# Patient Record
Sex: FEMALE | Race: WHITE | NOT HISPANIC OR LATINO | Employment: OTHER | URBAN - METROPOLITAN AREA
[De-identification: names, ages, dates, MRNs, and addresses within clinical notes are randomized per-mention and may not be internally consistent; named-entity substitution may affect disease eponyms.]

---

## 2017-08-21 ENCOUNTER — OFFICE VISIT (OUTPATIENT)
Dept: FAMILY MEDICINE | Facility: CLINIC | Age: 67
End: 2017-08-21
Payer: MEDICARE

## 2017-08-21 ENCOUNTER — LAB VISIT (OUTPATIENT)
Dept: LAB | Facility: HOSPITAL | Age: 67
End: 2017-08-21
Attending: FAMILY MEDICINE
Payer: MEDICARE

## 2017-08-21 VITALS
WEIGHT: 132.81 LBS | HEART RATE: 84 BPM | HEIGHT: 65 IN | BODY MASS INDEX: 22.13 KG/M2 | DIASTOLIC BLOOD PRESSURE: 66 MMHG | TEMPERATURE: 98 F | SYSTOLIC BLOOD PRESSURE: 102 MMHG | OXYGEN SATURATION: 98 %

## 2017-08-21 DIAGNOSIS — Z85.41 HISTORY OF CERVICAL CANCER: ICD-10-CM

## 2017-08-21 DIAGNOSIS — Z90.12 STATUS POST LEFT MASTECTOMY: ICD-10-CM

## 2017-08-21 DIAGNOSIS — G47.00 INSOMNIA, UNSPECIFIED TYPE: ICD-10-CM

## 2017-08-21 DIAGNOSIS — F32.A DEPRESSION, UNSPECIFIED DEPRESSION TYPE: ICD-10-CM

## 2017-08-21 DIAGNOSIS — Z79.899 LONG-TERM USE OF HIGH-RISK MEDICATION: Primary | ICD-10-CM

## 2017-08-21 DIAGNOSIS — Z79.899 LONG-TERM USE OF HIGH-RISK MEDICATION: ICD-10-CM

## 2017-08-21 DIAGNOSIS — N39.0 URINARY TRACT INFECTION WITHOUT HEMATURIA, SITE UNSPECIFIED: ICD-10-CM

## 2017-08-21 LAB
ALBUMIN SERPL BCP-MCNC: 3.7 G/DL
ALP SERPL-CCNC: 111 U/L
ALT SERPL W/O P-5'-P-CCNC: 14 U/L
ANION GAP SERPL CALC-SCNC: 9 MMOL/L
AST SERPL-CCNC: 18 U/L
BILIRUB SERPL-MCNC: 0.8 MG/DL
BUN SERPL-MCNC: 6 MG/DL
CALCIUM SERPL-MCNC: 9.6 MG/DL
CHLORIDE SERPL-SCNC: 107 MMOL/L
CO2 SERPL-SCNC: 26 MMOL/L
CREAT SERPL-MCNC: 0.8 MG/DL
EST. GFR  (AFRICAN AMERICAN): >60 ML/MIN/1.73 M^2
EST. GFR  (NON AFRICAN AMERICAN): >60 ML/MIN/1.73 M^2
GLUCOSE SERPL-MCNC: 97 MG/DL
POTASSIUM SERPL-SCNC: 4.1 MMOL/L
PROT SERPL-MCNC: 7.3 G/DL
SODIUM SERPL-SCNC: 142 MMOL/L

## 2017-08-21 PROCEDURE — 80053 COMPREHEN METABOLIC PANEL: CPT

## 2017-08-21 PROCEDURE — 36415 COLL VENOUS BLD VENIPUNCTURE: CPT | Mod: PO

## 2017-08-21 PROCEDURE — 1126F AMNT PAIN NOTED NONE PRSNT: CPT | Mod: S$GLB,,, | Performed by: FAMILY MEDICINE

## 2017-08-21 PROCEDURE — 99999 PR PBB SHADOW E&M-NEW PATIENT-LVL III: CPT | Mod: PBBFAC,,, | Performed by: FAMILY MEDICINE

## 2017-08-21 PROCEDURE — 99203 OFFICE O/P NEW LOW 30 MIN: CPT | Mod: S$GLB,,, | Performed by: FAMILY MEDICINE

## 2017-08-21 PROCEDURE — 1159F MED LIST DOCD IN RCRD: CPT | Mod: S$GLB,,, | Performed by: FAMILY MEDICINE

## 2017-08-21 PROCEDURE — 3008F BODY MASS INDEX DOCD: CPT | Mod: S$GLB,,, | Performed by: FAMILY MEDICINE

## 2017-08-21 RX ORDER — NITROFURANTOIN 25; 75 MG/1; MG/1
CAPSULE ORAL
COMMUNITY
Start: 2017-08-11 | End: 2017-11-21

## 2017-08-21 RX ORDER — DOXEPIN HYDROCHLORIDE 100 MG/1
100 CAPSULE ORAL NIGHTLY
Qty: 30 CAPSULE | Refills: 1 | Status: SHIPPED | OUTPATIENT
Start: 2017-08-21 | End: 2017-10-30 | Stop reason: DRUGHIGH

## 2017-08-21 RX ORDER — DULOXETIN HYDROCHLORIDE 30 MG/1
CAPSULE, DELAYED RELEASE ORAL
COMMUNITY
Start: 2017-08-11 | End: 2017-08-21 | Stop reason: SDUPTHER

## 2017-08-21 RX ORDER — DOXEPIN HYDROCHLORIDE 100 MG/1
CAPSULE ORAL
COMMUNITY
Start: 2017-07-10 | End: 2017-08-21 | Stop reason: SDUPTHER

## 2017-08-21 RX ORDER — DULOXETIN HYDROCHLORIDE 60 MG/1
60 CAPSULE, DELAYED RELEASE ORAL
COMMUNITY
Start: 2014-01-20 | End: 2017-08-21 | Stop reason: SDUPTHER

## 2017-08-21 RX ORDER — DULOXETIN HYDROCHLORIDE 30 MG/1
30 CAPSULE, DELAYED RELEASE ORAL DAILY
Qty: 30 CAPSULE | Refills: 1 | Status: SHIPPED | OUTPATIENT
Start: 2017-08-21 | End: 2017-10-30 | Stop reason: SDUPTHER

## 2017-08-21 RX ORDER — DULOXETIN HYDROCHLORIDE 60 MG/1
60 CAPSULE, DELAYED RELEASE ORAL DAILY
Qty: 30 CAPSULE | Refills: 1 | Status: SHIPPED | OUTPATIENT
Start: 2017-08-21 | End: 2017-10-30 | Stop reason: SDUPTHER

## 2017-08-21 NOTE — PROGRESS NOTES
"Subjective:       Patient ID: Jaimee Mccarthy is a 66 y.o. female.    Chief Complaint: Establish Care      HPI Comments:     She presents to establish care.  Recently moved back to the area from [place].    History of cervical cancer many years ago: "They removed part of my cervix and used a cold cone".  Also status post left mastectomy, but patient says was negative for cancer.  She's been followed by an oncologist for many years and has a mammogram ordered.    Patient requesting refill on her mental health medications that would carry her until October when she has an appointment with our psychiatrist.  She has a history of depression and anxiety.  She's been on the same medications for years which she says is working well: Cymbalta 90 mg (60 mg +30 mg) every morning, and doxepin 100 mg daily at bedtime.      She was recently treated for UTI at an emergency room and is still taking her Macrobid.  Symptoms are improving.  However she had a metabolic panel done and was told that it was "abnormal" with no details    She has a family history of cancer: Father with oral cancer; brother with liver cancer; sister with kidney/bladder cancer.        Review of Systems   Constitutional: Negative for activity change, appetite change, fatigue and fever.   HENT: Negative for sore throat.    Respiratory: Negative for cough and shortness of breath.    Cardiovascular: Negative for chest pain and leg swelling.   Gastrointestinal: Negative for abdominal pain, diarrhea and nausea.   Genitourinary: Negative for dysuria and hematuria.   Musculoskeletal: Negative for back pain.   Neurological: Negative for dizziness and headaches.   Hematological: Negative for adenopathy.   Psychiatric/Behavioral: Negative for confusion.       Objective:      Physical Exam   Constitutional: She is oriented to person, place, and time. She appears well-developed and well-nourished. No distress.   HENT:   Head: Normocephalic.   Mouth/Throat: No " oropharyngeal exudate.   Eyes: Conjunctivae are normal. Right eye exhibits no discharge. Left eye exhibits no discharge. No scleral icterus.   Neck: Normal range of motion. Neck supple. No JVD present. No thyromegaly present.   Cardiovascular: Normal rate, regular rhythm, normal heart sounds and intact distal pulses.  Exam reveals no gallop and no friction rub.    No murmur heard.  Pulmonary/Chest: Effort normal and breath sounds normal. No respiratory distress. She has no wheezes. She has no rales.   Musculoskeletal: Normal range of motion. She exhibits no edema.   Lymphadenopathy:     She has no cervical adenopathy.   Neurological: She is alert and oriented to person, place, and time.   Skin: Skin is warm and dry. No rash noted. She is not diaphoretic.   Psychiatric: She has a normal mood and affect. Her behavior is normal. Judgment and thought content normal.   Nursing note and vitals reviewed.      Assessment:       1. Long-term use of high-risk medication    2. Depression, unspecified depression type    3. Insomnia, unspecified type    4. Urinary tract infection without hematuria, site unspecified    5. Status post left mastectomy    6. History of cervical cancer        Plan:   Long-term use of high-risk medication  Comments:  We'll repeat her metabolic panel today.  Follow-up for physical in November  Orders:  -     Comprehensive metabolic panel; Future; Expected date: 08/21/2017    Depression, unspecified depression type  Comments:  Long-standing.  Well-controlled with Cymbalta, but taking 90 mg a day.  Patient wants to refill this now and follow up with psychiatrist next month    Insomnia, unspecified type  Comments:  Has done well with doxepin at bedtime for years.  Refill ×2 months given until psychiatrist resumes prescribing    Urinary tract infection without hematuria, site unspecified  Comments:  Symptoms improving on Macrobid    Status post left mastectomy  Comments:  Although patient says cancer  evaluation was all negative    History of cervical cancer    Other orders  -     doxepin (SINEQUAN) 100 MG capsule; Take 1 capsule (100 mg total) by mouth every evening.  Dispense: 30 capsule; Refill: 1  -     duloxetine (CYMBALTA) 30 MG capsule; Take 1 capsule (30 mg total) by mouth once daily.  Dispense: 30 capsule; Refill: 1  -     duloxetine (CYMBALTA) 60 MG capsule; Take 1 capsule (60 mg total) by mouth once daily.  Dispense: 30 capsule; Refill: 1

## 2017-10-26 NOTE — PROGRESS NOTES
"Outpatient Psychiatry Initial Visit (MD/NP)    10/30/2017    Jaimee Mccarthy, a 66 y.o. female, presenting for initial evaluation visit. Met with patient.    Reason for Encounter: self-referral. Patient complains of depression, anxiety.     History of Present Illness: This 66 y/o F presents to [facility], previously a patient of Dr. Degroot(sp?) who she last saw about 3-4 months ago, but who she says disclosed protected health information to a family member so she is seeking new psychiatrist. Reports current medication regimen includes Cymbalta 90 mg (60 mg +30 mg) every morning, & doxepin 100 mg daily at bedtime. Says this regimen is working well. On 8.9.17 - had ER visit by [facility] after EMS called. EMS noted paranoia - cited that she'd ripped shingles off roof, told police she thought someone had been in house several days before after calling them due to things moved in her home, heard something on her roof and when she checked found vents weren't sealed, "strange wiring", called police. ED physician exam documented that she didn't seem to be hallucinating, delusional or thought-disordered and she was discharged.     No SI/HI  No AVH,       PsychHx: as above; "Depression with anxiety" - office-based outpatient treatment, inpatient treatment - x2-3 (for changing sleep medication in context of highly distressing insomnia);   Series of previous antidepressants - reports having when started Cymbalta 90 mg daily, helped moods far more than prior treatments ("when I started it I thought I was reborn").   Doxepin 100 mg qhs. Partially helpful for Insomnia;   Two prior psychiatrist, previously Dr. Degroot(sp?) Treating her with same medications. Another at [facility], threatened to turn him in for medicaid fraud  8.9.17 - ER visit by [facility] after EMS called. EMS noted paranoia - cited that she'd ripped shingles off roof, told police she thought someone had been in house several days before after calling " "them due to things moved in her home, heard something on her roof and when she checked found vents weren't sealed, "strange wiring", called police. No found to be psychotic on ED assessment. No other PEC's though had visit from a state agency in '16 due to concern by an unknown neighbor for "delusional", "acting out at her apartment" (report didn't describe her specific acting out behavior).  had  come out for a wellness check - "someone in the complex said I wasn't taking care of myself", said my "apartment was a wreck".  found her apartment in order, didn't take her in for care. Denies conflicts with others or reasons someone might make a bad shara report. Has been out of that complex x 5-6 months.   One remote episode of passive suicidality in  when went in for lumpectomy - ended up with mastectomy, + lymph node dissection then learned pathology showed no cancer.     FamHx: sister with serious mental illness, son chinmay    MedHx:   Rib Removed  Breast CA - mastectomy - didn't have CA.   Cervical CA (s/p cone) - History of cervical cancer many years ago:   Basal and squamous cell CA (nares)  Hyperlipidemia   Recurrent urinary tract infections    SocialHx: born in [facility], raised in . 3 sibs growing up; much older (18 and 20 years).   lives in subsidized disability housing; sister institutionalized. Lived with patient's parents as an adult. On/off meds. Cycle of hospitalizations. Functioned ok on meds, not off. Doesn't know diagnoses. Normal socially & academically.   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    x 1 child. Was single mother. Lives in [place].  x 2 (,  36 years). Supportive people "all " - both parents and siblings . Trying to get moved in to house. Trying to volunteer with the food bank. Wants to join [employer].   Social security - recognized mental health disability.  "   Takes medication every day.       Review Of Systems:     GENERAL:  No weight gain or loss  SKIN:  No rashes or lacerations  HEAD:  No headaches  EYES:  No exophthalmos, jaundice or blindness  EARS:  No dizziness, tinnitus or hearing loss  NOSE:  No changes in smell  MOUTH & THROAT:  No dyskinetic movements or obvious goiter  CHEST:  No shortness of breath, hyperventilation or cough  CARDIOVASCULAR:  No tachycardia or chest pain  ABDOMEN:  No nausea, vomiting, pain, constipation or diarrhea  URINARY:  No frequency, dysuria or sexual dysfunction  ENDOCRINE:  No polydipsia, polyuria  MUSCULOSKELETAL:  No pain or stiffness of the joints  NEUROLOGIC:  No weakness, sensory changes, seizures, confusion, memory loss, tremor or other abnormal movements      Current Evaluation:     Nutritional Screening: Considering the patient's height and weight, medications, medical history and preferences, should a referral be made to the dietitian? no    Constitutional  Vitals:  Most recent vital signs, dated less than 90 days prior to this appointment, were not reviewed.  There were no vitals filed for this visit.     General:  unremarkable, age appropriate     Musculoskeletal  Muscle Strength/Tone:  no tremor, no tic   Gait & Station:  non-ataxic     Psychiatric  Appearance: casually dressed & groomed;   Behavior: calm,   Cooperation: cooperative with assessment  Speech: normal rate, volume, tone  Thought Process: linear, goal-directed  Thought Content: No suicidal or homicidal ideation; no delusions  Affect: mildly anxious  Mood: mildly anxious  Perceptions: No auditory or visual hallucinations  Level of Consciousness: alert throughout interview  Insight: fair  Cognition: Oriented to person, place, time, & situation  Memory: no apparent deficits to general clinical interview; not formally assessed  Attention/Concentration: no apparent deficits to general clinical interview; not formally assessed  Fund of Knowledge: average by  vocabulary/education    Laboratory Data  No visits with results within 1 Month(s) from this visit.   Latest known visit with results is:   Lab Visit on 08/21/2017   Component Date Value Ref Range Status    Sodium 08/21/2017 142  136 - 145 mmol/L Final    Potassium 08/21/2017 4.1  3.5 - 5.1 mmol/L Final    Chloride 08/21/2017 107  95 - 110 mmol/L Final    CO2 08/21/2017 26  23 - 29 mmol/L Final    Glucose 08/21/2017 97  70 - 110 mg/dL Final    BUN, Bld 08/21/2017 6* 8 - 23 mg/dL Final    Creatinine 08/21/2017 0.8  0.5 - 1.4 mg/dL Final    Calcium 08/21/2017 9.6  8.7 - 10.5 mg/dL Final    Total Protein 08/21/2017 7.3  6.0 - 8.4 g/dL Final    Albumin 08/21/2017 3.7  3.5 - 5.2 g/dL Final    Total Bilirubin 08/21/2017 0.8  0.1 - 1.0 mg/dL Final    Alkaline Phosphatase 08/21/2017 111  55 - 135 U/L Final    AST 08/21/2017 18  10 - 40 U/L Final    ALT 08/21/2017 14  10 - 44 U/L Final    Anion Gap 08/21/2017 9  8 - 16 mmol/L Final    eGFR if African American 08/21/2017 >60.0  >60 mL/min/1.73 m^2 Final    eGFR if non African American 08/21/2017 >60.0  >60 mL/min/1.73 m^2 Final     Medications  Outpatient Encounter Prescriptions as of 10/30/2017   Medication Sig Dispense Refill    doxepin (SINEQUAN) 100 MG capsule Take 1 capsule (100 mg total) by mouth every evening. 30 capsule 1    duloxetine (CYMBALTA) 30 MG capsule Take 1 capsule (30 mg total) by mouth once daily. 30 capsule 1    duloxetine (CYMBALTA) 60 MG capsule Take 1 capsule (60 mg total) by mouth once daily. 30 capsule 1    nitrofurantoin, macrocrystal-monohydrate, (MACROBID) 100 MG capsule        No facility-administered encounter medications on file as of 10/30/2017.      Assessment - Diagnosis - Goals:     Impression: 68 y/o F with hx of problems with anxiety, depression for years, ongoing treatment with benefit and well-tolerated regimen. Has had a P[facility] for concerns for paranoia and near-PEC for alleged neglect but neither well substantiated.      Treatment Goals:  Specify outcomes written in observable, behavioral terms:   Control depression and anxiety symptoms by self-report    Treatment Plan/Recommendations:   · Continue duloxetine 90 mg daily, doxepin 50 to 100 mg qhs prn sleep. Consider alternatives should TCA side effects become evident.  · Discussed risks, benefits, and alternatives to treatment plan documented above with patient. I answered all patient questions related to this plan and patient expressed understanding and agreement.     Return to Clinic: 2 months    Counseling time: 10 minutes  Total time: 50 minutes    VIPIN Chavez MD

## 2017-10-30 ENCOUNTER — OFFICE VISIT (OUTPATIENT)
Dept: PSYCHIATRY | Facility: CLINIC | Age: 67
End: 2017-10-30
Payer: MEDICARE

## 2017-10-30 DIAGNOSIS — F41.1 GAD (GENERALIZED ANXIETY DISORDER): Primary | ICD-10-CM

## 2017-10-30 DIAGNOSIS — G47.00 INSOMNIA, UNSPECIFIED TYPE: ICD-10-CM

## 2017-10-30 PROCEDURE — 90792 PSYCH DIAG EVAL W/MED SRVCS: CPT | Mod: S$GLB,,, | Performed by: PSYCHIATRY & NEUROLOGY

## 2017-10-30 RX ORDER — DOXEPIN HYDROCHLORIDE 50 MG/1
CAPSULE ORAL
Qty: 60 CAPSULE | Refills: 2 | Status: SHIPPED | OUTPATIENT
Start: 2017-10-30 | End: 2018-01-30 | Stop reason: ALTCHOICE

## 2017-10-30 RX ORDER — DULOXETIN HYDROCHLORIDE 60 MG/1
60 CAPSULE, DELAYED RELEASE ORAL DAILY
Qty: 30 CAPSULE | Refills: 2 | Status: SHIPPED | OUTPATIENT
Start: 2017-10-30 | End: 2018-01-30 | Stop reason: SDUPTHER

## 2017-10-30 RX ORDER — DULOXETIN HYDROCHLORIDE 30 MG/1
30 CAPSULE, DELAYED RELEASE ORAL DAILY
Qty: 30 CAPSULE | Refills: 2 | Status: SHIPPED | OUTPATIENT
Start: 2017-10-30 | End: 2018-01-30 | Stop reason: SDUPTHER

## 2017-11-21 ENCOUNTER — OFFICE VISIT (OUTPATIENT)
Dept: FAMILY MEDICINE | Facility: CLINIC | Age: 67
End: 2017-11-21
Payer: MEDICARE

## 2017-11-21 VITALS
TEMPERATURE: 98 F | SYSTOLIC BLOOD PRESSURE: 110 MMHG | DIASTOLIC BLOOD PRESSURE: 70 MMHG | BODY MASS INDEX: 22.41 KG/M2 | HEART RATE: 97 BPM | OXYGEN SATURATION: 97 % | WEIGHT: 134.5 LBS | HEIGHT: 65 IN

## 2017-11-21 DIAGNOSIS — Z23 NEED FOR VACCINATION: ICD-10-CM

## 2017-11-21 DIAGNOSIS — J06.9 UPPER RESPIRATORY TRACT INFECTION, UNSPECIFIED TYPE: ICD-10-CM

## 2017-11-21 DIAGNOSIS — E87.6 HYPOKALEMIA: ICD-10-CM

## 2017-11-21 DIAGNOSIS — Z78.0 POSTMENOPAUSAL: ICD-10-CM

## 2017-11-21 DIAGNOSIS — Z12.39 SCREENING FOR BREAST CANCER: ICD-10-CM

## 2017-11-21 DIAGNOSIS — R30.0 DYSURIA: ICD-10-CM

## 2017-11-21 DIAGNOSIS — Z00.00 ANNUAL PHYSICAL EXAM: Primary | ICD-10-CM

## 2017-11-21 DIAGNOSIS — Z13.820 SCREENING FOR OSTEOPOROSIS: ICD-10-CM

## 2017-11-21 DIAGNOSIS — F32.A DEPRESSION, UNSPECIFIED DEPRESSION TYPE: ICD-10-CM

## 2017-11-21 LAB
BILIRUB SERPL-MCNC: NEGATIVE MG/DL
BLOOD URINE, POC: NORMAL
COLOR, POC UA: YELLOW
GLUCOSE UR QL STRIP: NORMAL
KETONES UR QL STRIP: NEGATIVE
LEUKOCYTE ESTERASE URINE, POC: NORMAL
NITRITE, POC UA: NEGATIVE
PH, POC UA: 5
PROTEIN, POC: NEGATIVE
SPECIFIC GRAVITY, POC UA: 1.02
UROBILINOGEN, POC UA: NORMAL

## 2017-11-21 PROCEDURE — 90670 PCV13 VACCINE IM: CPT | Mod: S$GLB,,, | Performed by: FAMILY MEDICINE

## 2017-11-21 PROCEDURE — G0008 ADMIN INFLUENZA VIRUS VAC: HCPCS | Mod: S$GLB,,, | Performed by: FAMILY MEDICINE

## 2017-11-21 PROCEDURE — 99397 PER PM REEVAL EST PAT 65+ YR: CPT | Mod: 25,S$GLB,, | Performed by: FAMILY MEDICINE

## 2017-11-21 PROCEDURE — 87086 URINE CULTURE/COLONY COUNT: CPT

## 2017-11-21 PROCEDURE — 99999 PR PBB SHADOW E&M-EST. PATIENT-LVL V: CPT | Mod: PBBFAC,,, | Performed by: FAMILY MEDICINE

## 2017-11-21 PROCEDURE — 81002 URINALYSIS NONAUTO W/O SCOPE: CPT | Mod: S$GLB,,, | Performed by: FAMILY MEDICINE

## 2017-11-21 PROCEDURE — G0009 ADMIN PNEUMOCOCCAL VACCINE: HCPCS | Mod: S$GLB,,, | Performed by: FAMILY MEDICINE

## 2017-11-21 PROCEDURE — 90662 IIV NO PRSV INCREASED AG IM: CPT | Mod: S$GLB,,, | Performed by: FAMILY MEDICINE

## 2017-11-21 RX ORDER — NITROFURANTOIN 25; 75 MG/1; MG/1
100 CAPSULE ORAL 2 TIMES DAILY
Qty: 14 CAPSULE | Refills: 0 | Status: ON HOLD | OUTPATIENT
Start: 2017-11-21 | End: 2018-10-01 | Stop reason: HOSPADM

## 2017-11-21 RX ORDER — NITROFURANTOIN 25; 75 MG/1; MG/1
100 CAPSULE ORAL EVERY 12 HOURS
Status: DISCONTINUED | OUTPATIENT
Start: 2017-11-21 | End: 2017-11-21

## 2017-11-21 RX ORDER — METHYLPREDNISOLONE 4 MG/1
TABLET ORAL
Qty: 1 PACKAGE | Refills: 0 | Status: SHIPPED | OUTPATIENT
Start: 2017-11-21 | End: 2017-12-12

## 2017-11-21 NOTE — PROGRESS NOTES
Subjective:       Patient ID: Jaimee Mccarthy is a 67 y.o. female.    Chief Complaint: Back Pain; Nocturia; and Headache      HPI Comments:       Current Outpatient Prescriptions:     doxepin (SINEQUAN) 50 MG capsule, Take 1 to 2 tablets at bedtime as needed for sleep, Disp: 60 capsule, Rfl: 2    DULoxetine (CYMBALTA) 30 MG capsule, Take 1 capsule (30 mg total) by mouth once daily., Disp: 30 capsule, Rfl: 2    DULoxetine (CYMBALTA) 60 MG capsule, Take 1 capsule (60 mg total) by mouth once daily., Disp: 30 capsule, Rfl: 2    methylPREDNISolone (MEDROL DOSEPACK) 4 mg tablet, use as directed, Disp: 1 Package, Rfl: 0    nitrofurantoin, macrocrystal-monohydrate, (MACROBID) 100 MG capsule, Take 1 capsule (100 mg total) by mouth 2 (two) times daily., Disp: 14 capsule, Rfl: 0  No current facility-administered medications for this visit.       She presents with several concerns: 3 week history of sore throat and bilateral ear pain, cough of clear sputum and clear sinus drip.  Taking only Tylenol.  No history of seasonal ALLERGIES.  Nonsmoker.    Also complains of dysuria and urinary frequency with a cloudiness in her urine for several days; chronic left lower back pain but no flank pain.  Says that she's been talking to psychiatrist about stopping her doxepin because it seems to set her up for urinary tract infection. Attempt to switch to trazadone was not well tolerated: hung over.    Also says that she's had long-term problem with hypokalemia and is currently having leg cramps.  She doesn't know why she gets hypokalemic.  She does not take fluid pills, and her diet is normal.    Her mammogram, I'll previously been normal.  Never had a DEXA scan or colonoscopy.  Wants flu shot today vaccines.      Review of Systems   Constitutional: Negative for activity change, appetite change and fever.   HENT: Positive for congestion, ear pain, postnasal drip, rhinorrhea and sinus pressure. Negative for sore throat.    Respiratory:  "Positive for cough. Negative for shortness of breath and wheezing.    Cardiovascular: Negative for chest pain.   Gastrointestinal: Negative for abdominal pain, diarrhea and nausea.   Genitourinary: Positive for dysuria, frequency and urgency. Negative for difficulty urinating, flank pain and hematuria.   Musculoskeletal: Negative for arthralgias and myalgias.   Allergic/Immunologic: Negative for environmental allergies.   Neurological: Negative for dizziness and headaches.   Psychiatric/Behavioral: Negative for dysphoric mood. The patient is not nervous/anxious.        Objective:      Vitals:    11/21/17 0957   BP: 110/70   Pulse: 97   Temp: 97.6 °F (36.4 °C)   TempSrc: Tympanic   SpO2: 97%   Weight: 61 kg (134 lb 7.7 oz)   Height: 5' 4.5" (1.638 m)   PainSc: 0-No pain     Physical Exam   Constitutional: She is oriented to person, place, and time. She appears well-developed and well-nourished.  Non-toxic appearance. She does not appear ill. No distress.   HENT:   Head: Normocephalic.   Right Ear: Tympanic membrane, external ear and ear canal normal.   Left Ear: Tympanic membrane, external ear and ear canal normal.   Nose: Mucosal edema and rhinorrhea present.   Mouth/Throat: Mucous membranes are normal. Posterior oropharyngeal edema present. No oropharyngeal exudate or posterior oropharyngeal erythema.   Neck: Neck supple. No thyromegaly present.   Cardiovascular: Normal rate, regular rhythm and normal heart sounds.    No murmur heard.  Pulmonary/Chest: Effort normal and breath sounds normal. She has no wheezes. She has no rales.   Abdominal: Soft. She exhibits no distension. There is no hepatosplenomegaly. There is tenderness in the suprapubic area. There is no CVA tenderness.   Musculoskeletal: She exhibits no edema.   Lymphadenopathy:     She has no cervical adenopathy.   Neurological: She is alert and oriented to person, place, and time.   Skin: Skin is warm and dry. She is not diaphoretic.   Psychiatric: She " has a normal mood and affect. Her behavior is normal. Judgment and thought content normal.   Nursing note and vitals reviewed.      Assessment:       1. Annual physical exam    2. Hypokalemia    3. Need for vaccination    4. Dysuria    5. Screening for breast cancer    6. Screening for osteoporosis    7. Postmenopausal    8. Upper respiratory tract infection, unspecified type    9. Depression, unspecified depression type        Plan:   Annual physical exam  -     Lipid panel; Future; Expected date: 11/21/2017  -     Hepatitis C antibody; Future; Expected date: 12/05/2017  -     Ambulatory consult to Gastroenterology  -     Mammo Digital Screening Bilat with CAD; Future; Expected date: 11/21/2017  -     DXA Bone Density Spine And Hip; Future; Expected date: 11/21/2017    Hypokalemia  -     Comprehensive metabolic panel; Future; Expected date: 11/21/2017    Need for vaccination  -     Pneumococcal Conjugate Vaccine (13 Valent) (IM)    Dysuria  -     POCT urine dipstick without microscope  -     Urine culture; Future; Expected date: 11/21/2017    Screening for breast cancer  -     Mammo Digital Screening Bilat with CAD; Future; Expected date: 11/21/2017    Screening for osteoporosis  -     DXA Bone Density Spine And Hip; Future; Expected date: 11/21/2017    Postmenopausal  -     DXA Bone Density Spine And Hip; Future; Expected date: 11/21/2017    Upper respiratory tract infection, unspecified type    Depression, unspecified depression type  Comments:  followed by mental health. Needs to get off of Doxepin. Didn't like trazadone    Other orders  -     Cancel: Tdap Vaccine  -     Influenza - High Dose (65+) (PF) (IM)  -     Discontinue: nitrofurantoin (macrocrystal-monohydrate) 100 MG capsule 100 mg; Take 1 capsule (100 mg total) by mouth every 12 (twelve) hours.  -     methylPREDNISolone (MEDROL DOSEPACK) 4 mg tablet; use as directed  Dispense: 1 Package; Refill: 0  -     nitrofurantoin, macrocrystal-monohydrate,  (MACROBID) 100 MG capsule; Take 1 capsule (100 mg total) by mouth 2 (two) times daily.  Dispense: 14 capsule; Refill: 0

## 2017-11-22 ENCOUNTER — TELEPHONE (OUTPATIENT)
Dept: FAMILY MEDICINE | Facility: CLINIC | Age: 67
End: 2017-11-22

## 2017-11-22 LAB
BACTERIA UR CULT: NORMAL
BACTERIA UR CULT: NORMAL

## 2017-11-22 NOTE — TELEPHONE ENCOUNTER
----- Message from Julia Beltrán sent at 11/22/2017  8:54 AM CST -----  Contact: pt  States she's returning a call. Please call pt at [phone]. Thank you

## 2017-11-27 ENCOUNTER — LAB VISIT (OUTPATIENT)
Dept: LAB | Facility: HOSPITAL | Age: 67
End: 2017-11-27
Attending: FAMILY MEDICINE
Payer: MEDICARE

## 2017-11-27 DIAGNOSIS — E87.6 HYPOKALEMIA: ICD-10-CM

## 2017-11-27 DIAGNOSIS — Z00.00 ANNUAL PHYSICAL EXAM: ICD-10-CM

## 2017-11-27 LAB
ALBUMIN SERPL BCP-MCNC: 3.9 G/DL
ALP SERPL-CCNC: 114 U/L
ALT SERPL W/O P-5'-P-CCNC: 13 U/L
ANION GAP SERPL CALC-SCNC: 8 MMOL/L
AST SERPL-CCNC: 17 U/L
BILIRUB SERPL-MCNC: 1.1 MG/DL
BUN SERPL-MCNC: 11 MG/DL
CALCIUM SERPL-MCNC: 9.9 MG/DL
CHLORIDE SERPL-SCNC: 107 MMOL/L
CHOLEST SERPL-MCNC: 230 MG/DL
CHOLEST/HDLC SERPL: 5.6 {RATIO}
CO2 SERPL-SCNC: 27 MMOL/L
CREAT SERPL-MCNC: 0.8 MG/DL
EST. GFR  (AFRICAN AMERICAN): >60 ML/MIN/1.73 M^2
EST. GFR  (NON AFRICAN AMERICAN): >60 ML/MIN/1.73 M^2
GLUCOSE SERPL-MCNC: 105 MG/DL
HDLC SERPL-MCNC: 41 MG/DL
HDLC SERPL: 17.8 %
LDLC SERPL CALC-MCNC: 157 MG/DL
NONHDLC SERPL-MCNC: 189 MG/DL
POTASSIUM SERPL-SCNC: 4.5 MMOL/L
PROT SERPL-MCNC: 7.4 G/DL
SODIUM SERPL-SCNC: 142 MMOL/L
TRIGL SERPL-MCNC: 160 MG/DL

## 2017-11-27 PROCEDURE — 80061 LIPID PANEL: CPT

## 2017-11-27 PROCEDURE — 80053 COMPREHEN METABOLIC PANEL: CPT

## 2017-11-27 PROCEDURE — 36415 COLL VENOUS BLD VENIPUNCTURE: CPT | Mod: PO

## 2017-11-27 PROCEDURE — 86803 HEPATITIS C AB TEST: CPT

## 2017-11-28 LAB — HCV AB SERPL QL IA: NEGATIVE

## 2017-11-30 ENCOUNTER — TELEPHONE (OUTPATIENT)
Dept: FAMILY MEDICINE | Facility: CLINIC | Age: 67
End: 2017-11-30

## 2017-11-30 NOTE — TELEPHONE ENCOUNTER
----- Message from Ayanna Mccarty sent at 11/30/2017  8:15 AM CST -----  Contact: [phone]   Returning call, please call back at [phone].  Thx-AH

## 2017-12-05 ENCOUNTER — TELEPHONE (OUTPATIENT)
Dept: FAMILY MEDICINE | Facility: CLINIC | Age: 67
End: 2017-12-05

## 2017-12-05 NOTE — TELEPHONE ENCOUNTER
Spoke with patient and informed her that I had not been trying to get in contact with her. Patient states that she has had several missed calls, explained that it might be Dr. Gibbs and I will have him call her if that is the case. Patient verbalized understanding.

## 2017-12-05 NOTE — TELEPHONE ENCOUNTER
----- Message from Ean Roberto sent at 12/5/2017  7:49 AM CST -----  Patient returned your call.  Call her at [phone].                                 [email or web address]

## 2018-01-12 ENCOUNTER — HOSPITAL ENCOUNTER (OUTPATIENT)
Dept: RADIOLOGY | Facility: HOSPITAL | Age: 68
Discharge: HOME | End: 2018-01-12
Attending: FAMILY MEDICINE
Payer: MEDICARE

## 2018-01-12 DIAGNOSIS — Z00.00 ANNUAL PHYSICAL EXAM: ICD-10-CM

## 2018-01-12 DIAGNOSIS — Z12.39 SCREENING FOR BREAST CANCER: ICD-10-CM

## 2018-01-12 PROCEDURE — 77067 SCR MAMMO BI INCL CAD: CPT | Mod: 26,,, | Performed by: RADIOLOGY

## 2018-01-12 PROCEDURE — 77063 BREAST TOMOSYNTHESIS BI: CPT | Mod: 26,,, | Performed by: RADIOLOGY

## 2018-01-12 PROCEDURE — 77067 SCR MAMMO BI INCL CAD: CPT | Mod: TC

## 2018-01-30 ENCOUNTER — OFFICE VISIT (OUTPATIENT)
Dept: PSYCHIATRY | Facility: CLINIC | Age: 68
End: 2018-01-30
Payer: MEDICARE

## 2018-01-30 DIAGNOSIS — F22 DELUSIONAL DISORDER: Primary | ICD-10-CM

## 2018-01-30 PROCEDURE — 99213 OFFICE O/P EST LOW 20 MIN: CPT | Mod: S$GLB,,, | Performed by: PSYCHIATRY & NEUROLOGY

## 2018-01-30 RX ORDER — QUETIAPINE FUMARATE 100 MG/1
TABLET, FILM COATED ORAL
Qty: 45 TABLET | Refills: 2 | Status: SHIPPED | OUTPATIENT
Start: 2018-01-30 | End: 2018-02-12 | Stop reason: SINTOL

## 2018-01-30 RX ORDER — DULOXETIN HYDROCHLORIDE 60 MG/1
60 CAPSULE, DELAYED RELEASE ORAL DAILY
Qty: 30 CAPSULE | Refills: 2 | Status: SHIPPED | OUTPATIENT
Start: 2018-01-30 | End: 2018-03-07 | Stop reason: SDUPTHER

## 2018-01-30 RX ORDER — DULOXETIN HYDROCHLORIDE 30 MG/1
30 CAPSULE, DELAYED RELEASE ORAL DAILY
Qty: 30 CAPSULE | Refills: 1 | Status: SHIPPED | OUTPATIENT
Start: 2018-01-30 | End: 2018-03-07 | Stop reason: SDUPTHER
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
## 2018-02-12 ENCOUNTER — TELEPHONE (OUTPATIENT)
Dept: PSYCHIATRY | Facility: CLINIC | Age: 68
End: 2018-02-12

## 2018-02-12 RX ORDER — OLANZAPINE 5 MG/1
5 TABLET ORAL NIGHTLY
Qty: 30 TABLET | Refills: 1 | Status: SHIPPED | OUTPATIENT
Start: 2018-02-12 | End: 2018-03-07 | Stop reason: SINTOL

## 2018-02-12 NOTE — TELEPHONE ENCOUNTER
Pt is having a bad reaction to the seroquel - it is causing nausea, seems to bring on panic attack like symptoms, and is not helping her sleep at all.  Pt is wondering if you can prescribe an alternate.

## 2018-03-02 RX ORDER — DOXEPIN HYDROCHLORIDE 50 MG/1
CAPSULE ORAL
Qty: 60 CAPSULE | Refills: 0 | OUTPATIENT
Start: 2018-03-02

## 2018-03-02 RX ORDER — DULOXETIN HYDROCHLORIDE 30 MG/1
CAPSULE, DELAYED RELEASE ORAL
Qty: 30 CAPSULE | Refills: 0 | Status: SHIPPED | OUTPATIENT
Start: 2018-03-02 | End: 2018-03-07 | Stop reason: SDUPTHER

## 2018-03-07 ENCOUNTER — OFFICE VISIT (OUTPATIENT)
Dept: PSYCHIATRY | Facility: CLINIC | Age: 68
End: 2018-03-07
Payer: MEDICARE

## 2018-03-07 DIAGNOSIS — G47.00 INSOMNIA, UNSPECIFIED TYPE: Primary | ICD-10-CM

## 2018-03-07 PROCEDURE — 99213 OFFICE O/P EST LOW 20 MIN: CPT | Mod: S$GLB,,, | Performed by: PSYCHIATRY & NEUROLOGY

## 2018-03-07 RX ORDER — TRAZODONE HYDROCHLORIDE 150 MG/1
TABLET ORAL
Qty: 30 TABLET | Refills: 3 | Status: SHIPPED | OUTPATIENT
Start: 2018-03-07 | End: 2018-07-11 | Stop reason: SDUPTHER

## 2018-03-07 RX ORDER — DULOXETIN HYDROCHLORIDE 60 MG/1
60 CAPSULE, DELAYED RELEASE ORAL DAILY
Qty: 30 CAPSULE | Refills: 2 | Status: SHIPPED | OUTPATIENT
Start: 2018-03-07 | End: 2018-07-18 | Stop reason: SDUPTHER

## 2018-03-07 RX ORDER — DULOXETIN HYDROCHLORIDE 30 MG/1
30 CAPSULE, DELAYED RELEASE ORAL DAILY
Qty: 30 CAPSULE | Refills: 2 | Status: SHIPPED | OUTPATIENT
Start: 2018-03-07 | End: 2018-07-18 | Stop reason: SDUPTHER
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
## 2018-07-12 RX ORDER — TRAZODONE HYDROCHLORIDE 150 MG/1
TABLET ORAL
Qty: 30 TABLET | Refills: 0 | Status: SHIPPED | OUTPATIENT
Start: 2018-07-12 | End: 2018-08-01 | Stop reason: SDUPTHER

## 2018-07-18 RX ORDER — DULOXETIN HYDROCHLORIDE 60 MG/1
60 CAPSULE, DELAYED RELEASE ORAL DAILY
Qty: 30 CAPSULE | Refills: 0 | Status: SHIPPED | OUTPATIENT
Start: 2018-07-18 | End: 2018-08-01 | Stop reason: SDUPTHER

## 2018-07-18 RX ORDER — DULOXETIN HYDROCHLORIDE 30 MG/1
30 CAPSULE, DELAYED RELEASE ORAL DAILY
Qty: 30 CAPSULE | Refills: 0 | Status: SHIPPED | OUTPATIENT
Start: 2018-07-18 | End: 2018-08-01 | Stop reason: SDUPTHER

## 2018-07-31 NOTE — PROGRESS NOTES
"Outpatient Psychiatry Follow-up Visit (MD/NP)    8/1/2018    Jaimee Mccarthy, a 67 y.o. female, presenting for follow-up visit. Met with patient.    Reason for Encounter: self-referral. Patient complains of depression, anxiety.     Interval Hx: Patient seen and interviewed for follow-up. Reports generally feeling and functioning well. Has been active, participating in gardening, enjoying it. In good spirits. No new health problems. Some financial stress. Eating healthy. Adherent to duloxetine daily, trazodone most days. Denies side effects. Lost phone since last visit.    background: This 66 y/o F presents to [facility], previously a patient of Dr. Degroot(sp?) who she last saw about 3-4 months ago, but who she says disclosed protected health information to a family member so she is seeking new psychiatrist. Reports current medication regimen includes Cymbalta 90 mg (60 mg +30 mg) every morning, & doxepin 100 mg daily at bedtime. Says this regimen is working well. On 8.9.17 - had ER visit by [facility] after EMS called. EMS noted paranoia - cited that she'd ripped shingles off roof, told police she thought someone had been in house several days before after calling them due to things moved in her home, heard something on her roof & when she checked found vents weren't sealed, "strange wiring", called police. ED physician exam documented that she didn't seem to be hallucinating, delusional or thought-disordered and she was discharged. No SI/HI. No AVH. PsychHx: as above; "Depression with anxiety" - office-based outpt treatment, inpatient treatment - x2-3 (for changing sleep medication in context of highly distressing insomnia); Series of previous antidepressants - reports having when started Cymbalta 90 mg daily, helped moods far more than prior treatments ("when I started it I thought I was reborn"). Doxepin 100 mg qhs. Partially helpful for Insomnia; Two prior psychiatrist, previously Dr. Degroot(sp?) Treating her " "with same medications. Another at [facility], threatened to turn him in for medicaid fraud. 8.9.17 - ER visit by [facility] after EMS called. EMS noted paranoia - cited that she'd ripped shingles off roof, told police she thought someone had been in house several days before after calling them due to things moved in her home, heard something on her roof & when she checked found vents weren't sealed, "strange wiring", called police. No found to be psychotic on ED assessment. No other PEC's though had visit from a state agency in '16 due to concern by an unknown neighbor for "delusional", "acting out at her apartment" (report didn't describe her specific acting out behavior).  had  come out for a wellness check - "someone in the complex said I wasn't taking care of myself", said my "apartment was a wreck".  found her apartment in order, didn't take her in for care. Denies conflicts with others or reasons someone might make a bad shara report. Has been out of that complex x 5-6 months. One remote episode of passive suicidality in '13 when went in for lumpectomy - ended up with mastectomy, + lymph node dissection then learned pathology showed no cancer. FamHx: sister with serious mental illness, son dyslexia. MedHx: Rib Removed. Breast CA dx for which she had mastectomy - reports pathology later indicated she didn't have CA. Cervical CA (s/p cone) - History of cervical cancer many years ago: Basal and squamous cell CA (nares). Hyperlipidemia. Recurrent urinary tract infections. SocialHx: born in [facility], raised in . 3 sibs growing up; much older (18 and 20 years). lives in subsidized disability housing; sister institutionalized. Lived with patient's parents as an adult. On/off meds. Cycle of hospitalizations. Functioned ok on meds, not off. Doesn't know diagnoses. Normal socially & academically. 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 Moved to " "[place] to be with son.  x 1 child. Was single mother. Lives in [place].  x 2 (,  36 years). Supportive people "all " - both parents and siblings . Trying to get moved in to house. Trying to volunteer with the food bank. Wants to join [employer].   Social security - recognized mental health disability.    Takes medication every day.     Review Of Systems:     GENERAL:  No weight gain or loss  SKIN:  No rashes or lacerations  HEAD:  No headaches  CHEST:  No shortness of breath, hyperventilation or cough  CARDIOVASCULAR:  No tachycardia or chest pain  ABDOMEN:  No nausea, vomiting, pain, constipation or diarrhea  URINARY:  No frequency, dysuria or sexual dysfunction  ENDOCRINE:  No polydipsia, polyuria  MUSCULOSKELETAL:  No pain or stiffness of the joints  NEUROLOGIC:  No weakness, sensory changes, seizures, confusion, memory loss, tremor or other abnormal movements    Current Evaluation:     Nutritional Screening: Considering the patient's height and weight, medications, medical history and preferences, should a referral be made to the dietitian? no    Constitutional  Vitals:  Most recent vital signs, dated less than 90 days prior to this appointment, were not reviewed.  There were no vitals filed for this visit.     General:  unremarkable, age appropriate     Musculoskeletal  Muscle Strength/Tone:  no tremor, no tic   Gait & Station:  non-ataxic     Psychiatric  Appearance: casually dressed & groomed;   Behavior: calm,   Cooperation: cooperative with assessment  Speech: normal rate, volume, tone  Thought Process: linear, goal-directed  Thought Content: No suicidal or homicidal ideation; no delusions  Affect: mildly anxious  Mood: mildly anxious  Perceptions: No auditory or visual hallucinations  Level of Consciousness: alert throughout interview  Insight: fair  Cognition: Oriented to person, place, time, & situation  Memory: no apparent deficits to general clinical interview; not " formally assessed  Attention/Concentration: no apparent deficits to general clinical interview; not formally assessed  Fund of Knowledge: average by vocabulary/education    Laboratory Data  No visits with results within 1 Month(s) from this visit.   Latest known visit with results is:   Lab Visit on 11/27/2017   Component Date Value Ref Range Status    Cholesterol 11/27/2017 230* 120 - 199 mg/dL Final    Triglycerides 11/27/2017 160* 30 - 150 mg/dL Final    HDL 11/27/2017 41  40 - 75 mg/dL Final    LDL Cholesterol 11/27/2017 157.0  63.0 - 159.0 mg/dL Final    HDL/Chol Ratio 11/27/2017 17.8* 20.0 - 50.0 % Final    Total Cholesterol/HDL Ratio 11/27/2017 5.6* 2.0 - 5.0 Final    Non-HDL Cholesterol 11/27/2017 189  mg/dL Final    Hepatitis C Ab 11/27/2017 Negative   Final    Sodium 11/27/2017 142  136 - 145 mmol/L Final    Potassium 11/27/2017 4.5  3.5 - 5.1 mmol/L Final    Chloride 11/27/2017 107  95 - 110 mmol/L Final    CO2 11/27/2017 27  23 - 29 mmol/L Final    Glucose 11/27/2017 105  70 - 110 mg/dL Final    BUN, Bld 11/27/2017 11  8 - 23 mg/dL Final    Creatinine 11/27/2017 0.8  0.5 - 1.4 mg/dL Final    Calcium 11/27/2017 9.9  8.7 - 10.5 mg/dL Final    Total Protein 11/27/2017 7.4  6.0 - 8.4 g/dL Final    Albumin 11/27/2017 3.9  3.5 - 5.2 g/dL Final    Total Bilirubin 11/27/2017 1.1* 0.1 - 1.0 mg/dL Final    Alkaline Phosphatase 11/27/2017 114  55 - 135 U/L Final    AST 11/27/2017 17  10 - 40 U/L Final    ALT 11/27/2017 13  10 - 44 U/L Final    Anion Gap 11/27/2017 8  8 - 16 mmol/L Final    eGFR if African American 11/27/2017 >60.0  >60 mL/min/1.73 m^2 Final    eGFR if non African American 11/27/2017 >60.0  >60 mL/min/1.73 m^2 Final     Medications  Outpatient Encounter Prescriptions as of 8/1/2018   Medication Sig Dispense Refill    DULoxetine (CYMBALTA) 30 MG capsule Take 1 capsule (30 mg total) by mouth once daily. 30 capsule 0    DULoxetine (CYMBALTA) 60 MG capsule Take 1 capsule (60  mg total) by mouth once daily. 30 capsule 0    nitrofurantoin, macrocrystal-monohydrate, (MACROBID) 100 MG capsule Take 1 capsule (100 mg total) by mouth 2 (two) times daily. 14 capsule 0    traZODone (DESYREL) 150 MG tablet TAKE 1/2-1 TABLET AT BEDTIME AS NEEDED FOR SLEEP 30 tablet 0     No facility-administered encounter medications on file as of 8/1/2018.      Assessment - Diagnosis - Goals:     Impression: 68 y/o F with hx of chronic, seeming intermittent paranoia, possibly hallucinations as well. Poor insight into nature of symptoms. Self-identifies her mental health problems as with anxiety, depression for years. Has had a P[facility] for concerns for paranoia and near-PEC for alleged neglect. Hasn't tolerated and self-discontinued 2 antipsychotics prescribed since last visit, reports didn't tolerate several others previously. No paranoia evident at this visit.     Dx: insomnia, anxiety, consider chronic psychotic disorders (no clear psychosis since last visit)    Treatment Goals:  Specify outcomes written in observable, behavioral terms:   Reduce paranoia. Control depression and anxiety symptoms by self-report    Treatment Plan/Recommendations:   · Trazodone for sleep. She takes duloxetine for pain and it seems to be helpful for mood. Observe for paranoia, other symptoms, consider antipsychotic treatment as indicated.   · Discussed risks, benefits, and alternatives to treatment plan documented above with patient. I answered all patient questions related to this plan and patient expressed understanding and agreement.   · She asked again for me to write a letter on her behalf with regard to medical advisability of gun ownership. I told her I couldn't do this in good conscience as she's been treated multiple times in past for paranoia. She has a copy of FBI information presented to my office last year.     Return to Clinic: 2 months    Counseling time: 5 minutes  Total time: 20 minutes    VIPIN Chavez MD

## 2018-08-01 ENCOUNTER — OFFICE VISIT (OUTPATIENT)
Dept: PSYCHIATRY | Facility: CLINIC | Age: 68
End: 2018-08-01
Payer: MEDICARE

## 2018-08-01 DIAGNOSIS — G47.00 INSOMNIA, UNSPECIFIED TYPE: Primary | ICD-10-CM

## 2018-08-01 DIAGNOSIS — F41.9 ANXIETY: ICD-10-CM

## 2018-08-01 PROCEDURE — 99999 PR PBB SHADOW E&M-EST. PATIENT-LVL I: CPT | Mod: PBBFAC,,, | Performed by: PSYCHIATRY & NEUROLOGY

## 2018-08-01 PROCEDURE — 99213 OFFICE O/P EST LOW 20 MIN: CPT | Mod: S$GLB,,, | Performed by: PSYCHIATRY & NEUROLOGY

## 2018-08-01 RX ORDER — DULOXETIN HYDROCHLORIDE 60 MG/1
60 CAPSULE, DELAYED RELEASE ORAL DAILY
Qty: 30 CAPSULE | Refills: 3 | Status: ON HOLD | OUTPATIENT
Start: 2018-08-01 | End: 2018-10-01 | Stop reason: HOSPADM

## 2018-08-01 RX ORDER — TRAZODONE HYDROCHLORIDE 150 MG/1
TABLET ORAL
Qty: 30 TABLET | Refills: 3 | Status: ON HOLD | OUTPATIENT
Start: 2018-08-01 | End: 2018-10-01 | Stop reason: HOSPADM

## 2018-08-01 RX ORDER — DULOXETIN HYDROCHLORIDE 30 MG/1
30 CAPSULE, DELAYED RELEASE ORAL DAILY
Qty: 30 CAPSULE | Refills: 3 | Status: ON HOLD | OUTPATIENT
Start: 2018-08-01 | End: 2018-10-01 | Stop reason: HOSPADM

## 2018-08-07 PROBLEM — F41.9 ANXIETY: Status: ACTIVE | Noted: 2018-08-07

## 2018-09-25 ENCOUNTER — HOSPITAL ENCOUNTER (EMERGENCY)
Facility: HOSPITAL | Age: 68
Discharge: PSYCHIATRIC OR SUBSTANCE USE FACILITY | End: 2018-09-26
Attending: EMERGENCY MEDICINE
Payer: MEDICARE

## 2018-09-25 DIAGNOSIS — F32.3 CURRENT SEVERE EPISODE OF MAJOR DEPRESSIVE DISORDER WITH PSYCHOTIC FEATURES WITHOUT PRIOR EPISODE: ICD-10-CM

## 2018-09-25 DIAGNOSIS — F22 PARANOID PSYCHOSIS: ICD-10-CM

## 2018-09-25 DIAGNOSIS — R31.9 URINARY TRACT INFECTION WITH HEMATURIA, SITE UNSPECIFIED: Primary | ICD-10-CM

## 2018-09-25 DIAGNOSIS — Z00.8 MEDICAL CLEARANCE FOR PSYCHIATRIC ADMISSION: ICD-10-CM

## 2018-09-25 DIAGNOSIS — N39.0 URINARY TRACT INFECTION WITH HEMATURIA, SITE UNSPECIFIED: Primary | ICD-10-CM

## 2018-09-25 LAB
ALBUMIN SERPL BCP-MCNC: 4.3 G/DL
ALP SERPL-CCNC: 81 U/L
ALT SERPL W/O P-5'-P-CCNC: 12 U/L
AMPHET+METHAMPHET UR QL: NEGATIVE
ANION GAP SERPL CALC-SCNC: 14 MMOL/L
APAP SERPL-MCNC: 3 UG/ML
AST SERPL-CCNC: 16 U/L
BACTERIA #/AREA URNS HPF: ABNORMAL /HPF
BARBITURATES UR QL SCN>200 NG/ML: NEGATIVE
BASOPHILS # BLD AUTO: 0.02 K/UL
BASOPHILS # BLD AUTO: 0.02 K/UL
BASOPHILS NFR BLD: 0.1 %
BASOPHILS NFR BLD: 0.1 %
BENZODIAZ UR QL SCN>200 NG/ML: NEGATIVE
BILIRUB SERPL-MCNC: 1.1 MG/DL
BILIRUB UR QL STRIP: NEGATIVE
BUN SERPL-MCNC: 7 MG/DL
BZE UR QL SCN: NEGATIVE
CALCIUM SERPL-MCNC: 10.6 MG/DL
CANNABINOIDS UR QL SCN: NEGATIVE
CHLORIDE SERPL-SCNC: 107 MMOL/L
CLARITY UR: ABNORMAL
CO2 SERPL-SCNC: 22 MMOL/L
COLOR UR: YELLOW
CREAT SERPL-MCNC: 0.8 MG/DL
CREAT UR-MCNC: 167.2 MG/DL
DIFFERENTIAL METHOD: ABNORMAL
DIFFERENTIAL METHOD: ABNORMAL
EOSINOPHIL # BLD AUTO: 0.1 K/UL
EOSINOPHIL # BLD AUTO: 0.1 K/UL
EOSINOPHIL NFR BLD: 0.3 %
EOSINOPHIL NFR BLD: 0.3 %
ERYTHROCYTE [DISTWIDTH] IN BLOOD BY AUTOMATED COUNT: 13.3 %
ERYTHROCYTE [DISTWIDTH] IN BLOOD BY AUTOMATED COUNT: 13.3 %
EST. GFR  (AFRICAN AMERICAN): >60 ML/MIN/1.73 M^2
EST. GFR  (NON AFRICAN AMERICAN): >60 ML/MIN/1.73 M^2
ETHANOL SERPL-MCNC: <10 MG/DL
GLUCOSE SERPL-MCNC: 129 MG/DL
GLUCOSE UR QL STRIP: NEGATIVE
HCT VFR BLD AUTO: 39.7 %
HCT VFR BLD AUTO: 39.7 %
HGB BLD-MCNC: 14.5 G/DL
HGB BLD-MCNC: 14.5 G/DL
HGB UR QL STRIP: ABNORMAL
HYALINE CASTS #/AREA URNS LPF: 0 /LPF
KETONES UR QL STRIP: ABNORMAL
LEUKOCYTE ESTERASE UR QL STRIP: ABNORMAL
LYMPHOCYTES # BLD AUTO: 2.7 K/UL
LYMPHOCYTES # BLD AUTO: 2.7 K/UL
LYMPHOCYTES NFR BLD: 17.9 %
LYMPHOCYTES NFR BLD: 17.9 %
MCH RBC QN AUTO: 32.3 PG
MCH RBC QN AUTO: 32.3 PG
MCHC RBC AUTO-ENTMCNC: 36.5 G/DL
MCHC RBC AUTO-ENTMCNC: 36.5 G/DL
MCV RBC AUTO: 88 FL
MCV RBC AUTO: 88 FL
METHADONE UR QL SCN>300 NG/ML: NEGATIVE
MICROSCOPIC COMMENT: ABNORMAL
MONOCYTES # BLD AUTO: 1.1 K/UL
MONOCYTES # BLD AUTO: 1.1 K/UL
MONOCYTES NFR BLD: 7.6 %
MONOCYTES NFR BLD: 7.6 %
NEUTROPHILS # BLD AUTO: 11 K/UL
NEUTROPHILS # BLD AUTO: 11 K/UL
NEUTROPHILS NFR BLD: 74.1 %
NEUTROPHILS NFR BLD: 74.1 %
NITRITE UR QL STRIP: NEGATIVE
OPIATES UR QL SCN: NEGATIVE
PCP UR QL SCN>25 NG/ML: NEGATIVE
PH UR STRIP: 8 [PH] (ref 5–8)
PLATELET # BLD AUTO: 289 K/UL
PLATELET # BLD AUTO: 289 K/UL
PMV BLD AUTO: 9.5 FL
PMV BLD AUTO: 9.5 FL
POTASSIUM SERPL-SCNC: 3.2 MMOL/L
PROT SERPL-MCNC: 8 G/DL
PROT UR QL STRIP: ABNORMAL
RBC # BLD AUTO: 4.49 M/UL
RBC # BLD AUTO: 4.49 M/UL
RBC #/AREA URNS HPF: 42 /HPF (ref 0–4)
SALICYLATES SERPL-MCNC: <5 MG/DL
SODIUM SERPL-SCNC: 143 MMOL/L
SP GR UR STRIP: 1.02 (ref 1–1.03)
SQUAMOUS #/AREA URNS HPF: 0 /HPF
TOXICOLOGY INFORMATION: NORMAL
TSH SERPL DL<=0.005 MIU/L-ACNC: 1.01 UIU/ML
URN SPEC COLLECT METH UR: ABNORMAL
UROBILINOGEN UR STRIP-ACNC: 1 EU/DL
WBC # BLD AUTO: 14.91 K/UL
WBC # BLD AUTO: 14.91 K/UL
WBC #/AREA URNS HPF: >100 /HPF (ref 0–5)

## 2018-09-25 PROCEDURE — 81000 URINALYSIS NONAUTO W/SCOPE: CPT | Mod: 59

## 2018-09-25 PROCEDURE — 87086 URINE CULTURE/COLONY COUNT: CPT

## 2018-09-25 PROCEDURE — 99285 EMERGENCY DEPT VISIT HI MDM: CPT | Mod: 25

## 2018-09-25 PROCEDURE — 84443 ASSAY THYROID STIM HORMONE: CPT

## 2018-09-25 PROCEDURE — 87077 CULTURE AEROBIC IDENTIFY: CPT

## 2018-09-25 PROCEDURE — 93005 ELECTROCARDIOGRAM TRACING: CPT

## 2018-09-25 PROCEDURE — 80307 DRUG TEST PRSMV CHEM ANLYZR: CPT

## 2018-09-25 PROCEDURE — G0425 INPT/ED TELECONSULT30: HCPCS | Mod: GT,,, | Performed by: PSYCHIATRY & NEUROLOGY

## 2018-09-25 PROCEDURE — 25000003 PHARM REV CODE 250: Performed by: EMERGENCY MEDICINE

## 2018-09-25 PROCEDURE — 80329 ANALGESICS NON-OPIOID 1 OR 2: CPT

## 2018-09-25 PROCEDURE — 87088 URINE BACTERIA CULTURE: CPT

## 2018-09-25 PROCEDURE — 93010 ELECTROCARDIOGRAM REPORT: CPT | Mod: ,,, | Performed by: INTERNAL MEDICINE

## 2018-09-25 PROCEDURE — 85025 COMPLETE CBC W/AUTO DIFF WBC: CPT

## 2018-09-25 PROCEDURE — 80320 DRUG SCREEN QUANTALCOHOLS: CPT

## 2018-09-25 PROCEDURE — 87186 SC STD MICRODIL/AGAR DIL: CPT

## 2018-09-25 PROCEDURE — 80053 COMPREHEN METABOLIC PANEL: CPT

## 2018-09-25 RX ORDER — CEFUROXIME AXETIL 250 MG/1
500 TABLET ORAL EVERY 12 HOURS
Status: DISCONTINUED | OUTPATIENT
Start: 2018-09-25 | End: 2018-09-26 | Stop reason: HOSPADM

## 2018-09-25 RX ORDER — QUETIAPINE FUMARATE 100 MG/1
100 TABLET, FILM COATED ORAL NIGHTLY
Status: DISCONTINUED | OUTPATIENT
Start: 2018-09-25 | End: 2018-09-26 | Stop reason: HOSPADM

## 2018-09-25 RX ADMIN — QUETIAPINE FUMARATE 100 MG: 100 TABLET ORAL at 11:09

## 2018-09-25 RX ADMIN — CEFUROXIME AXETIL 500 MG: 250 TABLET ORAL at 11:09

## 2018-09-26 VITALS
TEMPERATURE: 99 F | HEART RATE: 88 BPM | OXYGEN SATURATION: 95 % | DIASTOLIC BLOOD PRESSURE: 80 MMHG | SYSTOLIC BLOOD PRESSURE: 135 MMHG | RESPIRATION RATE: 18 BRPM | BODY MASS INDEX: 23.08 KG/M2 | HEIGHT: 64 IN

## 2018-09-26 PROBLEM — F29 PSYCHOSIS: Status: ACTIVE | Noted: 2018-09-26

## 2018-09-26 PROBLEM — N30.01 ACUTE CYSTITIS WITH HEMATURIA: Status: ACTIVE | Noted: 2018-09-26

## 2018-09-26 PROBLEM — D50.9 IRON DEFICIENCY ANEMIA: Status: ACTIVE | Noted: 2018-09-26

## 2018-09-26 PROBLEM — E87.6 HYPOKALEMIA: Status: ACTIVE | Noted: 2018-09-26

## 2018-09-26 NOTE — ED NOTES
Went into patients room. Patient has family/friend present. Patient states she was having hallucinations, delusions, and paranoia.

## 2018-09-26 NOTE — ED PROVIDER NOTES
SCRIBE #1 NOTE: I, Maxine Ornelas, am scribing for, and in the presence of, Vera Perez MD. I have scribed the entire note.      History      Chief Complaint   Patient presents with    Hallucinations     AH       Review of patient's allergies indicates:   Allergen Reactions    Codeine Swelling    Sulfa (sulfonamide antibiotics) Anaphylaxis    Asa-acetaminophen-caff-buffers         HPI   HPI    9/25/2018, 8:03 PM   History obtained from the patient and friend      History of Present Illness: Jaimee Mccarthy is a 67 y.o. female patient who presents to the Emergency Department for auditory hallucinations which onset gradually 1 year ago. Symptoms are constant and moderate in severity. Pt reports she has informed her psychiatrist about sxs. Per friend, pt was at her door and states someone was following her and someone was in her friend's attic. Per friend, pt has been paranoid lately. Pt went to the Fire Station who called EMS because pt thought her friend was being held [facility]. Pt reports she has been under a lot of stress lately. No mitigating or exacerbating factors reported. Associated sxs include HA and sleep disturbance. Patient denies any CP, SOB, fever, chills, n/v/d, SI, HI, self injury, and all other sxs at this time. Pt states she is compliant with her Cymbalta and trazodone. No further complaints or concerns at this time.     Arrival mode: EMS     PCP: Mihir Gibbs MD       Past Medical History:  Past Medical History:   Diagnosis Date    Cervical cancer        Past Surgical History:  Past Surgical History:   Procedure Laterality Date    MASTECTOMY           Family History:  Family History   Problem Relation Age of Onset    Cancer Father     Cancer Sister     Cancer Brother        Social History:  Social History     Tobacco Use    Smoking status: Former Smoker    Smokeless tobacco: Never Used   Substance and Sexual Activity    Alcohol use: Not given    Drug use: Not given    Sexual  activity: Not given       ROS   Review of Systems   Constitutional: Negative for appetite change, chills, fever and unexpected weight change.        (+) Increased stress   HENT: Negative for sore throat.    Respiratory: Negative for shortness of breath.    Cardiovascular: Negative for chest pain.   Gastrointestinal: Negative for abdominal pain, diarrhea, nausea and vomiting.   Genitourinary: Negative for dysuria.   Musculoskeletal: Negative for back pain and neck pain.   Skin: Negative for rash.   Neurological: Positive for headaches. Negative for dizziness, syncope, weakness and numbness.   Hematological: Does not bruise/bleed easily.   Psychiatric/Behavioral: Positive for hallucinations (auditory). Negative for agitation, self-injury and suicidal ideas. The patient is nervous/anxious.         (-) HI   All other systems reviewed and are negative.    Physical Exam      Initial Vitals   BP Pulse Resp Temp SpO2   09/25/18 1911 09/25/18 1911 09/25/18 1911 09/25/18 1911 09/25/18 2109   (!) 159/89 102 18 98.8 °F (37.1 °C) 99 %      MAP       --                 Physical Exam  Nursing Notes and Vital Signs Reviewed.  Constitutional: Patient is in no acute distress. Well-developed and well-nourished.  Head: Atraumatic. Normocephalic.  Eyes: PERRL. EOM intact. Conjunctivae are not pale. No scleral icterus.  ENT: Mucous membranes are moist. Oropharynx is clear and symmetric.    Neck: Supple. Full ROM. No lymphadenopathy.  Cardiovascular: Regular rate. Regular rhythm. No murmurs, rubs, or gallops. Distal pulses are 2+ and symmetric.  Pulmonary/Chest: No respiratory distress. Clear to auscultation bilaterally. No wheezing or rales.  Abdominal: Soft and non-distended.  There is no tenderness.  No rebound, guarding, or rigidity. Good bowel sounds.  Genitourinary: No CVA tenderness  Musculoskeletal: Moves all extremities. No obvious deformities. No edema. No calf tenderness.  Skin: Warm and dry.  Neurological:  Alert, awake, and  "appropriate.  Normal speech.  No acute focal neurological deficits are appreciated.  Psychiatric:               Behavior: normal, cooperative              Mood and Affect: normal affect              Thought Process: within normal limits              Suicidal Ideations: No              Suicidal Plan: No specific plan to harm self              Homicidal Ideations: No              Hallucinations: auditory    ED Course    Procedures  ED Vital Signs:  Vitals:    09/25/18 1911 09/25/18 2109 09/25/18 2147 09/25/18 2217   BP: (!) 159/89 126/67 130/66 (!) 130/59   Pulse: 102 84 88 84   Resp: 18 18 20 16   Temp: 98.8 °F (37.1 °C)      TempSrc: Oral      SpO2:  99% 96% 97%   Height: 5' 4" (1.626 m)       09/25/18 2232 09/25/18 2247 09/25/18 2302 09/26/18 0154   BP: 129/66 (!) 118/59 128/72 135/80   Pulse: 92 89 86 88   Resp: 18 18 20 18   Temp:       TempSrc:       SpO2: 96% 95% 98% 95%   Height:           Abnormal Lab Results:  Labs Reviewed   CBC W/ AUTO DIFFERENTIAL - Abnormal; Notable for the following components:       Result Value    WBC 14.91 (*)     MCH 32.3 (*)     MCHC 36.5 (*)     Gran # (ANC) 11.0 (*)     Mono # 1.1 (*)     Gran% 74.1 (*)     Lymph% 17.9 (*)     All other components within normal limits   CBC W/ AUTO DIFFERENTIAL - Abnormal; Notable for the following components:    WBC 14.91 (*)     MCH 32.3 (*)     MCHC 36.5 (*)     Gran # (ANC) 11.0 (*)     Mono # 1.1 (*)     Gran% 74.1 (*)     Lymph% 17.9 (*)     All other components within normal limits   COMPREHENSIVE METABOLIC PANEL - Abnormal; Notable for the following components:    Potassium 3.2 (*)     CO2 22 (*)     Glucose 129 (*)     BUN, Bld 7 (*)     Calcium 10.6 (*)     Total Bilirubin 1.1 (*)     All other components within normal limits   URINALYSIS, REFLEX TO URINE CULTURE - Abnormal; Notable for the following components:    Appearance, UA Cloudy (*)     Protein, UA 2+ (*)     Ketones, UA 1+ (*)     Occult Blood UA 2+ (*)     Leukocytes, UA 3+ " (*)     All other components within normal limits    Narrative:     Preferred Collection Type->Urine, Clean Catch   ACETAMINOPHEN LEVEL - Abnormal; Notable for the following components:    Acetaminophen (Tylenol), Serum 3.0 (*)     All other components within normal limits   SALICYLATE LEVEL - Abnormal; Notable for the following components:    Salicylate Lvl <5.0 (*)     All other components within normal limits   URINALYSIS MICROSCOPIC - Abnormal; Notable for the following components:    RBC, UA 42 (*)     WBC, UA >100 (*)     Bacteria, UA Many (*)     All other components within normal limits    Narrative:     Preferred Collection Type->Urine, Clean Catch   CULTURE, URINE   TSH   DRUG SCREEN PANEL, URINE EMERGENCY    Narrative:     Preferred Collection Type->Urine, Clean Catch   ALCOHOL,MEDICAL (ETHANOL)   URINALYSIS        All Lab Results:  Results for orders placed or performed during the hospital encounter of 09/25/18   CBC auto differential   Result Value Ref Range    WBC 14.91 (H) 3.90 - 12.70 K/uL    RBC 4.49 4.00 - 5.40 M/uL    Hemoglobin 14.5 12.0 - 16.0 g/dL    Hematocrit 39.7 37.0 - 48.5 %    MCV 88 82 - 98 fL    MCH 32.3 (H) 27.0 - 31.0 pg    MCHC 36.5 (H) 32.0 - 36.0 g/dL    RDW 13.3 11.5 - 14.5 %    Platelets 289 150 - 350 K/uL    MPV 9.5 9.2 - 12.9 fL    Gran # (ANC) 11.0 (H) 1.8 - 7.7 K/uL    Lymph # 2.7 1.0 - 4.8 K/uL    Mono # 1.1 (H) 0.3 - 1.0 K/uL    Eos # 0.1 0.0 - 0.5 K/uL    Baso # 0.02 0.00 - 0.20 K/uL    Gran% 74.1 (H) 38.0 - 73.0 %    Lymph% 17.9 (L) 18.0 - 48.0 %    Mono% 7.6 4.0 - 15.0 %    Eosinophil% 0.3 0.0 - 8.0 %    Basophil% 0.1 0.0 - 1.9 %    Differential Method Automated    CBC auto differential   Result Value Ref Range    WBC 14.91 (H) 3.90 - 12.70 K/uL    RBC 4.49 4.00 - 5.40 M/uL    Hemoglobin 14.5 12.0 - 16.0 g/dL    Hematocrit 39.7 37.0 - 48.5 %    MCV 88 82 - 98 fL    MCH 32.3 (H) 27.0 - 31.0 pg    MCHC 36.5 (H) 32.0 - 36.0 g/dL    RDW 13.3 11.5 - 14.5 %    Platelets 289  150 - 350 K/uL    MPV 9.5 9.2 - 12.9 fL    Gran # (ANC) 11.0 (H) 1.8 - 7.7 K/uL    Lymph # 2.7 1.0 - 4.8 K/uL    Mono # 1.1 (H) 0.3 - 1.0 K/uL    Eos # 0.1 0.0 - 0.5 K/uL    Baso # 0.02 0.00 - 0.20 K/uL    Gran% 74.1 (H) 38.0 - 73.0 %    Lymph% 17.9 (L) 18.0 - 48.0 %    Mono% 7.6 4.0 - 15.0 %    Eosinophil% 0.3 0.0 - 8.0 %    Basophil% 0.1 0.0 - 1.9 %    Differential Method Automated    Comprehensive metabolic panel   Result Value Ref Range    Sodium 143 136 - 145 mmol/L    Potassium 3.2 (L) 3.5 - 5.1 mmol/L    Chloride 107 95 - 110 mmol/L    CO2 22 (L) 23 - 29 mmol/L    Glucose 129 (H) 70 - 110 mg/dL    BUN, Bld 7 (L) 8 - 23 mg/dL    Creatinine 0.8 0.5 - 1.4 mg/dL    Calcium 10.6 (H) 8.7 - 10.5 mg/dL    Total Protein 8.0 6.0 - 8.4 g/dL    Albumin 4.3 3.5 - 5.2 g/dL    Total Bilirubin 1.1 (H) 0.1 - 1.0 mg/dL    Alkaline Phosphatase 81 55 - 135 U/L    AST 16 10 - 40 U/L    ALT 12 10 - 44 U/L    Anion Gap 14 8 - 16 mmol/L    eGFR if African American >60 >60 mL/min/1.73 m^2    eGFR if non African American >60 >60 mL/min/1.73 m^2   TSH   Result Value Ref Range    TSH 1.015 0.400 - 4.000 uIU/mL   Urinalysis, Reflex to Urine Culture Urine, Clean Catch   Result Value Ref Range    Specimen UA Urine, Clean Catch     Color, UA Yellow Yellow, Straw, Cesilia    Appearance, UA Cloudy (A) Clear    pH, UA 8.0 5.0 - 8.0    Specific Gravity, UA 1.020 1.005 - 1.030    Protein, UA 2+ (A) Negative    Glucose, UA Negative Negative    Ketones, UA 1+ (A) Negative    Bilirubin (UA) Negative Negative    Occult Blood UA 2+ (A) Negative    Nitrite, UA Negative Negative    Urobilinogen, UA 1.0 <2.0 EU/dL    Leukocytes, UA 3+ (A) Negative   Drug screen panel, emergency   Result Value Ref Range    Benzodiazepines Negative     Methadone metabolites Negative     Cocaine (Metab.) Negative     Opiate Scrn, Ur Negative     Barbiturate Screen, Ur Negative     Amphetamine Screen, Ur Negative     THC Negative     Phencyclidine Negative     Creatinine,  Random Ur 167.2 15.0 - 325.0 mg/dL    Toxicology Information SEE COMMENT    Ethanol   Result Value Ref Range    Alcohol, Medical, Serum <10 <10 mg/dL   Acetaminophen level   Result Value Ref Range    Acetaminophen (Tylenol), Serum 3.0 (L) 10.0 - 20.0 ug/mL   Salicylate level   Result Value Ref Range    Salicylate Lvl <5.0 (L) 15.0 - 30.0 mg/dL   Urinalysis Microscopic   Result Value Ref Range    RBC, UA 42 (H) 0 - 4 /hpf    WBC, UA >100 (H) 0 - 5 /hpf    Bacteria, UA Many (A) None-Occ /hpf    Squam Epithel, UA 0 /hpf    Hyaline Casts, UA 0 0-1/lpf /lpf    Microscopic Comment SEE COMMENT          Imaging Results:  Imaging Results          CT Head Without Contrast (Final result)  Result time 09/25/18 20:32:45    Final result by Calvin Schreiber MD (09/25/18 20:32:45)                 Impression:      Normal head CT.    All CT scans at this facility are performed  using dose modulation techniques as appropriate to performed exam including the following:  automated exposure control; adjustment of mA and/or kV according to the patients size (this includes techniques or standardized protocols for targeted exams where dose is matched to indication/reason for exam: i.e. extremities or head);  iterative reconstruction technique.      Electronically signed by: Calvin Schreiber MD  Date:    09/25/2018  Time:    20:32             Narrative:    EXAMINATION:  CT HEAD WITHOUT CONTRAST    CLINICAL HISTORY:  Transient alteration of awareness.;    TECHNIQUE:  Routine noncontrast head CT.    COMPARISON:  None    FINDINGS:  There is no acute intracranial hemorrhage or abnormal extra-axial fluid collection.  There is no abnormal increased or decreased density within the brain parenchyma.  Gray-white differentiation preserved.  Normal ventricles.  There is no intracranial mass or mass effect.  The calvarium is intact.  Visualized paranasal sinuses and mastoids are well aerated.                               The EKG was ordered, reviewed, and  independently interpreted by the ED provider.  Interpretation time: 20:48  Rate: 77 BPM  Rhythm: Sinus rhythm with occasional premature ventricular complexes  Interpretation: Nonspecific ST abnormality. Prolonged QT. No STEMI.             The Emergency Provider reviewed the vital signs and test results, which are outlined above.    ED Discussion     11:00 PM: Per friend, pt has been having sxs of paranoia for several months now. Pt reports she has been falling more frequently.    11:06 PM: Dr. Perez discussed the pt's case with Dr. Park (TelePsych) who recommends transferring pt to an inpatient psychiatric unit due to no one being able to watch her and prescribe pt Seroquel.     11:30 PM: The PEC hold has been issued by Dr. Perez at this time for further psychiatric evaluation.    12:30 PM: Pt has been medically cleared by Dr. Perez at this time. Reassessed pt at this time. Pt is resting comfortably and appears in no acute distress. There are no psychiatric services offered at this facility. D/w pt all pertinent ED information and plan to transfer to psychiatric facility for psychiatric treatment. Pt verbalizes understanding. Patient being transferred by [facility] for ongoing personal protection en route. Pt will be transported by personnel trained in CPR and CPI. All questions and complaints have been addressed at this time. Pt condition is stable at this time and is clear to transfer to psychiatric facility at this time.   Accepting Facility: [facility]  Accepting Physician: Dr. Fraser    ED Medication(s):  Medications - No data to display          Medical Decision Making    Medical Decision Making:   Clinical Tests:   Lab Tests: Ordered and Reviewed  Radiological Study: Ordered and Reviewed  Medical Tests: Ordered and Reviewed           Scribe Attestation:   Scribe #1: I performed the above scribed service and the documentation accurately describes the services I performed. I attest to the accuracy of the  note.    Attending:   Physician Attestation Statement for Scribe #1: I, Vera Perez MD, personally performed the services described in this documentation, as scribed by Maxine Ornelas, in my presence, and it is both accurate and complete.          Clinical Impression       ICD-10-CM ICD-9-CM   1. Urinary tract infection with hematuria, site unspecified N39.0 599.0    R31.9    2. Medical clearance for psychiatric admission Z00.8 V70.8   3. Paranoid psychosis F22 297.1   4. 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

## 2018-09-26 NOTE — ED NOTES
"Went into patients room to talk to patient. Asked pt what is going on that brought her to emergency room patient states "nothing". When asked why she is in the emergency room being seen she states "i don't know".   "

## 2018-09-26 NOTE — ED NOTES
Patient accepted to [facility] in [place]. Accepting Md is Dr. Fraser. Call report to [phone]. Spoke with Lisa with intake.

## 2018-09-26 NOTE — CONSULTS
"Tele-Consultation to Emergency Department from Psychiatry    Please see previous notes:    Patient agreeable to consultation via telepsychiatry.    Consultation started: 9/25/2018 at 9:30 PM  The chief complaint leading to psychiatric consultation is: "Hallucinations, delusions and paranoia "  This consultation was requested by Vera Camacho MD, the Emergency Department attending physician.  The location of the consulting psychiatrist is  [place].  The patient location is [facility].  The patient arrived at the ED at: 8:03 PM  Also present with the patient at the time of the consultation: Her Friend and ER Nurse    Patient Identification:  Jaimee Mccarthy is a 67 y.o. female.    Patient information was obtained from patient, past medical records and  ER Staff.  Patient presented involuntarily to the Emergency Department By EMS    History of Present Illness:     PER ER NOTE: Jaimee Mccarthy is a 67 y.o. female patient who presents to the Emergency Department for auditory hallucinations which onset gradually 1 year ago. Symptoms are constant and moderate in severity. Pt reports she has informed her psychiatrist about sxs. Per friend, pt was at her door and states someone was following her and someone was in her friend's attic. Per friend, pt has been paranoid lately. Pt went to the Fire Station who called EMS because pt thought her friend was being held at [facility]. Pt reports she has been under a lot of stress lately. Patient denies anySI, HI, self injury, and all other sxs at this time. Pt states she is compliant with her Cymbalta and trazodone.     Upon Evaluation: She says she is doing " Ok"' . Says her mood is stable with Cymbalta, but lately she has not been sleeping well and feeling paranoid , says she feels people are  following her, somebody is hiding in her house, scared about her safety , also reports seeing things but was not able to provide details about it ,denies to hearing voices , she reportedly went " to [employer]-station to get help bec she thought her friend is being held hostage at gun-point and she needs help. says she is taking Cymbalta for the past 3 years and it's working fine ,says Trazodone is not helping her with sleep , says she kept bumping the dose by herself went up to 200 mg and ran out of it , say she wants to try something else for sleep , kept asking her friend to answer the questions, says she is tired of answering same questions again and again. Has been taking trazodone since early 1990's , has been tried other meds but doesn't remember their names. Says she feels like she is losing her mind and having trouble remembering things.  Has been eating ok.Denies to SI,Intent or any active plans and HI. Says she lives alone and has no family support. Sees Psychiatrist as an outpatient basis and has an appointment with her psychiatrist in a month.    Psychiatric History:   Hospitalization: Yes, 8 years ago for depression and medication adjustment  Medication Trials: Yes  Suicide Attempts: no  Violence: No  Depression: Yes  Shey: No  AH's: Yes  Delusions: Yes    Review of Systems:  Negative except  Pain    Past Medical History:   Past Medical History:   Diagnosis Date    Cervical cancer         Seizures: Denies  Head trauma/l.o.c.: Denies  Wish to become pregnant[if female of childbearing age]: NA    Allergies: Listed below  Review of patient's allergies indicates:   Allergen Reactions    Codeine Swelling    Sulfa (sulfonamide antibiotics) Anaphylaxis    Asa-acetaminophen-caff-buffers        Medications in ER: Medications - No data to display    Medications at home:  Cymbalta 30 mg po QAM and 60 mg po QHS and Trazodone 100 mg po QHS    Substance Abuse History:   Alchohol: Denies  Drug: Denies     Legal History:   Past charges/incarcerations: Denies  Pending charges: None    Family Psychiatric History: Unknown    Social History:   History of Physical/Sexual Abuse: NA  Education: College   "  Employment/Disability: On SSI   Financial: Ok  Relationship Status/Sexual Orientation:  Single   Children: One Son 38 years old   Housing Status: Lives alone  Gnosticism: NA   History: No   Recreational Activities: Lost interest  Access to Gun: Denies     Current Evaluation:     Constitutional  Vitals:  Vitals:    09/25/18 1911   BP: (!) 159/89   Pulse: 102   Resp: 18   Temp: 98.8 °F (37.1 °C)   TempSrc: Oral   Height: 5' 4" (1.626 m)      General:  unremarkable, age appropriate     Musculoskeletal  Muscle Strength/Tone:   moving arms normally   Gait & Station:   sitting on stretcher     Psychiatric  Level of Consciousness: alert  Orientation: oriented to person, place and time  Grooming: in hospital gown  Psychomotor Behavior: no agitation  Speech: normal in rate, rhythm and volume  Language: uses words appropriately  Mood: " Ok"  Affect: Restricted  Thought Process: Org, at times TB  Associations: Intact  Thought Content: No SI or HI or + VH and+ paranoia ,no AH  Memory: Impaired  Attention: intact to interview  Fund of Knowledge: appears adequate  Insight: Somewhat fair  Judgement: Suspect    Relevant Elements of Neurological Exam: no abnormality of posture noted    Assessment - Diagnosis - Goals:     Diagnosis/Impression: MDD REC  Severe with Psychosis    Rec: Start her on Seroquel 100 mg po QHS for psychosis and sleep, cont. Cymbalta at the same dose , discont. Trazodone, PEC due to GD and she needs to be hospitalized in Inpatient Psych Unit for stabilization and safety .     Time with patient: 20 minutes    More than 50% of the time was spent counseling/coordinating care    Laboratory Data:   Labs Reviewed   CBC W/ AUTO DIFFERENTIAL - Abnormal; Notable for the following components:       Result Value    WBC 14.91 (*)     MCH 32.3 (*)     MCHC 36.5 (*)     Gran # (ANC) 11.0 (*)     Mono # 1.1 (*)     Gran% 74.1 (*)     Lymph% 17.9 (*)     All other components within normal limits   CBC W/ AUTO " DIFFERENTIAL - Abnormal; Notable for the following components:    WBC 14.91 (*)     MCH 32.3 (*)     MCHC 36.5 (*)     Gran # (ANC) 11.0 (*)     Mono # 1.1 (*)     Gran% 74.1 (*)     Lymph% 17.9 (*)     All other components within normal limits   COMPREHENSIVE METABOLIC PANEL - Abnormal; Notable for the following components:    Potassium 3.2 (*)     CO2 22 (*)     Glucose 129 (*)     BUN, Bld 7 (*)     Calcium 10.6 (*)     Total Bilirubin 1.1 (*)     All other components within normal limits   URINALYSIS, REFLEX TO URINE CULTURE - Abnormal; Notable for the following components:    Appearance, UA Cloudy (*)     Protein, UA 2+ (*)     Ketones, UA 1+ (*)     Occult Blood UA 2+ (*)     Leukocytes, UA 3+ (*)     All other components within normal limits    Narrative:     Preferred Collection Type->Urine, Clean Catch   ACETAMINOPHEN LEVEL - Abnormal; Notable for the following components:    Acetaminophen (Tylenol), Serum 3.0 (*)     All other components within normal limits   SALICYLATE LEVEL - Abnormal; Notable for the following components:    Salicylate Lvl <5.0 (*)     All other components within normal limits   URINALYSIS MICROSCOPIC - Abnormal; Notable for the following components:    RBC, UA 42 (*)     WBC, UA >100 (*)     Bacteria, UA Many (*)     All other components within normal limits    Narrative:     Preferred Collection Type->Urine, Clean Catch   CULTURE, URINE   TSH   DRUG SCREEN PANEL, URINE EMERGENCY    Narrative:     Preferred Collection Type->Urine, Clean Catch   ALCOHOL,MEDICAL (ETHANOL)   URINALYSIS     Thanks  Dr.Si Camacho for the consult.    Consulting clinician was informed of the encounter and consult note.    Consultation ended: 9/25/2018

## 2018-09-26 NOTE — ED NOTES
1 red hat, 1 blue shirt, 1 off white sports bra, 1 navy/white hair band, 1 Jas You jeans, 1 purse with items including jewelry, 1 pair of shoes, 1 specimen cup full of earings.    Items inventoried by myself and Osmani Hernández and locked in [facility] locker.

## 2018-09-27 LAB — BACTERIA UR CULT: NORMAL

## 2018-10-04 NOTE — PROGRESS NOTES
"Outpatient Psychiatry Follow-up Visit (MD/NP)    10/5/2018    Jaimee Mccarthy, a 67 y.o. female, presenting for follow-up visit. Met with patient.    Reason for Encounter: follow-up, paranoia    Interval Hx: Patient seen and interviewed for follow-up with neighbor and friend, Whitley Briggs. Recently hospitalized following episode of paranoia. Reports generally anxious, at baseline, functioning well since hospitalization. Hospitalization after went to this friend, told her "they're trying to kill me", "there's somebody in my attic". Friend looked in the attic, found no one there (consistent with past episodes). After this, patient called fire department, told them that her friend 'was being held hostage and needed a swat team". EMS arrived and took her to the hospital instead. Started to recognize it as a hallucination. Taking olanzapine, tolerating well, in addition to mirtazapine. Denies side effects. More anxious since duloxetine was stopped so "put myself back on the cymbalta", "been a miracle drug for me". Treated for UTI during hospitalization. Health otherwise good.     background: This 66 y/o F presents to [facility], previously a patient of Dr. Degroot(sp?) who she last saw about 3-4 months ago, but who she says disclosed protected health information to a family member so she is seeking new psychiatrist. Reports current medication regimen includes Cymbalta 90 mg (60 mg +30 mg) every morning, & doxepin 100 mg daily at bedtime. Says this regimen is working well. On 8.9.17 - had ER visit by [facility] after EMS called. EMS noted paranoia - cited that she'd ripped shingles off roof, told police she thought someone had been in house several days before after calling them due to things moved in her home, heard something on her roof & when she checked found vents weren't sealed, "strange wiring", called police. ED physician exam documented that she didn't seem to be hallucinating, delusional or thought-disordered and " "she was discharged. No SI/HI. No AVH. PsychHx: as above; "Depression with anxiety" - office-based outpt treatment, inpatient treatment - x2-3 (for changing sleep medication in context of highly distressing insomnia); Series of previous antidepressants - reports having when started Cymbalta 90 mg daily, helped moods far more than prior treatments ("when I started it I thought I was reborn"). Doxepin 100 mg qhs. Partially helpful for Insomnia; Two prior psychiatrist, previously Dr. Degroot(sp?) Treating her with same medications. Another at [facility], threatened to turn him in for medicaid fraud. 8.9.17 - ER visit by [facility] after EMS called. EMS noted paranoia - cited that she'd ripped shingles off roof, told police she thought someone had been in house several days before after calling them due to things moved in her home, heard something on her roof & when she checked found vents weren't sealed, "strange wiring", called police. No found to be psychotic on ED assessment. No other PEC's though had visit from a state agency in '16 due to concern by an unknown neighbor for "delusional", "acting out at her apartment" (report didn't describe her specific acting out behavior).  had  come out for a wellness check - "someone in the complex said I wasn't taking care of myself", said my "apartment was a wreck".  found her apartment in order, didn't take her in for care. Denies conflicts with others or reasons someone might make a bad shara report. Has been out of that complex x 5-6 months. One remote episode of passive suicidality in '13 when went in for lumpectomy - ended up with mastectomy, + lymph node dissection then learned pathology showed no cancer. FamHx: sister with serious mental illness, son dyslexia. MedHx: Rib Removed. Breast CA dx for which she had mastectomy - reports pathology later indicated she didn't have CA. Cervical CA (s/p cone) - History of cervical " "cancer many years ago: Basal and squamous cell CA (nares). Hyperlipidemia. Recurrent urinary tract infections. SocialHx: born in [facility], raised in . 3 sibs growing up; much older (18 and 20 years). lives in subsidized disability housing; sister institutionalized. Lived with patient's parents as an adult. On/off meds. Cycle of hospitalizations. Functioned ok on meds, not off. Doesn't know diagnoses. Normal socially & academically. 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  x 1 child. Was single mother. Lives in [place].  x 2 (,  36 years). Supportive people "all " - both parents and siblings . Trying to get moved in to house. Trying to volunteer with the food bank. Wants to join [employer].   Social security - recognized mental health disability.    Takes medication every day.     Review Of Systems:     GENERAL:  No weight gain or loss  SKIN:  No rashes or lacerations  HEAD:  No headaches  CHEST:  No shortness of breath, hyperventilation or cough  CARDIOVASCULAR:  No tachycardia or chest pain  ABDOMEN:  No nausea, vomiting, pain, constipation or diarrhea  URINARY:  No frequency, dysuria or sexual dysfunction  ENDOCRINE:  No polydipsia, polyuria  MUSCULOSKELETAL:  No pain or stiffness of the joints  NEUROLOGIC:  No weakness, sensory changes, seizures, confusion, memory loss, tremor or other abnormal movements    Current Evaluation:     Nutritional Screening: Considering the patient's height and weight, medications, medical history and preferences, should a referral be made to the dietitian? no    Constitutional  Vitals:  Most recent vital signs, dated less than 90 days prior to this appointment, were not reviewed.  There were no vitals filed for this visit.     General:  unremarkable, age appropriate     Musculoskeletal  Muscle Strength/Tone:  no tremor, no tic   Gait & Station:  non-ataxic     Psychiatric  Appearance: " casually dressed & groomed;   Behavior: calm,   Cooperation: cooperative with assessment  Speech: normal rate, volume, tone  Thought Process: linear, goal-directed  Thought Content: No suicidal or homicidal ideation; no delusions  Affect: mildly anxious  Mood: mildly anxious  Perceptions: No auditory or visual hallucinations  Level of Consciousness: alert throughout interview  Insight: fair  Cognition: Oriented to person, place, time, & situation  Memory: no apparent deficits to general clinical interview; not formally assessed  Attention/Concentration: no apparent deficits to general clinical interview; not formally assessed  Fund of Knowledge: average by vocabulary/education    Laboratory Data  Admission on 09/26/2018, Discharged on 10/01/2018   Component Date Value Ref Range Status    Hemoglobin A1C 09/26/2018 5.2  4.0 - 5.6 % Final    Estimated Avg Glucose 09/26/2018 103  68 - 131 mg/dL Final    RPR 09/27/2018 Non-reactive  Non-reactive Final    Vitamin B-12 09/27/2018 285  210 - 950 pg/mL Final    Folate 09/27/2018 6.3  4.0 - 24.0 ng/mL Final    Sodium 09/27/2018 143  136 - 145 mmol/L Final    Potassium 09/27/2018 4.3  3.5 - 5.1 mmol/L Final    Chloride 09/27/2018 108  95 - 110 mmol/L Final    CO2 09/27/2018 27  23 - 29 mmol/L Final    Glucose 09/27/2018 106  70 - 110 mg/dL Final    BUN, Bld 09/27/2018 13  7 - 17 mg/dL Final    Creatinine 09/27/2018 0.68  0.50 - 1.40 mg/dL Final    Calcium 09/27/2018 9.8  8.7 - 10.5 mg/dL Final    Anion Gap 09/27/2018 8  8 - 16 mmol/L Final    eGFR if African American 09/27/2018 >60.0  >60 mL/min/1.73 m^2 Final    eGFR if non African American 09/27/2018 >60.0  >60 mL/min/1.73 m^2 Final   Admission on 09/25/2018, Discharged on 09/26/2018   Component Date Value Ref Range Status    WBC 09/25/2018 14.91* 3.90 - 12.70 K/uL Final    RBC 09/25/2018 4.49  4.00 - 5.40 M/uL Final    Hemoglobin 09/25/2018 14.5  12.0 - 16.0 g/dL Final    Hematocrit 09/25/2018 39.7  37.0  - 48.5 % Final    MCV 09/25/2018 88  82 - 98 fL Final    MCH 09/25/2018 32.3* 27.0 - 31.0 pg Final    MCHC 09/25/2018 36.5* 32.0 - 36.0 g/dL Final    RDW 09/25/2018 13.3  11.5 - 14.5 % Final    Platelets 09/25/2018 289  150 - 350 K/uL Final    MPV 09/25/2018 9.5  9.2 - 12.9 fL Final    Gran # (ANC) 09/25/2018 11.0* 1.8 - 7.7 K/uL Final    Lymph # 09/25/2018 2.7  1.0 - 4.8 K/uL Final    Mono # 09/25/2018 1.1* 0.3 - 1.0 K/uL Final    Eos # 09/25/2018 0.1  0.0 - 0.5 K/uL Final    Baso # 09/25/2018 0.02  0.00 - 0.20 K/uL Final    Gran% 09/25/2018 74.1* 38.0 - 73.0 % Final    Lymph% 09/25/2018 17.9* 18.0 - 48.0 % Final    Mono% 09/25/2018 7.6  4.0 - 15.0 % Final    Eosinophil% 09/25/2018 0.3  0.0 - 8.0 % Final    Basophil% 09/25/2018 0.1  0.0 - 1.9 % Final    Differential Method 09/25/2018 Automated   Final    WBC 09/25/2018 14.91* 3.90 - 12.70 K/uL Final    RBC 09/25/2018 4.49  4.00 - 5.40 M/uL Final    Hemoglobin 09/25/2018 14.5  12.0 - 16.0 g/dL Final    Hematocrit 09/25/2018 39.7  37.0 - 48.5 % Final    MCV 09/25/2018 88  82 - 98 fL Final    MCH 09/25/2018 32.3* 27.0 - 31.0 pg Final    MCHC 09/25/2018 36.5* 32.0 - 36.0 g/dL Final    RDW 09/25/2018 13.3  11.5 - 14.5 % Final    Platelets 09/25/2018 289  150 - 350 K/uL Final    MPV 09/25/2018 9.5  9.2 - 12.9 fL Final    Gran # (ANC) 09/25/2018 11.0* 1.8 - 7.7 K/uL Final    Lymph # 09/25/2018 2.7  1.0 - 4.8 K/uL Final    Mono # 09/25/2018 1.1* 0.3 - 1.0 K/uL Final    Eos # 09/25/2018 0.1  0.0 - 0.5 K/uL Final    Baso # 09/25/2018 0.02  0.00 - 0.20 K/uL Final    Gran% 09/25/2018 74.1* 38.0 - 73.0 % Final    Lymph% 09/25/2018 17.9* 18.0 - 48.0 % Final    Mono% 09/25/2018 7.6  4.0 - 15.0 % Final    Eosinophil% 09/25/2018 0.3  0.0 - 8.0 % Final    Basophil% 09/25/2018 0.1  0.0 - 1.9 % Final    Differential Method 09/25/2018 Automated   Final    Sodium 09/25/2018 143  136 - 145 mmol/L Final    Potassium 09/25/2018 3.2* 3.5 - 5.1 mmol/L  Final    Chloride 09/25/2018 107  95 - 110 mmol/L Final    CO2 09/25/2018 22* 23 - 29 mmol/L Final    Glucose 09/25/2018 129* 70 - 110 mg/dL Final    BUN, Bld 09/25/2018 7* 8 - 23 mg/dL Final    Creatinine 09/25/2018 0.8  0.5 - 1.4 mg/dL Final    Calcium 09/25/2018 10.6* 8.7 - 10.5 mg/dL Final    Total Protein 09/25/2018 8.0  6.0 - 8.4 g/dL Final    Albumin 09/25/2018 4.3  3.5 - 5.2 g/dL Final    Total Bilirubin 09/25/2018 1.1* 0.1 - 1.0 mg/dL Final    Alkaline Phosphatase 09/25/2018 81  55 - 135 U/L Final    AST 09/25/2018 16  10 - 40 U/L Final    ALT 09/25/2018 12  10 - 44 U/L Final    Anion Gap 09/25/2018 14  8 - 16 mmol/L Final    eGFR if African American 09/25/2018 >60  >60 mL/min/1.73 m^2 Final    eGFR if non African American 09/25/2018 >60  >60 mL/min/1.73 m^2 Final    TSH 09/25/2018 1.015  0.400 - 4.000 uIU/mL Final    Specimen UA 09/25/2018 Urine, Clean Catch   Final    Color, UA 09/25/2018 Yellow  Yellow, Straw, Cesilia Final    Appearance, UA 09/25/2018 Cloudy* Clear Final    pH, UA 09/25/2018 8.0  5.0 - 8.0 Final    Specific Gravity, UA 09/25/2018 1.020  1.005 - 1.030 Final    Protein, UA 09/25/2018 2+* Negative Final    Glucose, UA 09/25/2018 Negative  Negative Final    Ketones, UA 09/25/2018 1+* Negative Final    Bilirubin (UA) 09/25/2018 Negative  Negative Final    Occult Blood UA 09/25/2018 2+* Negative Final    Nitrite, UA 09/25/2018 Negative  Negative Final    Urobilinogen, UA 09/25/2018 1.0  <2.0 EU/dL Final    Leukocytes, UA 09/25/2018 3+* Negative Final    Benzodiazepines 09/25/2018 Negative   Final    Methadone metabolites 09/25/2018 Negative   Final    Cocaine (Metab.) 09/25/2018 Negative   Final    Opiate Scrn, Ur 09/25/2018 Negative   Final    Barbiturate Screen, Ur 09/25/2018 Negative   Final    Amphetamine Screen, Ur 09/25/2018 Negative   Final    THC 09/25/2018 Negative   Final    Phencyclidine 09/25/2018 Negative   Final    Creatinine, Random Ur  09/25/2018 167.2  15.0 - 325.0 mg/dL Final    Toxicology Information 09/25/2018 SEE COMMENT   Final    Alcohol, Medical, Serum 09/25/2018 <10  <10 mg/dL Final    Acetaminophen (Tylenol), Serum 09/25/2018 3.0* 10.0 - 20.0 ug/mL Final    Salicylate Lvl 09/25/2018 <5.0* 15.0 - 30.0 mg/dL Final    RBC, UA 09/25/2018 42* 0 - 4 /hpf Final    WBC, UA 09/25/2018 >100* 0 - 5 /hpf Final    Bacteria, UA 09/25/2018 Many* None-Occ /hpf Final    Squam Epithel, UA 09/25/2018 0  /hpf Final    Hyaline Casts, UA 09/25/2018 0  0-1/lpf /lpf Final    Microscopic Comment 09/25/2018 SEE COMMENT   Final    Urine Culture, Routine 09/25/2018    Final                    Value:PROTEUS MIRABILIS  >100,000 cfu/ml       Medications  Outpatient Encounter Medications as of 10/5/2018   Medication Sig Dispense Refill    mirtazapine (REMERON) 7.5 MG Tab Take 1 tablet (7.5 mg total) by mouth every evening. 30 tablet 0    nitrofurantoin, macrocrystal-monohydrate, (MACROBID) 100 MG capsule Take 1 capsule (100 mg total) by mouth every 12 (twelve) hours. 4 capsule 0    OLANZapine (ZYPREXA) 5 MG tablet Take 1 tablet (5 mg total) by mouth nightly. 30 tablet 11     No facility-administered encounter medications on file as of 10/5/2018.      Assessment - Diagnosis - Goals:     Impression: 66 y/o F with hx of chronic, seeming intermittent paranoia, possibly hallucinations as well. Poor insight into nature of symptoms. Self-identifies her mental health problems as with anxiety, depression for years. Has had a PEC for concerns for paranoia and near-PEC for alleged neglect, hospitalization in 2018 for psychosis. Hasn't tolerated and self-discontinued 2 antipsychotics, but tolerates olanazpine ok. Free-floating anxiety unrelated to paranoia continues to be a problem.     Dx: psychosis. insomnia, anxiety,    Treatment Goals:  Specify outcomes written in observable, behavioral terms:   Reduce paranoia. Control depression and anxiety symptoms by  "self-report    Treatment Plan/Recommendations:   · Continue mirtazapine, olanzapine. Restart duloxetine for pain and mood. Observe for paranoia, other symptoms, stop duloxetine if symptoms return. Ms. Briggs to serve as "reality mentor", contact us should she notice Ms. Mccarthy paranoia returning/worsening.   · Discussed risks, benefits, and alternatives to treatment plan documented above with patient. I answered all patient questions related to this plan and patient expressed understanding and agreement.       Return to Clinic: 2 months    Counseling time: 10 minutes  Total time: 25 minutes    VIPIN Chavez MD  "

## 2018-10-05 ENCOUNTER — OFFICE VISIT (OUTPATIENT)
Dept: PSYCHIATRY | Facility: CLINIC | Age: 68
End: 2018-10-05
Payer: MEDICARE

## 2018-10-05 DIAGNOSIS — F41.9 ANXIETY: ICD-10-CM

## 2018-10-05 DIAGNOSIS — F29 PSYCHOSIS, UNSPECIFIED PSYCHOSIS TYPE: Primary | ICD-10-CM

## 2018-10-05 PROCEDURE — 1101F PT FALLS ASSESS-DOCD LE1/YR: CPT | Mod: CPTII,,, | Performed by: PSYCHIATRY & NEUROLOGY

## 2018-10-05 PROCEDURE — 99214 OFFICE O/P EST MOD 30 MIN: CPT | Mod: S$PBB,,, | Performed by: PSYCHIATRY & NEUROLOGY

## 2018-10-05 RX ORDER — OLANZAPINE 5 MG/1
5 TABLET ORAL NIGHTLY
Qty: 30 TABLET | Refills: 2 | Status: SHIPPED | OUTPATIENT
Start: 2018-10-05 | End: 2018-11-02 | Stop reason: SDUPTHER

## 2018-10-05 RX ORDER — MIRTAZAPINE 7.5 MG/1
7.5 TABLET, FILM COATED ORAL NIGHTLY
Qty: 30 TABLET | Refills: 2 | Status: SHIPPED | OUTPATIENT
Start: 2018-10-05 | End: 2018-11-02 | Stop reason: SDUPTHER

## 2018-10-05 RX ORDER — DULOXETIN HYDROCHLORIDE 30 MG/1
30 CAPSULE, DELAYED RELEASE ORAL DAILY
Qty: 30 CAPSULE | Refills: 2 | Status: SHIPPED | OUTPATIENT
Start: 2018-10-05 | End: 2018-11-02 | Stop reason: SDUPTHER

## 2018-10-05 RX ORDER — BUSPIRONE HYDROCHLORIDE 30 MG/1
30 TABLET ORAL 2 TIMES DAILY
Qty: 60 TABLET | Refills: 2 | Status: SHIPPED | OUTPATIENT
Start: 2018-10-05 | End: 2018-11-02 | Stop reason: SDUPTHER

## 2018-11-02 ENCOUNTER — OFFICE VISIT (OUTPATIENT)
Dept: PSYCHIATRY | Facility: CLINIC | Age: 68
End: 2018-11-02
Payer: MEDICARE

## 2018-11-02 DIAGNOSIS — F29 PSYCHOSIS, UNSPECIFIED PSYCHOSIS TYPE: Primary | ICD-10-CM

## 2018-11-02 DIAGNOSIS — F41.9 ANXIETY: ICD-10-CM

## 2018-11-02 PROCEDURE — 99214 OFFICE O/P EST MOD 30 MIN: CPT | Mod: HCNC,S$GLB,, | Performed by: PSYCHIATRY & NEUROLOGY

## 2018-11-02 PROCEDURE — 1101F PT FALLS ASSESS-DOCD LE1/YR: CPT | Mod: CPTII,HCNC,S$GLB, | Performed by: PSYCHIATRY & NEUROLOGY

## 2018-11-02 PROCEDURE — 99999 PR PBB SHADOW E&M-EST. PATIENT-LVL I: CPT | Mod: PBBFAC,HCNC,, | Performed by: PSYCHIATRY & NEUROLOGY

## 2018-11-02 RX ORDER — DULOXETIN HYDROCHLORIDE 60 MG/1
60 CAPSULE, DELAYED RELEASE ORAL DAILY
Qty: 30 CAPSULE | Refills: 2 | Status: SHIPPED | OUTPATIENT
Start: 2018-11-02 | End: 2019-02-05 | Stop reason: SDUPTHER

## 2018-11-02 RX ORDER — DULOXETIN HYDROCHLORIDE 30 MG/1
30 CAPSULE, DELAYED RELEASE ORAL DAILY
Qty: 30 CAPSULE | Refills: 1 | Status: SHIPPED | OUTPATIENT
Start: 2018-11-02 | End: 2018-12-27 | Stop reason: SDUPTHER

## 2018-11-02 RX ORDER — BUSPIRONE HYDROCHLORIDE 30 MG/1
30 TABLET ORAL 2 TIMES DAILY
Qty: 60 TABLET | Refills: 1 | Status: SHIPPED | OUTPATIENT
Start: 2018-11-02 | End: 2018-12-31 | Stop reason: SDUPTHER

## 2018-11-02 RX ORDER — MIRTAZAPINE 7.5 MG/1
7.5 TABLET, FILM COATED ORAL NIGHTLY
Qty: 30 TABLET | Refills: 2 | Status: SHIPPED | OUTPATIENT
Start: 2018-11-02 | End: 2018-11-20 | Stop reason: SDUPTHER

## 2018-11-02 RX ORDER — OLANZAPINE 5 MG/1
5 TABLET ORAL NIGHTLY
Qty: 30 TABLET | Refills: 1 | Status: SHIPPED | OUTPATIENT
Start: 2018-11-02 | End: 2018-11-20 | Stop reason: SDUPTHER

## 2018-11-02 NOTE — PROGRESS NOTES
"Outpatient Psychiatry Follow-up Visit (MD/NP)    11/2/2018    Jaimee Mccarthy, a 67 y.o. female, presenting for follow-up visit. Met with patient.    Reason for Encounter: follow-up, paranoia    Interval Hx: Pt seen and interviewed for follow-up, about 1 month since last follow-up which was following a hospitalization for psychosis. Patient has been doing well. Taking meds regularly (except has been taking buspirone prn). Has "weaned off Dr. Lee", trying to be more active. Has lost 8 pounds since last visit. Denies feeling suspicious or worried. Friend Whitley has been giving her good feedback re: mental health. Attends [employer] study together. No new symptoms.     background: This 66 y/o F presents to [facility], previously a patient of Dr. Degroot(sp?) who she last saw about 3-4 months ago, but who she says disclosed protected health information to a family member so she is seeking new psychiatrist. Reports current medication regimen includes Cymbalta 90 mg (60 mg +30 mg) every morning, & doxepin 100 mg daily at bedtime. Says this regimen is working well. On 8.9.17 - had ER visit by [facility] after EMS called. EMS noted paranoia - cited that she'd ripped shingles off roof, told police she thought someone had been in house several days before after calling them due to things moved in her home, heard something on her roof & when she checked found vents weren't sealed, "strange wiring", called police. ED physician exam documented that she didn't seem to be hallucinating, delusional or thought-disordered and she was discharged. No SI/HI. No AVH. PsychHx: as above; "Depression with anxiety" - office-based outpt treatment, inpatient treatment - x2-3 (for changing sleep medication in context of highly distressing insomnia); Series of previous antidepressants - reports having when started Cymbalta 90 mg daily, helped moods far more than prior treatments ("when I started it I thought I was reborn"). Doxepin 100 mg qhs. " "Partially helpful for Insomnia; Two prior psychiatrist, previously Dr. Degroot(sp?) Treating her with same medications. Another at [facility], threatened to turn him in for medicaid fraud. 8.9.17 - ER visit by [facility] after EMS called. EMS noted paranoia - cited that she'd ripped shingles off roof, told police she thought someone had been in house several days before after calling them due to things moved in her home, heard something on her roof & when she checked found vents weren't sealed, "strange wiring", called police. No found to be psychotic on ED assessment. No other PEC's though had visit from a state agency in '16 due to concern by an unknown neighbor for "delusional", "acting out at her apartment" (report didn't describe her specific acting out behavior).  had  come out for a wellness check - "someone in the complex said I wasn't taking care of myself", said my "apartment was a wreck".  found her apartment in order, didn't take her in for care. Denies conflicts with others or reasons someone might make a bad shara report. Has been out of that complex x 5-6 months. One remote episode of passive suicidality in '13 when went in for lumpectomy - ended up with mastectomy, + lymph node dissection then learned pathology showed no cancer. FamHx: sister with serious mental illness, son dyslexia. MedHx: Rib Removed. Breast CA dx for which she had mastectomy - reports pathology later indicated she didn't have CA. Cervical CA (s/p cone) - History of cervical cancer many years ago: Basal and squamous cell CA (nares). Hyperlipidemia. Recurrent urinary tract infections. SocialHx: born in [facility], raised in . 3 sibs growing up; much older (18 and 20 years). lives in subsidized disability housing; sister institutionalized. Lived with patient's parents as an adult. On/off meds. Cycle of hospitalizations. Functioned ok on meds, not off. Doesn't know diagnoses. Normal " "socially & academically. 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  x 1 child. Was single mother. Lives in [place].  x 2 (,  36 years). Supportive people "all " - both parents and siblings . Trying to get moved in to house. Trying to volunteer with the food bank. Wants to join [employer].   Social security - recognized mental health disability.    Takes medication every day.     Review Of Systems:     GENERAL:  No weight gain or loss  SKIN:  No rashes or lacerations  HEAD:  No headaches  CHEST:  No shortness of breath, hyperventilation or cough  CARDIOVASCULAR:  No tachycardia or chest pain  ABDOMEN:  No nausea, vomiting, pain, constipation or diarrhea  URINARY:  No frequency, dysuria or sexual dysfunction  ENDOCRINE:  No polydipsia, polyuria  MUSCULOSKELETAL:  No pain or stiffness of the joints  NEUROLOGIC:  No weakness, sensory changes, seizures, confusion, memory loss, tremor or other abnormal movements    Current Evaluation:     Nutritional Screening: Considering the patient's height and weight, medications, medical history and preferences, should a referral be made to the dietitian? no    Constitutional  Vitals:  Most recent vital signs, dated less than 90 days prior to this appointment, were not reviewed.  There were no vitals filed for this visit.     General:  unremarkable, age appropriate     Musculoskeletal  Muscle Strength/Tone:  no tremor, no tic   Gait & Station:  non-ataxic     Psychiatric  Appearance: casually dressed & groomed;   Behavior: calm,   Cooperation: cooperative with assessment  Speech: normal rate, volume, tone  Thought Process: linear, goal-directed  Thought Content: No suicidal or homicidal ideation; no delusions  Affect: mildly anxious  Mood: mildly anxious  Perceptions: No auditory or visual hallucinations  Level of Consciousness: alert throughout interview  Insight: fair  Cognition: Oriented to " person, place, time, & situation  Memory: no apparent deficits to general clinical interview; not formally assessed  Attention/Concentration: no apparent deficits to general clinical interview; not formally assessed  Fund of Knowledge: average by vocabulary/education    Laboratory Data  No visits with results within 1 Month(s) from this visit.   Latest known visit with results is:   Admission on 09/26/2018, Discharged on 10/01/2018   Component Date Value Ref Range Status    Hemoglobin A1C 09/26/2018 5.2  4.0 - 5.6 % Final    Estimated Avg Glucose 09/26/2018 103  68 - 131 mg/dL Final    RPR 09/27/2018 Non-reactive  Non-reactive Final    Vitamin B-12 09/27/2018 285  210 - 950 pg/mL Final    Folate 09/27/2018 6.3  4.0 - 24.0 ng/mL Final    Sodium 09/27/2018 143  136 - 145 mmol/L Final    Potassium 09/27/2018 4.3  3.5 - 5.1 mmol/L Final    Chloride 09/27/2018 108  95 - 110 mmol/L Final    CO2 09/27/2018 27  23 - 29 mmol/L Final    Glucose 09/27/2018 106  70 - 110 mg/dL Final    BUN, Bld 09/27/2018 13  7 - 17 mg/dL Final    Creatinine 09/27/2018 0.68  0.50 - 1.40 mg/dL Final    Calcium 09/27/2018 9.8  8.7 - 10.5 mg/dL Final    Anion Gap 09/27/2018 8  8 - 16 mmol/L Final    eGFR if African American 09/27/2018 >60.0  >60 mL/min/1.73 m^2 Final    eGFR if non African American 09/27/2018 >60.0  >60 mL/min/1.73 m^2 Final     Medications  Outpatient Encounter Medications as of 11/2/2018   Medication Sig Dispense Refill    busPIRone (BUSPAR) 30 MG Tab Take 1 tablet (30 mg total) by mouth 2 (two) times daily. 60 tablet 2    DULoxetine (CYMBALTA) 30 MG capsule Take 1 capsule (30 mg total) by mouth once daily. 30 capsule 2    mirtazapine (REMERON) 7.5 MG Tab Take 1 tablet (7.5 mg total) by mouth every evening. 30 tablet 2    nitrofurantoin, macrocrystal-monohydrate, (MACROBID) 100 MG capsule Take 1 capsule (100 mg total) by mouth every 12 (twelve) hours. 4 capsule 0    OLANZapine (ZYPREXA) 5 MG tablet Take 1  "tablet (5 mg total) by mouth nightly. 30 tablet 2     No facility-administered encounter medications on file as of 11/2/2018.      Assessment - Diagnosis - Goals:     Impression: 68 y/o F with hx of chronic, seeming intermittent paranoia, possibly hallucinations as well. Poor insight into nature of symptoms. Self-identifies her mental health problems as with anxiety, depression for years. Has had a PEC for concerns for paranoia and near-PEC for alleged neglect, hospitalization in 2018 for psychosis. Hasn't tolerated and self-discontinued 2 antipsychotics, but tolerates olanazpine ok. Free-floating anxiety unrelated to paranoia continues to be a problem. No psychosis recurrence since hospitalization, on antipsychotic.     Dx: psychosis. insomnia, anxiety,    Treatment Goals:  Specify outcomes written in observable, behavioral terms:   Reduce paranoia. Control depression and anxiety symptoms by self-report    Treatment Plan/Recommendations:   · Continue mirtazapine, olanzapine, duloxetine for pain and mood. add buspirone for anxiety.Observe for paranoia, other symptoms, stop duloxetine if symptoms return. Ms. Briggs to serve as "reality mentor", contact us should she notice Ms. Mccarthy paranoia returning/worsening.   · Discussed risks, benefits, and alternatives to treatment plan documented above with patient. I answered all patient questions related to this plan and patient expressed understanding and agreement.     Return to Clinic: 2 months    Counseling time: 10 minutes  Total time: 25 minutes    VIPIN Chavez MD  "

## 2018-11-19 RX ORDER — MIRTAZAPINE 15 MG/1
TABLET, FILM COATED ORAL
Qty: 15 TABLET | Refills: 0 | OUTPATIENT
Start: 2018-11-19

## 2018-11-20 RX ORDER — MIRTAZAPINE 7.5 MG/1
7.5 TABLET, FILM COATED ORAL NIGHTLY
Qty: 30 TABLET | Refills: 2 | Status: SHIPPED | OUTPATIENT
Start: 2018-11-20 | End: 2019-02-05 | Stop reason: SDUPTHER

## 2018-11-20 RX ORDER — OLANZAPINE 5 MG/1
5 TABLET ORAL NIGHTLY
Qty: 30 TABLET | Refills: 1 | Status: SHIPPED | OUTPATIENT
Start: 2018-11-20 | End: 2019-01-14

## 2018-12-24 ENCOUNTER — PATIENT OUTREACH (OUTPATIENT)
Dept: ADMINISTRATIVE | Facility: HOSPITAL | Age: 68
End: 2018-12-24

## 2018-12-24 NOTE — PROGRESS NOTES
left @ [phone] re: annual F/U. Pt returned call, sched 1/14/19, reminder mailed-[employer] yearly visit

## 2018-12-27 RX ORDER — DULOXETIN HYDROCHLORIDE 30 MG/1
CAPSULE, DELAYED RELEASE ORAL
Qty: 30 CAPSULE | Refills: 1 | Status: SHIPPED | OUTPATIENT
Start: 2018-12-27 | End: 2019-02-05 | Stop reason: SDUPTHER

## 2019-01-01 RX ORDER — BUSPIRONE HYDROCHLORIDE 30 MG/1
TABLET ORAL
Qty: 60 TABLET | Refills: 0 | Status: SHIPPED | OUTPATIENT
Start: 2019-01-01 | End: 2019-01-26 | Stop reason: SDUPTHER

## 2019-01-07 ENCOUNTER — PATIENT OUTREACH (OUTPATIENT)
Dept: ADMINISTRATIVE | Facility: HOSPITAL | Age: 69
End: 2019-01-07

## 2019-01-07 NOTE — PROGRESS NOTES
I spoke with pt that stated she will call back in a few days to schedule her appt. Pt verbalized understanding of needed appt.

## 2019-01-14 ENCOUNTER — OFFICE VISIT (OUTPATIENT)
Dept: FAMILY MEDICINE | Facility: CLINIC | Age: 69
End: 2019-01-14
Payer: MEDICARE

## 2019-01-14 VITALS
BODY MASS INDEX: 23.64 KG/M2 | DIASTOLIC BLOOD PRESSURE: 80 MMHG | WEIGHT: 138.44 LBS | TEMPERATURE: 98 F | SYSTOLIC BLOOD PRESSURE: 118 MMHG | HEART RATE: 91 BPM | OXYGEN SATURATION: 98 % | HEIGHT: 64 IN

## 2019-01-14 DIAGNOSIS — Z85.41 HISTORY OF CERVICAL CANCER: ICD-10-CM

## 2019-01-14 DIAGNOSIS — F32.A DEPRESSION, UNSPECIFIED DEPRESSION TYPE: ICD-10-CM

## 2019-01-14 DIAGNOSIS — I89.0 LYMPHEDEMA OF LEFT ARM: ICD-10-CM

## 2019-01-14 DIAGNOSIS — F29 PSYCHOSIS, UNSPECIFIED PSYCHOSIS TYPE: ICD-10-CM

## 2019-01-14 DIAGNOSIS — R35.0 URINARY FREQUENCY: ICD-10-CM

## 2019-01-14 DIAGNOSIS — F33.41 RECURRENT MAJOR DEPRESSIVE DISORDER, IN PARTIAL REMISSION: ICD-10-CM

## 2019-01-14 DIAGNOSIS — Z23 NEED FOR VACCINATION: ICD-10-CM

## 2019-01-14 DIAGNOSIS — Z12.11 SCREENING FOR COLON CANCER: ICD-10-CM

## 2019-01-14 DIAGNOSIS — Z90.12 STATUS POST LEFT MASTECTOMY: ICD-10-CM

## 2019-01-14 DIAGNOSIS — Z00.00 ANNUAL PHYSICAL EXAM: Primary | ICD-10-CM

## 2019-01-14 LAB
BILIRUB SERPL-MCNC: NEGATIVE MG/DL
BLOOD URINE, POC: ABNORMAL
COLOR, POC UA: ABNORMAL
GLUCOSE UR QL STRIP: NORMAL
KETONES UR QL STRIP: NEGATIVE
LEUKOCYTE ESTERASE URINE, POC: NEGATIVE
NITRITE, POC UA: NEGATIVE
PH, POC UA: 5
PROTEIN, POC: ABNORMAL
SPECIFIC GRAVITY, POC UA: 1.02
UROBILINOGEN, POC UA: NORMAL

## 2019-01-14 PROCEDURE — G0009 PNEUMOCOCCAL POLYSACCHARIDE VACCINE 23-VALENT =>2YO SQ IM: ICD-10-PCS | Mod: HCNC,S$GLB,, | Performed by: FAMILY MEDICINE

## 2019-01-14 PROCEDURE — 81002 URINALYSIS NONAUTO W/O SCOPE: CPT | Mod: HCNC,S$GLB,, | Performed by: FAMILY MEDICINE

## 2019-01-14 PROCEDURE — 87086 URINE CULTURE/COLONY COUNT: CPT | Mod: HCNC

## 2019-01-14 PROCEDURE — 81002 POCT URINE DIPSTICK WITHOUT MICROSCOPE: ICD-10-PCS | Mod: HCNC,S$GLB,, | Performed by: FAMILY MEDICINE

## 2019-01-14 PROCEDURE — 99999 PR PBB SHADOW E&M-EST. PATIENT-LVL IV: CPT | Mod: PBBFAC,HCNC,, | Performed by: FAMILY MEDICINE

## 2019-01-14 PROCEDURE — 99999 PR PBB SHADOW E&M-EST. PATIENT-LVL IV: ICD-10-PCS | Mod: PBBFAC,HCNC,, | Performed by: FAMILY MEDICINE

## 2019-01-14 PROCEDURE — 90662 FLU VACCINE - HIGH DOSE (65+) PRESERVATIVE FREE IM: ICD-10-PCS | Mod: HCNC,S$GLB,, | Performed by: FAMILY MEDICINE

## 2019-01-14 PROCEDURE — 90662 IIV NO PRSV INCREASED AG IM: CPT | Mod: HCNC,S$GLB,, | Performed by: FAMILY MEDICINE

## 2019-01-14 PROCEDURE — 90732 PPSV23 VACC 2 YRS+ SUBQ/IM: CPT | Mod: HCNC,S$GLB,, | Performed by: FAMILY MEDICINE

## 2019-01-14 PROCEDURE — G0009 ADMIN PNEUMOCOCCAL VACCINE: HCPCS | Mod: HCNC,S$GLB,, | Performed by: FAMILY MEDICINE

## 2019-01-14 PROCEDURE — 90732 PNEUMOCOCCAL POLYSACCHARIDE VACCINE 23-VALENT =>2YO SQ IM: ICD-10-PCS | Mod: HCNC,S$GLB,, | Performed by: FAMILY MEDICINE

## 2019-01-14 PROCEDURE — G0008 ADMIN INFLUENZA VIRUS VAC: HCPCS | Mod: HCNC,S$GLB,, | Performed by: FAMILY MEDICINE

## 2019-01-14 PROCEDURE — 99397 PER PM REEVAL EST PAT 65+ YR: CPT | Mod: 25,HCNC,S$GLB, | Performed by: FAMILY MEDICINE

## 2019-01-14 PROCEDURE — 99397 PR PREVENTIVE VISIT,EST,65 & OVER: ICD-10-PCS | Mod: 25,HCNC,S$GLB, | Performed by: FAMILY MEDICINE

## 2019-01-14 PROCEDURE — G0008 FLU VACCINE - HIGH DOSE (65+) PRESERVATIVE FREE IM: ICD-10-PCS | Mod: HCNC,S$GLB,, | Performed by: FAMILY MEDICINE

## 2019-01-14 NOTE — PROGRESS NOTES
"Subjective:       Patient ID: Jaimee Mccarthy is a 68 y.o. female.    Chief Complaint: No chief complaint on file.      HPI Comments:       Current Outpatient Medications:     busPIRone (BUSPAR) 30 MG Tab, TAKE 1 TABLET BY MOUTH TWICE DAILY FOR ANXIETY, Disp: 60 tablet, Rfl: 0    DULoxetine (CYMBALTA) 30 MG capsule, TAKE ONE CAPSULE BY MOUTH DAILY, Disp: 30 capsule, Rfl: 1    DULoxetine (CYMBALTA) 60 MG capsule, Take 1 capsule (60 mg total) by mouth once daily., Disp: 30 capsule, Rfl: 2    mirtazapine (REMERON) 7.5 MG Tab, Take 1 tablet (7.5 mg total) by mouth every evening., Disp: 30 tablet, Rfl: 2    nitrofurantoin, macrocrystal-monohydrate, (MACROBID) 100 MG capsule, Take 1 capsule (100 mg total) by mouth every 12 (twelve) hours., Disp: 4 capsule, Rfl: 0      First follow-up with me in over a year.  Had some significant psychiatric issues in the past few months.  Now seems to be on a stable pattern of psychotropic medications says that her mood is good.    Complains today of not being able to empty her bladder and having urinary frequency for the last 2 months.  No dysuria.  No fever chills.  Never had this before.    Also complaining of chronic lymphedema in her left arm ever since having a mastectomy on that side.      Review of Systems   Constitutional: Negative for activity change, appetite change and fever.   HENT: Negative for sore throat.    Respiratory: Negative for cough and shortness of breath.    Cardiovascular: Negative for chest pain.   Gastrointestinal: Negative for abdominal pain, diarrhea and nausea.   Genitourinary: Positive for decreased urine volume, difficulty urinating and frequency.   Musculoskeletal: Negative for arthralgias and myalgias.   Neurological: Negative for dizziness and headaches.       Objective:      Vitals:    01/14/19 1115   BP: 118/80   Pulse: 91   Temp: 97.7 °F (36.5 °C)   SpO2: 98%   Weight: 62.8 kg (138 lb 7.2 oz)   Height: 5' 4" (1.626 m)   PainSc:   6     Physical " Exam   Constitutional: She is oriented to person, place, and time. She appears well-developed and well-nourished. No distress.   HENT:   Head: Normocephalic.   Mouth/Throat: No oropharyngeal exudate.   Neck: Neck supple. No thyromegaly present.   Cardiovascular: Normal rate, regular rhythm and normal heart sounds.   No murmur heard.  Pulmonary/Chest: Effort normal and breath sounds normal. She has no wheezes. She has no rales.   Abdominal: Soft. She exhibits no distension. There is no tenderness.   Musculoskeletal: She exhibits edema.   Left arm with significant nonpitting edema versus right   Lymphadenopathy:     She has no cervical adenopathy.   Neurological: She is alert and oriented to person, place, and time.   Skin: Skin is warm and dry. She is not diaphoretic.   Psychiatric: She has a normal mood and affect. Her behavior is normal. Judgment and thought content normal.   Nursing note and vitals reviewed.      Assessment:       1. Annual physical exam    2. Psychosis, unspecified psychosis type    3. Depression, unspecified depression type    4. Screening for colon cancer    5. Status post left mastectomy    6. History of cervical cancer    7. Urinary frequency    8. Lymphedema of left arm    9. Recurrent major depressive disorder, in partial remission    10. Need for vaccination        Plan:   Annual physical exam  Comments:  Follow-up in 2 months    Psychosis, unspecified psychosis type  Comments:  No psychotic features currently.  Has generally done well since her admission for this in September.    Depression, unspecified depression type  Comments:  Mood is good on current medications.  Recently had Remeron added for sleep  Orders:  -     Comprehensive metabolic panel; Future; Expected date: 01/14/2019    Screening for colon cancer  Comments:  Has no one to drive her home.  Fit kit ordered  Orders:  -     Fecal Immunochemical Test (iFOBT); Future; Expected date: 01/14/2019    Status post left  mastectomy  Comments:  Noncancerous lesion    History of cervical cancer  Comments:  History of surgery on the cervix.  No recent Pap smear.  Gyn consult for Pap smear  Orders:  -     Ambulatory consult to Gynecology    Urinary frequency  Comments:  UA negative.  Urine culture ordered.  Will contact me if/when she would like to see Urology.?  Affect of psychotropic medications?  Orders:  -     Urine culture; Future; Expected date: 01/14/2019  -     POCT URINE DIPSTICK WITHOUT MICROSCOPE    Lymphedema of left arm  Comments:  Referral to Oncology ( SADIA Butcher) for this  Orders:  -     Ambulatory consult to Hematology / Oncology    Recurrent major depressive disorder, in partial remission  Comments:  As above    Need for vaccination  Comments:  Pneumovax and flu vaccines today  Orders:  -     (In Office Administered) Pneumococcal Polysaccharide Vaccine (23 Valent) (SQ/IM)    Other orders  -     Influenza - High Dose (65+) (PF) (IM)

## 2019-01-16 LAB — BACTERIA UR CULT: NORMAL

## 2019-01-16 RX ORDER — OLANZAPINE 5 MG/1
TABLET ORAL
Qty: 30 TABLET | Refills: 0 | Status: SHIPPED | OUTPATIENT
Start: 2019-01-16 | End: 2019-02-05 | Stop reason: SDUPTHER

## 2019-01-17 NOTE — PROGRESS NOTES
Patient ID: Jaimee Mccarthy is a 68 y.o. female.    Chief Complaint: Lymphedema left      HPI: Pt is referred by Dr Gibbs for evaluation of left arm edema.     2013 pt had left breast modified radical mastectomy with 15 lymph nodes removed per pt report. Pt states did not have breast cancer- was a mistake that her breast and nodes were removed- went to Dr. Lee Sheets for second opinion- he told her she did not have cancer after he obtained the specimen and sent to [facility].     Pt had reconstruction left breast- TRAM flap.     Pt states over past couple of years has noted her left arm swelling. No redness or infection. No history of infection and no physical therapy. Denies any pain- has tightness feeling. Achy sometimes.     Risk factors identified:     Menarche at 14 y/o  G 3 P 3  First pregnancy at  23 y/o  LMP: 53 y/o  Estrogen: none  Radiation to the neck or chest wall- none  Prior breast biopsies or atypical hyperplasia - left breast     FH: no breast cancer- father lung cancer - 58 y/o, brother liver cancer - 60's, sister kidney cancer- early 60's    Body mass index is 22.99 kg/m².    Review of Systems   Constitutional: Negative.  Negative for activity change and unexpected weight change.   HENT: Negative.  Negative for hearing loss, rhinorrhea and trouble swallowing.    Eyes: Negative.  Negative for discharge and visual disturbance.   Respiratory: Negative.  Negative for chest tightness and wheezing.    Cardiovascular: Negative.  Negative for chest pain and palpitations.   Gastrointestinal: Negative.  Negative for blood in stool, constipation, diarrhea and vomiting.        No reflux   Endocrine: Negative.  Negative for polydipsia and polyuria.   Genitourinary: Negative.  Negative for difficulty urinating, dysuria, hematuria and menstrual problem.   Musculoskeletal: Negative.  Negative for arthralgias, joint swelling and neck pain.        Right arm edema as noted in HPI   Skin: Negative.         Slight  pinkness to the left arm.    Allergic/Immunologic: Negative.    Neurological: Negative.  Negative for weakness and headaches.   Hematological: Negative.  Negative for adenopathy.   Psychiatric/Behavioral: Negative.  Negative for confusion and dysphoric mood.   Breast: Pt denies any breast pain, nipple discharge, or palpable mass. No prior trauma or bruising. Left breast removal with reconstruction- TRAM. Left arm edema.     Current Outpatient Medications   Medication Sig Dispense Refill    busPIRone (BUSPAR) 30 MG Tab TAKE 1 TABLET BY MOUTH TWICE DAILY FOR ANXIETY 60 tablet 0    DULoxetine (CYMBALTA) 30 MG capsule TAKE ONE CAPSULE BY MOUTH DAILY 30 capsule 1    DULoxetine (CYMBALTA) 60 MG capsule Take 1 capsule (60 mg total) by mouth once daily. 30 capsule 2    mirtazapine (REMERON) 7.5 MG Tab Take 1 tablet (7.5 mg total) by mouth every evening. 30 tablet 2    OLANZapine (ZYPREXA) 5 MG tablet TAKE 1 TABLET BY MOUTH DAILY AT BEDTIME. 30 tablet 0     No current facility-administered medications for this visit.        Review of patient's allergies indicates:   Allergen Reactions    Codeine Swelling    Sulfa (sulfonamide antibiotics) Anaphylaxis    Asa-acetaminophen-caff-buffers        Past Medical History:   Diagnosis Date    Cervical cancer        Past Surgical History:   Procedure Laterality Date    MASTECTOMY         Family History   Problem Relation Age of Onset    Cancer Father     Cancer Sister     Cancer Brother        Social History     Socioeconomic History    Marital status: Single     Spouse name: Not on file    Number of children: Not on file    Years of education: Not on file    Highest education level: Not on file   Social Needs    Financial resource strain: Not on file    Food insecurity - worry: Not on file    Food insecurity - inability: Not on file    Transportation needs - medical: Not on file    Transportation needs - non-medical: Not on file   Occupational History    Not on  file   Tobacco Use    Smoking status: Former Smoker    Smokeless tobacco: Never Used   Substance and Sexual Activity    Alcohol use: Not on file    Drug use: Not on file    Sexual activity: Not on file   Other Topics Concern    Not on file   Social History Narrative    Not on file       Vitals:    01/21/19 0737   BP: 126/66   Pulse: 89   Resp: 16       Physical Exam   Constitutional: She is oriented to person, place, and time. She appears well-developed and well-nourished.   HENT:   Head: Normocephalic and atraumatic.   Right Ear: External ear normal.   Left Ear: External ear normal.   Mouth/Throat: No oropharyngeal exudate.   Eyes: Conjunctivae and EOM are normal. Pupils are equal, round, and reactive to light. Right eye exhibits no discharge. Left eye exhibits no discharge. No scleral icterus.   Neck: Normal range of motion. Neck supple. No thyromegaly present.   Cardiovascular: Normal rate, regular rhythm and normal heart sounds.   Pulmonary/Chest: Effort normal and breath sounds normal. Right breast exhibits no inverted nipple, no mass, no nipple discharge, no skin change and no tenderness. Left breast exhibits no inverted nipple, no mass, no nipple discharge, no skin change and no tenderness.       Abdominal: Soft. Bowel sounds are normal.   Musculoskeletal: Normal range of motion. She exhibits no edema.        Right shoulder: She exhibits no crepitus and normal strength.   Lymphadenopathy:        Head (right side): No submental, no submandibular, no tonsillar, no preauricular, no posterior auricular and no occipital adenopathy present.        Head (left side): No submental, no submandibular, no tonsillar, no preauricular, no posterior auricular and no occipital adenopathy present.     She has no cervical adenopathy.        Right cervical: No superficial cervical and no posterior cervical adenopathy present.       Left cervical: No superficial cervical and no posterior cervical adenopathy present.      She has no axillary adenopathy.        Right: No supraclavicular adenopathy present.        Left: No supraclavicular adenopathy present.   Left arm is swollen in comparison to the right. Mainly from mid upper arm to hand. No evidence of infection. No pain with palpation. Has excellent radial and brachial pulse. Slight pink hue from elbow to hand.     ROM is excellent. Left hand strength slightly less than right.    Neurological: She is alert and oriented to person, place, and time. She has normal reflexes.   Skin: Skin is warm and dry. No rash noted. No erythema. No pallor.   Psychiatric: She has a normal mood and affect. Her behavior is normal. Judgment and thought content normal.     MAMMO:   Dr. Sheets orders mammograms-  Think has been done at [facility] and has been one year.       Assessment & Plan:  Postmastectomy lymphedema  -     Ambulatory Referral to Physical Therapy    Breast cancer screening by mammogram  -     Mammo Digital Diagnostic Right; Future; Expected date: 01/21/2019    Encounter for observation for other suspected diseases and conditions ruled out   -     Mammo Digital Diagnostic Right; Future; Expected date: 01/21/2019    Educational circumstance    Counseling regarding goals of care      1. Left arm lymphedema- recommend referral to PT for evaluation and treatment. Offered PT at [facility] now or in [facility] system in a few weeks- pt wants everything to be done in [facility] system if possible. Referral sent and called department to let them know I was sending the pt for lymphedema.   2. History of left mastectomy- pt denies cancer - will schedule pt right mammo - pt asked that her imaging be done in our system as well- pt thinks has been over a year since last mammo.   3. Explained what lymphedema was and how it is treated. Discussed risks for breast cancer and importance of [id number] will be scheduled and will f/u with pt in 6 mons to see how her lymphedema is doing at that time. Pt to call if  notes any changes or exacerbation prior to her f/u appt.

## 2019-01-21 ENCOUNTER — OFFICE VISIT (OUTPATIENT)
Dept: SURGERY | Facility: CLINIC | Age: 69
End: 2019-01-21
Payer: MEDICARE

## 2019-01-21 VITALS
HEIGHT: 65 IN | SYSTOLIC BLOOD PRESSURE: 126 MMHG | OXYGEN SATURATION: 97 % | DIASTOLIC BLOOD PRESSURE: 66 MMHG | RESPIRATION RATE: 16 BRPM | WEIGHT: 136 LBS | HEART RATE: 89 BPM | BODY MASS INDEX: 22.66 KG/M2

## 2019-01-21 DIAGNOSIS — I97.2 POSTMASTECTOMY LYMPHEDEMA: Primary | ICD-10-CM

## 2019-01-21 DIAGNOSIS — Z71.89 COUNSELING REGARDING GOALS OF CARE: ICD-10-CM

## 2019-01-21 DIAGNOSIS — Z55.9 EDUCATIONAL CIRCUMSTANCE: ICD-10-CM

## 2019-01-21 DIAGNOSIS — Z03.89 ENCOUNTER FOR OBSERVATION FOR OTHER SUSPECTED DISEASES AND CONDITIONS RULED OUT: ICD-10-CM

## 2019-01-21 DIAGNOSIS — Z12.31 BREAST CANCER SCREENING BY MAMMOGRAM: ICD-10-CM

## 2019-01-21 PROCEDURE — 99205 OFFICE O/P NEW HI 60 MIN: CPT | Mod: HCNC,S$GLB,, | Performed by: NURSE PRACTITIONER

## 2019-01-21 PROCEDURE — 99999 PR PBB SHADOW E&M-EST. PATIENT-LVL III: ICD-10-PCS | Mod: PBBFAC,HCNC,, | Performed by: NURSE PRACTITIONER

## 2019-01-21 PROCEDURE — 99205 PR OFFICE/OUTPT VISIT, NEW, LEVL V, 60-74 MIN: ICD-10-PCS | Mod: HCNC,S$GLB,, | Performed by: NURSE PRACTITIONER

## 2019-01-21 PROCEDURE — 1101F PR PT FALLS ASSESS DOC 0-1 FALLS W/OUT INJ PAST YR: ICD-10-PCS | Mod: CPTII,HCNC,S$GLB, | Performed by: NURSE PRACTITIONER

## 2019-01-21 PROCEDURE — 99999 PR PBB SHADOW E&M-EST. PATIENT-LVL III: CPT | Mod: PBBFAC,HCNC,, | Performed by: NURSE PRACTITIONER

## 2019-01-21 PROCEDURE — 1101F PT FALLS ASSESS-DOCD LE1/YR: CPT | Mod: CPTII,HCNC,S$GLB, | Performed by: NURSE PRACTITIONER

## 2019-01-21 SDOH — SOCIAL DETERMINANTS OF HEALTH (SDOH): PROBLEMS RELATED TO EDUCATION AND LITERACY, UNSPECIFIED: Z55.9

## 2019-01-21 NOTE — LETTER
January 21, 2019      Mihir Gibbs MD  [address] [facility] [address]           [address] [address]  Phone: [phone]  Fax: [phone]          Patient: Jaimee Mccarthy   MR Number: [id number]   YOB: 1950   Date of Visit: 1/21/2019       Dear Dr. Mihir Gibbs:    Thank you for referring Jaimee Mccarthy to me for evaluation. Attached you will find relevant portions of my assessment and plan of care.    If you have questions, please do not hesitate to call me. I look forward to following Jaimee Mccarthy along with you.    Sincerely,    Judit Butcher NP    Enclosure  CC:  No Recipients    If you would like to receive this communication electronically, please contact [email or web address] or [phone] to request more information on [employer] access.    For providers and/or their staff who would like to refer a patient to [facility], please contact us through our one-stop-shop provider referral line, Xi[facility] Dumont, at [phone].    If you feel you have received this communication in error or would no longer like to receive these types of communications, please e-mail [email or web address]

## 2019-01-27 RX ORDER — BUSPIRONE HYDROCHLORIDE 30 MG/1
TABLET ORAL
Qty: 60 TABLET | Refills: 0 | Status: SHIPPED | OUTPATIENT
Start: 2019-01-27 | End: 2019-02-05 | Stop reason: SDUPTHER

## 2019-01-30 ENCOUNTER — APPOINTMENT (OUTPATIENT)
Dept: LAB | Facility: HOSPITAL | Age: 69
End: 2019-01-30
Attending: FAMILY MEDICINE
Payer: MEDICARE

## 2019-01-30 DIAGNOSIS — Z12.11 SCREENING FOR COLON CANCER: ICD-10-CM

## 2019-01-30 PROCEDURE — 82274 ASSAY TEST FOR BLOOD FECAL: CPT | Mod: HCNC

## 2019-01-31 LAB — HEMOCCULT STL QL IA: NEGATIVE

## 2019-02-01 ENCOUNTER — TELEPHONE (OUTPATIENT)
Dept: OPHTHALMOLOGY | Facility: CLINIC | Age: 69
End: 2019-02-01

## 2019-02-01 NOTE — TELEPHONE ENCOUNTER
----- Message from Cristian Elkins sent at 2/1/2019  3:20 PM CST -----  Contact: Pt.   Pt is calling regarding requesting top schedule appt.Pt is requesting to change appt to another 8:00 appt after the 02/08/2019 . .[phone] (home)         ...Thank You  Tania Elkins

## 2019-02-05 ENCOUNTER — OFFICE VISIT (OUTPATIENT)
Dept: PSYCHIATRY | Facility: CLINIC | Age: 69
End: 2019-02-05
Payer: MEDICARE

## 2019-02-05 VITALS
DIASTOLIC BLOOD PRESSURE: 87 MMHG | BODY MASS INDEX: 23.4 KG/M2 | HEART RATE: 79 BPM | SYSTOLIC BLOOD PRESSURE: 165 MMHG | WEIGHT: 138.44 LBS

## 2019-02-05 DIAGNOSIS — F29 PSYCHOSIS, UNSPECIFIED PSYCHOSIS TYPE: Primary | ICD-10-CM

## 2019-02-05 DIAGNOSIS — F41.9 ANXIETY: ICD-10-CM

## 2019-02-05 PROCEDURE — 1101F PR PT FALLS ASSESS DOC 0-1 FALLS W/OUT INJ PAST YR: ICD-10-PCS | Mod: HCNC,CPTII,S$GLB, | Performed by: PSYCHIATRY & NEUROLOGY

## 2019-02-05 PROCEDURE — 1101F PT FALLS ASSESS-DOCD LE1/YR: CPT | Mod: HCNC,CPTII,S$GLB, | Performed by: PSYCHIATRY & NEUROLOGY

## 2019-02-05 PROCEDURE — 99214 PR OFFICE/OUTPT VISIT, EST, LEVL IV, 30-39 MIN: ICD-10-PCS | Mod: HCNC,S$GLB,, | Performed by: PSYCHIATRY & NEUROLOGY

## 2019-02-05 PROCEDURE — 99999 PR PBB SHADOW E&M-EST. PATIENT-LVL II: CPT | Mod: PBBFAC,HCNC,, | Performed by: PSYCHIATRY & NEUROLOGY

## 2019-02-05 PROCEDURE — 99214 OFFICE O/P EST MOD 30 MIN: CPT | Mod: HCNC,S$GLB,, | Performed by: PSYCHIATRY & NEUROLOGY

## 2019-02-05 PROCEDURE — 99999 PR PBB SHADOW E&M-EST. PATIENT-LVL II: ICD-10-PCS | Mod: PBBFAC,HCNC,, | Performed by: PSYCHIATRY & NEUROLOGY

## 2019-02-05 RX ORDER — MIRTAZAPINE 7.5 MG/1
TABLET, FILM COATED ORAL
Qty: 60 TABLET | Refills: 2 | Status: SHIPPED | OUTPATIENT
Start: 2019-02-05 | End: 2019-05-01 | Stop reason: SDUPTHER

## 2019-02-05 RX ORDER — OLANZAPINE 5 MG/1
5 TABLET ORAL NIGHTLY
Qty: 30 TABLET | Refills: 2 | Status: SHIPPED | OUTPATIENT
Start: 2019-02-05 | End: 2019-05-01 | Stop reason: SDUPTHER

## 2019-02-05 RX ORDER — DULOXETIN HYDROCHLORIDE 30 MG/1
30 CAPSULE, DELAYED RELEASE ORAL DAILY
Qty: 30 CAPSULE | Refills: 2 | Status: SHIPPED | OUTPATIENT
Start: 2019-02-05 | End: 2019-05-01 | Stop reason: SDUPTHER

## 2019-02-05 RX ORDER — DULOXETIN HYDROCHLORIDE 60 MG/1
60 CAPSULE, DELAYED RELEASE ORAL DAILY
Qty: 30 CAPSULE | Refills: 2 | Status: SHIPPED | OUTPATIENT
Start: 2019-02-05 | End: 2019-05-01 | Stop reason: SDUPTHER

## 2019-02-05 RX ORDER — BUSPIRONE HYDROCHLORIDE 30 MG/1
TABLET ORAL
Qty: 60 TABLET | Refills: 2 | Status: SHIPPED | OUTPATIENT
Start: 2019-02-05 | End: 2019-05-01 | Stop reason: SDUPTHER

## 2019-02-05 NOTE — PROGRESS NOTES
"Outpatient Psychiatry Follow-up Visit (MD/NP)    2/5/2019    Jaimee Mccarthy, a 68 y.o. female, presenting for follow-up visit. Met with patient.    Reason for Encounter: follow-up, paranoia    Interval Hx: Pt seen and interviewed for follow-up. Reports that she's started working last week. Working about 40 hours/week. In [employer]. Hired about 1 week ago. Doing well so far. Hard to sleep at night despite medications - middle insomnia. Before starting to work was sleeping ok with meds. Still seeing her friend regularly. Blood pressure up.     background: This 68 y/o F presents to [facility], previously a patient of Dr. Degroot(sp?) who she last saw about 3-4 months ago, but who she says disclosed protected health information to a family member so she is seeking new psychiatrist. Reports current medication regimen includes Cymbalta 90 mg (60 mg +30 mg) every morning, & doxepin 100 mg daily at bedtime. Says this regimen is working well. On 8.9.17 - had ER visit by [facility] after EMS called. EMS noted paranoia - cited that she'd ripped shingles off roof, told police she thought someone had been in house several days before after calling them due to things moved in her home, heard something on her roof & when she checked found vents weren't sealed, "strange wiring", called police. ED physician exam documented that she didn't seem to be hallucinating, delusional or thought-disordered and she was discharged. No SI/HI. No AVH. PsychHx: as above; "Depression with anxiety" - office-based outpt treatment, inpatient treatment - x2-3 (for changing sleep medication in context of highly distressing insomnia); Series of previous antidepressants - reports having when started Cymbalta 90 mg daily, helped moods far more than prior treatments ("when I started it I thought I was reborn"). Doxepin 100 mg qhs. Partially helpful for Insomnia; Two prior psychiatrist, previously Dr. Degroot(sp?) Treating her with same medications. " "Another at [facility], threatened to turn him in for medicaid fraud. 8.9.17 - ER visit by [facility] after EMS called. EMS noted paranoia - cited that she'd ripped shingles off roof, told police she thought someone had been in house several days before after calling them due to things moved in her home, heard something on her roof & when she checked found vents weren't sealed, "strange wiring", called police. No found to be psychotic on ED assessment. No other PEC's though had visit from a state agency in '16 due to concern by an unknown neighbor for "delusional", "acting out at her apartment" (report didn't describe her specific acting out behavior).  had  come out for a wellness check - "someone in the complex said I wasn't taking care of myself", said my "apartment was a wreck".  found her apartment in order, didn't take her in for care. Denies conflicts with others or reasons someone might make a bad shara report. Has been out of that complex x 5-6 months. One remote episode of passive suicidality in '13 when went in for lumpectomy - ended up with mastectomy, + lymph node dissection then learned pathology showed no cancer. FamHx: sister with serious mental illness, son dyslexia. MedHx: Rib Removed. Breast CA dx for which she had mastectomy - reports pathology later indicated she didn't have CA. Cervical CA (s/p cone) - History of cervical cancer many years ago: Basal and squamous cell CA (nares). Hyperlipidemia. Recurrent urinary tract infections. SocialHx: born in [facility], raised in . 3 sibs growing up; much older (18 and 20 years). lives in subsidized disability housing; sister institutionalized. Lived with patient's parents as an adult. On/off meds. Cycle of hospitalizations. Functioned ok on meds, not off. Doesn't know diagnoses. Normal socially & academically. 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 " " x 1 child. Was single mother. Lives in [place].  x 2 (,  36 years). Supportive people "all " - both parents and siblings . Trying to get moved in to house. Trying to volunteer with the food bank. Wants to join [employer].   Social security - recognized mental health disability.    Takes medication every day.     Review Of Systems:     GENERAL:  No weight gain or loss  SKIN:  No rashes or lacerations  HEAD:  No headaches  CHEST:  No shortness of breath, hyperventilation or cough  CARDIOVASCULAR:  No tachycardia or chest pain  ABDOMEN:  No nausea, vomiting, pain, constipation or diarrhea  URINARY:  No frequency, dysuria or sexual dysfunction  ENDOCRINE:  No polydipsia, polyuria  MUSCULOSKELETAL:  No pain or stiffness of the joints  NEUROLOGIC:  No weakness, sensory changes, seizures, confusion, memory loss, tremor or other abnormal movements    Current Evaluation:     Nutritional Screening: Considering the patient's height and weight, medications, medical history and preferences, should a referral be made to the dietitian? no    Constitutional  Vitals:  Most recent vital signs, dated less than 90 days prior to this appointment, were not reviewed.  There were no vitals filed for this visit.     General:  unremarkable, age appropriate     Musculoskeletal  Muscle Strength/Tone:  no tremor, no tic   Gait & Station:  non-ataxic     Psychiatric  Appearance: casually dressed & groomed;   Behavior: calm,   Cooperation: cooperative with assessment  Speech: normal rate, volume, tone  Thought Process: linear, goal-directed  Thought Content: No suicidal or homicidal ideation; no delusions  Affect: mildly anxious  Mood: mildly anxious  Perceptions: No auditory or visual hallucinations  Level of Consciousness: alert throughout interview  Insight: fair  Cognition: Oriented to person, place, time, & situation  Memory: no apparent deficits to general clinical interview; not formally " assessed  Attention/Concentration: no apparent deficits to general clinical interview; not formally assessed  Fund of Knowledge: average by vocabulary/education    Laboratory Data  Lab Visit on 01/30/2019   Component Date Value Ref Range Status    Sodium 01/30/2019 143  136 - 145 mmol/L Final    Potassium 01/30/2019 4.3  3.5 - 5.1 mmol/L Final    Chloride 01/30/2019 108  95 - 110 mmol/L Final    CO2 01/30/2019 26  23 - 29 mmol/L Final    Glucose 01/30/2019 111* 70 - 110 mg/dL Final    BUN, Bld 01/30/2019 17  8 - 23 mg/dL Final    Creatinine 01/30/2019 0.7  0.5 - 1.4 mg/dL Final    Calcium 01/30/2019 9.7  8.7 - 10.5 mg/dL Final    Total Protein 01/30/2019 7.2  6.0 - 8.4 g/dL Final    Albumin 01/30/2019 3.9  3.5 - 5.2 g/dL Final    Total Bilirubin 01/30/2019 0.6  0.1 - 1.0 mg/dL Final    Alkaline Phosphatase 01/30/2019 89  55 - 135 U/L Final    AST 01/30/2019 26  10 - 40 U/L Final    ALT 01/30/2019 26  10 - 44 U/L Final    Anion Gap 01/30/2019 9  8 - 16 mmol/L Final    eGFR if African American 01/30/2019 >60.0  >60 mL/min/1.73 m^2 Final    eGFR if non African American 01/30/2019 >60.0  >60 mL/min/1.73 m^2 Final   Lab Visit on 01/30/2019   Component Date Value Ref Range Status    Fecal Immunochemical Test (iFOBT) 01/30/2019 Negative  Negative Final   Office Visit on 01/14/2019   Component Date Value Ref Range Status    Color, UA 01/14/2019 Straw   Final    Spec Grav UA 01/14/2019 1.020   Final    pH, UA 01/14/2019 5   Final    WBC, UA 01/14/2019 Negative   Final    Nitrite, UA 01/14/2019 Negative   Final    Protein 01/14/2019 Trace   Final    Glucose, UA 01/14/2019 Normal   Final    Ketones, UA 01/14/2019 Negative   Final    Urobilinogen, UA 01/14/2019 Normal   Final    Bilirubin 01/14/2019 Negative   Final    Blood, UA 01/14/2019 About 5-10   Final    Urine Culture, Routine 01/14/2019 No significant growth   Final     Medications  Outpatient Encounter Medications as of 2/5/2019  "  Medication Sig Dispense Refill    busPIRone (BUSPAR) 30 MG Tab TAKE 1 TABLET BY MOUTH TWICE DAILY FOR ANXIETY 60 tablet 0    DULoxetine (CYMBALTA) 30 MG capsule TAKE ONE CAPSULE BY MOUTH DAILY 30 capsule 1    DULoxetine (CYMBALTA) 60 MG capsule Take 1 capsule (60 mg total) by mouth once daily. 30 capsule 2    mirtazapine (REMERON) 7.5 MG Tab Take 1 tablet (7.5 mg total) by mouth every evening. 30 tablet 2    OLANZapine (ZYPREXA) 5 MG tablet TAKE 1 TABLET BY MOUTH DAILY AT BEDTIME. 30 tablet 0     No facility-administered encounter medications on file as of 2/5/2019.      Assessment - Diagnosis - Goals:     Impression: 67 y/o F with hx of chronic, seeming intermittent paranoia, possibly hallucinations as well. Poor insight into nature of symptoms. Self-identifies her mental health problems as with anxiety, depression for years. Has had a PEC for concerns for paranoia and near-PEC for alleged neglect, hospitalization in 2018 for psychosis. Hasn't tolerated and self-discontinued 2 antipsychotics, but tolerates olanazpine ok. Free-floating anxiety unrelated to paranoia continues to be a problem. No psychosis recurrence since hospitalization, on antipsychotic.     Dx: psychosis. insomnia, anxiety,    Treatment Goals:  Specify outcomes written in observable, behavioral terms:   Reduce paranoia. Control depression and anxiety symptoms by self-report    Treatment Plan/Recommendations:   · Continue mirtazapine, olanzapine, duloxetine for pain and mood. add buspirone for anxiety.Observe for paranoia, other symptoms, stop duloxetine if symptoms return. Ms. Solaresgs to serve as "reality mentor", contact us should she notice Ms. Mccarthy paranoia returning/worsening.   · Discussed risks, benefits, and alternatives to treatment plan documented above with patient. I answered all patient questions related to this plan and patient expressed understanding and agreement.     Return to Clinic: 2 months    Counseling time: 10 " minutes  Total time: 25 minutes    VIPIN Chavez MD

## 2019-02-06 ENCOUNTER — TELEPHONE (OUTPATIENT)
Dept: FAMILY MEDICINE | Facility: CLINIC | Age: 69
End: 2019-02-06

## 2019-02-06 ENCOUNTER — OFFICE VISIT (OUTPATIENT)
Dept: OBSTETRICS AND GYNECOLOGY | Facility: CLINIC | Age: 69
End: 2019-02-06
Payer: MEDICARE

## 2019-02-06 VITALS
WEIGHT: 137.56 LBS | BODY MASS INDEX: 23.49 KG/M2 | HEIGHT: 64 IN | SYSTOLIC BLOOD PRESSURE: 116 MMHG | DIASTOLIC BLOOD PRESSURE: 60 MMHG

## 2019-02-06 DIAGNOSIS — Z01.419 WELL WOMAN EXAM WITH ROUTINE GYNECOLOGICAL EXAM: Primary | ICD-10-CM

## 2019-02-06 DIAGNOSIS — N95.9 MENOPAUSAL AND PERIMENOPAUSAL DISORDER: ICD-10-CM

## 2019-02-06 DIAGNOSIS — Z13.820 OSTEOPOROSIS SCREENING: ICD-10-CM

## 2019-02-06 DIAGNOSIS — Z85.41 HISTORY OF CERVICAL CANCER: ICD-10-CM

## 2019-02-06 PROCEDURE — 99999 PR PBB SHADOW E&M-EST. PATIENT-LVL IV: CPT | Mod: PBBFAC,HCNC,, | Performed by: OBSTETRICS & GYNECOLOGY

## 2019-02-06 PROCEDURE — 99999 PR PBB SHADOW E&M-EST. PATIENT-LVL IV: ICD-10-PCS | Mod: PBBFAC,HCNC,, | Performed by: OBSTETRICS & GYNECOLOGY

## 2019-02-06 PROCEDURE — G0101 PR CA SCREEN;PELVIC/BREAST EXAM: ICD-10-PCS | Mod: HCNC,S$GLB,, | Performed by: OBSTETRICS & GYNECOLOGY

## 2019-02-06 PROCEDURE — G0101 CA SCREEN;PELVIC/BREAST EXAM: HCPCS | Mod: HCNC,S$GLB,, | Performed by: OBSTETRICS & GYNECOLOGY

## 2019-02-06 PROCEDURE — 87624 HPV HI-RISK TYP POOLED RSLT: CPT | Mod: HCNC

## 2019-02-06 PROCEDURE — 88175 CYTOPATH C/V AUTO FLUID REDO: CPT | Mod: HCNC

## 2019-02-06 RX ORDER — MIRTAZAPINE 15 MG/1
TABLET, FILM COATED ORAL
COMMUNITY
Start: 2019-01-16 | End: 2019-05-01 | Stop reason: DRUGHIGH

## 2019-02-06 NOTE — PROGRESS NOTES
Subjective:       Patient ID: Jaimee Mccarthy is a 68 y.o. female.    Chief Complaint:  Well Woman      History of Present Illness  HPI  Presents for a well-woman exam.  No gyn complaints.  She is very nervous about gyn exams.  Pt is , not on HRT.  Not sexually active.   Last pap: several years ago; has a hx of cervical cancer treated with a CKC in .  Had close follow-up with pap smears for several years, and all since then have been normal.  Can't recall when last pap was.  Has an upcoming MMG scheduled.  The patient had an abnormal MMG with left breast biopsy that showed breast cancer.  She then underwent left mastectomy and LND, and now has LUE lymphedema.  Final pathology showed no cancer.  She got a second opinion, and was told that she never actually had breast cancer.  Colon cancer screening: did home test through her PCP's office 2 weeks ago, and it was negative  DXA: several years ago; due now     GYN & OB History  No LMP recorded. Patient is postmenopausal.   Date of Last Pap: No result found    OB History    Para Term  AB Living   3 3 1 2 0 1   SAB TAB Ectopic Multiple Live Births   0 0 0 0 3      # Outcome Date GA Lbr Az/2nd Weight Sex Delivery Anes PTL Lv   3          ND   2          ND   1 Term     M Vag-Spont   LIBERTAD          Review of Systems  Review of Systems   Constitutional: Negative for fatigue, fever and unexpected weight change.   Gastrointestinal: Negative for abdominal pain, bloating, blood in stool, constipation, diarrhea, nausea and vomiting.   Endocrine: Negative for hot flashes.   Genitourinary: Negative for dysuria, flank pain, frequency, genital sores, hematuria, pelvic pain, urgency, vaginal bleeding, vaginal discharge, vaginal pain, urinary incontinence, postmenopausal bleeding and vaginal odor.   Psychiatric/Behavioral: Negative for depression. The patient is not nervous/anxious.    Breast: Negative for mass, mastodynia, nipple discharge and skin  changes          Objective:    Physical Exam:   Constitutional: She is oriented to person, place, and time. She appears well-developed and well-nourished. No distress.        Pulmonary/Chest: Right breast exhibits no inverted nipple, no mass, no nipple discharge, no skin change, no tenderness, no bleeding and no swelling. Left breast exhibits absence.        Abdominal: Soft. She exhibits no distension and no mass. There is no tenderness. There is no rebound and no guarding. Hernia confirmed negative in the right inguinal area and confirmed negative in the left inguinal area.     Genitourinary: Vagina normal. Pelvic exam was performed with patient supine. There is no rash, tenderness, lesion or injury on the right labia. There is no rash, tenderness, lesion or injury on the left labia. Uterus is not deviated, not enlarged, not fixed, not tender and not experiencing uterine prolapse. Right adnexum displays no mass, no tenderness and no fullness. Left adnexum displays no mass, no tenderness and no fullness. No erythema (mucosa pale and atrophic), tenderness, bleeding, rectocele, cystocele or unspecified prolapse of vaginal walls in the vagina. No foreign body in the vagina. No signs of injury around the vagina. No vaginal discharge found. Cervix exhibits no motion tenderness, no discharge and no friability. Additional cervical findings: pap smear done       Uterus Size: 6 cm      Lymphadenopathy:     She has no axillary adenopathy.        Right axillary: No pectoral and no lateral adenopathy present.        Left axillary: No pectoral and no lateral adenopathy present.   Neurological: She is alert and oriented to person, place, and time.     Psychiatric: She has a normal mood and affect.          Assessment:        1. Well woman exam with routine gynecological exam    2. History of cervical cancer    3. Osteoporosis screening    4. Menopausal and perimenopausal disorder                 Plan:      Jaimee was seen today  for well woman.    Diagnoses and all orders for this visit:    Well woman exam with routine gynecological exam    History of cervical cancer  -     Liquid-based pap smear, screening  -     HPV High Risk Genotypes, PCR    Osteoporosis screening  -     DXA Bone Density Spine And Hip; Future    Menopausal and perimenopausal disorder   -     DXA Bone Density Spine And Hip; Future    If today's pap and HPV co-tests are negative, then needs another co-test in 3-5 years.  If both are negative, then okay to discontinue pap smears, especially since her treatment was >20 years ago and all pap smears normal since.  RTC 1 year or sooner prn.

## 2019-02-06 NOTE — TELEPHONE ENCOUNTER
Spoke to pt and told her she would need to come in and be seen for the swelling. Pt said she been to dr after dr today and dont feel like coming. I advised pt if swelling still there to come to urgent care.

## 2019-02-06 NOTE — LETTER
February 6, 2019      Mihir Gibbs MD  [address] [place] [id number]           [facility]  [address] [address]  Phone: [phone]  Fax: [phone]          Patient: Jaimee Mccarthy   MR Number: [id number]   YOB: 1950   Date of Visit: 2/6/2019       Dear Dr. Mihir iGbbs:    Thank you for referring Jaimee Mccarthy to me for evaluation. Attached you will find relevant portions of my assessment and plan of care.    If you have questions, please do not hesitate to call me. I look forward to following Jaimee Mccarthy along with you.    Sincerely,    Jany Rayo MD    Enclosure  CC:  No Recipients    If you would like to receive this communication electronically, please contact [email or web address] or [phone] to request more information on [employer] access.    For providers and/or their staff who would like to refer a patient to [facility], please contact us through our one-stop-shop provider referral line, [facility] , at [phone].    If you feel you have received this communication in error or would no longer like to receive these types of communications, please e-mail [email or web address]

## 2019-02-06 NOTE — TELEPHONE ENCOUNTER
----- Message from Mihir Gibbs MD sent at 2/6/2019  3:52 PM CST -----  Contact: self  Needs to be seen  ----- Message -----  From: Sulma Finney MA  Sent: 2/6/2019   2:26 PM  To: Mihir Gibbs MD    Just spoke to pt and pt stated that her legs and arm are swelling and wants to know what she should do? I asked her if she wanted to come in but she wanted me to ask you first  ----- Message -----  From: Al Hutchinson  Sent: 2/6/2019   2:15 PM  To: Bernie Ash Staff    Returning phone call. Please call back at [phone].    Thanks,  Al Hutchinson

## 2019-02-06 NOTE — TELEPHONE ENCOUNTER
----- Message from Caitlin Johnson sent at 2/6/2019 12:12 PM CST -----  Contact: pt  .Type:  Needs Medical Advice    Who Called: Pt  Symptoms (please be specific): pt having issues with Fluid issues   How long has patient had these symptoms:  1wk  Pharmacy name and phone #:  [facility]  Would the patient rather a call back or a response via [facility]? Call back  Best Call Back Number: [phone]  Additional Information: pt need to speak with Sulma,pt states her leg/feet swollen bad

## 2019-02-11 LAB
HPV HR 12 DNA CVX QL NAA+PROBE: NEGATIVE
HPV16 AG SPEC QL: NEGATIVE
HPV18 DNA SPEC QL NAA+PROBE: NEGATIVE

## 2019-02-19 ENCOUNTER — OFFICE VISIT (OUTPATIENT)
Dept: OPHTHALMOLOGY | Facility: CLINIC | Age: 69
End: 2019-02-19
Payer: MEDICARE

## 2019-02-19 DIAGNOSIS — Z46.0 ENCOUNTER FOR FITTING OR ADJUSTMENT OF SPECTACLES OR CONTACT LENSES: Primary | ICD-10-CM

## 2019-02-19 PROCEDURE — 99499 UNLISTED E&M SERVICE: CPT | Mod: HCNC,S$GLB,, | Performed by: OPTOMETRIST

## 2019-02-19 PROCEDURE — 99499 NO LOS: ICD-10-PCS | Mod: HCNC,S$GLB,, | Performed by: OPTOMETRIST

## 2019-02-19 PROCEDURE — 92310 PR CONTACT LENS FITTING (NO CHANGE): ICD-10-PCS | Mod: ,,, | Performed by: OPTOMETRIST

## 2019-02-19 PROCEDURE — 92015 PR REFRACTION: ICD-10-PCS | Mod: HCNC,S$GLB,, | Performed by: OPTOMETRIST

## 2019-02-19 PROCEDURE — 92310 CONTACT LENS FITTING OU: CPT | Mod: ,,, | Performed by: OPTOMETRIST

## 2019-02-19 PROCEDURE — 99999 PR PBB SHADOW E&M-EST. PATIENT-LVL I: CPT | Mod: PBBFAC,HCNC,, | Performed by: OPTOMETRIST

## 2019-02-19 PROCEDURE — 92015 DETERMINE REFRACTIVE STATE: CPT | Mod: HCNC,S$GLB,, | Performed by: OPTOMETRIST

## 2019-02-19 PROCEDURE — 99999 PR PBB SHADOW E&M-EST. PATIENT-LVL I: ICD-10-PCS | Mod: PBBFAC,HCNC,, | Performed by: OPTOMETRIST

## 2019-02-19 NOTE — PROGRESS NOTES
HPI     NP Exam/CTL fit  Severe Blurred va states she had RK surgery 20 years ago with 40 cuts per   eyes pt using +2.50 readers to drive night va is terrible with extreme   night burst   Pt states she really will like to try contacts before glasses states she   worn contacts all her life both hard and soft     Last edited by Apryl Krause MA on 2/19/2019  9:13 AM. (History)            Assessment /Plan     For exam results, see Encounter Report.    Encounter for fitting or adjustment of spectacles or contact lenses      Mild cataracts OU    S/P RK/AK with 5 revisions @ Dr Gomez in [place].    Prefers not to go to [facility] for scleral lens fit.    Will try soft contact lens fit today for distance only, will need readers for near work.    Dispense Final Rx for glasses.  RTC 2 weeks for contact lens follow up and dilated fundus exam.  Discussed above and answered questions.

## 2019-02-25 ENCOUNTER — HOSPITAL ENCOUNTER (OUTPATIENT)
Dept: RADIOLOGY | Facility: HOSPITAL | Age: 69
Discharge: HOME | End: 2019-02-25
Attending: NURSE PRACTITIONER
Payer: MEDICARE

## 2019-02-25 DIAGNOSIS — Z03.89 ENCOUNTER FOR OBSERVATION FOR OTHER SUSPECTED DISEASES AND CONDITIONS RULED OUT: ICD-10-CM

## 2019-02-25 DIAGNOSIS — Z12.31 BREAST CANCER SCREENING BY MAMMOGRAM: ICD-10-CM

## 2019-02-25 PROCEDURE — 77063 MAMMO DIGITAL SCREENING RIGHT WITH TOMOSYNTHESIS_CAD: ICD-10-PCS | Mod: 26,52,HCNC, | Performed by: RADIOLOGY

## 2019-02-25 PROCEDURE — 77063 BREAST TOMOSYNTHESIS BI: CPT | Mod: 26,52,HCNC, | Performed by: RADIOLOGY

## 2019-02-25 PROCEDURE — 77067 SCR MAMMO BI INCL CAD: CPT | Mod: 26,52,HCNC, | Performed by: RADIOLOGY

## 2019-02-25 PROCEDURE — 77067 MAMMO DIGITAL SCREENING RIGHT WITH TOMOSYNTHESIS_CAD: ICD-10-PCS | Mod: 26,52,HCNC, | Performed by: RADIOLOGY

## 2019-02-25 PROCEDURE — 77067 SCR MAMMO BI INCL CAD: CPT | Mod: TC,HCNC

## 2019-03-05 ENCOUNTER — OFFICE VISIT (OUTPATIENT)
Dept: OPHTHALMOLOGY | Facility: CLINIC | Age: 69
End: 2019-03-05
Payer: MEDICARE

## 2019-03-05 DIAGNOSIS — H52.03 HYPEROPIA, BILATERAL: ICD-10-CM

## 2019-03-05 DIAGNOSIS — Z98.890 HISTORY OF REFRACTIVE SURGERY: ICD-10-CM

## 2019-03-05 DIAGNOSIS — H52.4 BILATERAL PRESBYOPIA: ICD-10-CM

## 2019-03-05 DIAGNOSIS — H25.13 CATARACT, NUCLEAR SCLEROTIC SENILE, BILATERAL: Primary | ICD-10-CM

## 2019-03-05 PROCEDURE — 99999 PR PBB SHADOW E&M-EST. PATIENT-LVL II: ICD-10-PCS | Mod: PBBFAC,HCNC,, | Performed by: OPTOMETRIST

## 2019-03-05 PROCEDURE — 92015 DETERMINE REFRACTIVE STATE: CPT | Mod: HCNC,S$GLB,, | Performed by: OPTOMETRIST

## 2019-03-05 PROCEDURE — 92015 PR REFRACTION: ICD-10-PCS | Mod: HCNC,S$GLB,, | Performed by: OPTOMETRIST

## 2019-03-05 PROCEDURE — 99999 PR PBB SHADOW E&M-EST. PATIENT-LVL II: CPT | Mod: PBBFAC,HCNC,, | Performed by: OPTOMETRIST

## 2019-03-05 PROCEDURE — 92014 COMPRE OPH EXAM EST PT 1/>: CPT | Mod: HCNC,S$GLB,, | Performed by: OPTOMETRIST

## 2019-03-05 PROCEDURE — 92014 PR EYE EXAM, EST PATIENT,COMPREHESV: ICD-10-PCS | Mod: HCNC,S$GLB,, | Performed by: OPTOMETRIST

## 2019-03-05 NOTE — PROGRESS NOTES
HPI     Last TRF exam 02/19/2019  CL follow up  Patient is not wearing lenses today right lens was scratchy   Giving patient a fresh pair to put in this morning   Air Optix HydraGlyde   OD +2.00 8.6/14.2  OS +2.25 8.6/14.2    Last edited by Modesto Valdovinos MA on 3/5/2019  9:05 AM. (History)            Assessment /Plan     For exam results, see Encounter Report.    Cataract, nuclear sclerotic senile, bilateral    History of refractive surgery    Hyperopia, bilateral    Bilateral presbyopia      Mild cataracts OU, not surgical.    Stable RK, doing well with soft contact lenses.    Discussed CL charges.  Dispense Final Rx for glasses  No changes CL Rx  RTC 1 year  Discussed above and answered questions.

## 2019-04-15 ENCOUNTER — OFFICE VISIT (OUTPATIENT)
Dept: FAMILY MEDICINE | Facility: CLINIC | Age: 69
End: 2019-04-15
Payer: MEDICARE

## 2019-04-15 VITALS
DIASTOLIC BLOOD PRESSURE: 67 MMHG | SYSTOLIC BLOOD PRESSURE: 128 MMHG | BODY MASS INDEX: 24.54 KG/M2 | WEIGHT: 143.75 LBS | OXYGEN SATURATION: 96 % | HEIGHT: 64 IN | TEMPERATURE: 98 F | HEART RATE: 93 BPM

## 2019-04-15 DIAGNOSIS — G47.00 INSOMNIA, UNSPECIFIED TYPE: ICD-10-CM

## 2019-04-15 DIAGNOSIS — E78.5 HYPERLIPIDEMIA, UNSPECIFIED HYPERLIPIDEMIA TYPE: Primary | ICD-10-CM

## 2019-04-15 DIAGNOSIS — R60.9 EDEMA, UNSPECIFIED TYPE: ICD-10-CM

## 2019-04-15 DIAGNOSIS — F29 PSYCHOSIS, UNSPECIFIED PSYCHOSIS TYPE: ICD-10-CM

## 2019-04-15 DIAGNOSIS — I89.0 LYMPHEDEMA OF LEFT ARM: ICD-10-CM

## 2019-04-15 DIAGNOSIS — Z79.899 LONG-TERM USE OF HIGH-RISK MEDICATION: ICD-10-CM

## 2019-04-15 DIAGNOSIS — F41.9 ANXIETY: ICD-10-CM

## 2019-04-15 PROCEDURE — 1101F PR PT FALLS ASSESS DOC 0-1 FALLS W/OUT INJ PAST YR: ICD-10-PCS | Mod: HCNC,CPTII,S$GLB, | Performed by: FAMILY MEDICINE

## 2019-04-15 PROCEDURE — 99999 PR PBB SHADOW E&M-EST. PATIENT-LVL III: CPT | Mod: PBBFAC,HCNC,, | Performed by: FAMILY MEDICINE

## 2019-04-15 PROCEDURE — 1101F PT FALLS ASSESS-DOCD LE1/YR: CPT | Mod: HCNC,CPTII,S$GLB, | Performed by: FAMILY MEDICINE

## 2019-04-15 PROCEDURE — 99214 OFFICE O/P EST MOD 30 MIN: CPT | Mod: HCNC,S$GLB,, | Performed by: FAMILY MEDICINE

## 2019-04-15 PROCEDURE — 99999 PR PBB SHADOW E&M-EST. PATIENT-LVL III: ICD-10-PCS | Mod: PBBFAC,HCNC,, | Performed by: FAMILY MEDICINE

## 2019-04-15 PROCEDURE — 99214 PR OFFICE/OUTPT VISIT, EST, LEVL IV, 30-39 MIN: ICD-10-PCS | Mod: HCNC,S$GLB,, | Performed by: FAMILY MEDICINE

## 2019-04-15 RX ORDER — FUROSEMIDE 20 MG/1
20 TABLET ORAL
Qty: 20 TABLET | Refills: 1 | Status: SHIPPED | OUTPATIENT
Start: 2019-04-15 | End: 2020-07-17

## 2019-04-15 NOTE — PROGRESS NOTES
Subjective:       Patient ID: Jaimee Mccarthy is a 68 y.o. female.    Chief Complaint: Follow-up      HPI Comments:       Current Outpatient Medications:     busPIRone (BUSPAR) 30 MG Tab, TAKE 1 TABLET BY MOUTH TWICE DAILY FOR ANXIETY, Disp: 60 tablet, Rfl: 2    DULoxetine (CYMBALTA) 30 MG capsule, Take 1 capsule (30 mg total) by mouth once daily., Disp: 30 capsule, Rfl: 2    DULoxetine (CYMBALTA) 60 MG capsule, Take 1 capsule (60 mg total) by mouth once daily., Disp: 30 capsule, Rfl: 2    mirtazapine (REMERON) 15 MG tablet, , Disp: , Rfl:     mirtazapine (REMERON) 7.5 MG Tab, Take 1 to 2 tablets at bedtime as needed for sleep., Disp: 60 tablet, Rfl: 2    OLANZapine (ZYPREXA) 5 MG tablet, Take 1 tablet (5 mg total) by mouth nightly., Disp: 30 tablet, Rfl: 2    furosemide (LASIX) 20 MG tablet, Take 1 tablet (20 mg total) by mouth as needed., Disp: 20 tablet, Rfl: 1      Routine follow-up.  Has been seen by gyn, Oncology, Psychiatry since our last visit 3 months ago.  He had a diagnostic mammogram that was normal.  Her Pap smear was normal.  Her fit kit was normal.  Today she asked about the [facility] department in whether not they have a lymphedema clinic yet.  There was close to set 1 up so that she could go to a.  His notes from them suggest they were trying to get her into physical therapy.  She did not do that and was waiting for the clinic the open area today give her contact information find out more about.  She has been seen by Psychiatry and is on multiple psychiatric medications now including BuSpar, Cymbalta, Remeron, Zyprexa..  She says it is working well but still has a lot of trouble with chronic insomnia.  Follow up with them on 05/01.    Plans of periods of swelling and weight gain.  Says they occur together.  Can make a thumb print into her legs.  Weight goes up 5-10 lb.  Then in all goes away for awhile.     Review of Systems   Constitutional: Negative for activity change, appetite change and fever.  "  HENT: Negative for sore throat.    Respiratory: Negative for cough and shortness of breath.    Cardiovascular: Positive for leg swelling. Negative for chest pain.   Gastrointestinal: Negative for abdominal pain, diarrhea and nausea.   Genitourinary: Negative for difficulty urinating.   Musculoskeletal: Negative for arthralgias and myalgias.   Neurological: Negative for dizziness and headaches.   Psychiatric/Behavioral: Positive for sleep disturbance.       Objective:      Vitals:    04/15/19 1048   BP: 128/67   Pulse: 93   Temp: 97.7 °F (36.5 °C)   SpO2: 96%   Weight: 65.2 kg (143 lb 11.8 oz)   Height: 5' 4" (1.626 m)   PainSc: 0-No pain     Physical Exam   Constitutional: She is oriented to person, place, and time. She appears well-developed and well-nourished. No distress.   HENT:   Head: Normocephalic.   Neck: Neck supple. No thyromegaly present.   Cardiovascular: Normal rate, regular rhythm and normal heart sounds.   No murmur heard.  Pulmonary/Chest: Effort normal and breath sounds normal. She has no wheezes. She has no rales.   Abdominal: Soft. She exhibits no distension.   Musculoskeletal: She exhibits no edema.   No edema currently in lower extremities   Lymphadenopathy:     She has no cervical adenopathy.   Neurological: She is alert and oriented to person, place, and time.   Skin: Skin is warm and dry. She is not diaphoretic.   Psychiatric: She has a normal mood and affect. Her behavior is normal. Judgment and thought content normal.   Nursing note and vitals reviewed.      Assessment:       1. Hyperlipidemia, unspecified hyperlipidemia type    2. Long-term use of high-risk medication    3. Insomnia, unspecified type    4. Anxiety    5. Psychosis, unspecified psychosis type    6. Edema, unspecified type    7. Lymphedema of left arm        Plan:   Hyperlipidemia, unspecified hyperlipidemia type  Comments:  Currently not treated.  Does not tolerate statin medications.  ; HDL 31    Long-term use of " high-risk medication    Insomnia, unspecified type    Anxiety  Comments:  With paranoia.  Meds per psychiatry    Psychosis, unspecified psychosis type  Comments:  Currently on anti psychotic    Edema, unspecified type  Comments:  Episodic.  May be influenced by psych medications.  Lasix 20 mg as needed for short periods of time    Lymphedema of left arm  Comments:  Contact information for lymphedema provider given    Other orders  -     furosemide (LASIX) 20 MG tablet; Take 1 tablet (20 mg total) by mouth as needed.  Dispense: 20 tablet; Refill: 1

## 2019-05-01 ENCOUNTER — OFFICE VISIT (OUTPATIENT)
Dept: PSYCHIATRY | Facility: CLINIC | Age: 69
End: 2019-05-01
Payer: MEDICARE

## 2019-05-01 VITALS
WEIGHT: 143.5 LBS | HEART RATE: 91 BPM | DIASTOLIC BLOOD PRESSURE: 78 MMHG | SYSTOLIC BLOOD PRESSURE: 128 MMHG | BODY MASS INDEX: 24.64 KG/M2

## 2019-05-01 DIAGNOSIS — F29 PSYCHOSIS, UNSPECIFIED PSYCHOSIS TYPE: Primary | ICD-10-CM

## 2019-05-01 DIAGNOSIS — F41.9 ANXIETY: ICD-10-CM

## 2019-05-01 PROCEDURE — 99214 OFFICE O/P EST MOD 30 MIN: CPT | Mod: HCNC,S$GLB,, | Performed by: PSYCHIATRY & NEUROLOGY

## 2019-05-01 PROCEDURE — 1101F PR PT FALLS ASSESS DOC 0-1 FALLS W/OUT INJ PAST YR: ICD-10-PCS | Mod: HCNC,CPTII,S$GLB, | Performed by: PSYCHIATRY & NEUROLOGY

## 2019-05-01 PROCEDURE — 99999 PR PBB SHADOW E&M-EST. PATIENT-LVL III: CPT | Mod: PBBFAC,HCNC,, | Performed by: PSYCHIATRY & NEUROLOGY

## 2019-05-01 PROCEDURE — 1101F PT FALLS ASSESS-DOCD LE1/YR: CPT | Mod: HCNC,CPTII,S$GLB, | Performed by: PSYCHIATRY & NEUROLOGY

## 2019-05-01 PROCEDURE — 99214 PR OFFICE/OUTPT VISIT, EST, LEVL IV, 30-39 MIN: ICD-10-PCS | Mod: HCNC,S$GLB,, | Performed by: PSYCHIATRY & NEUROLOGY

## 2019-05-01 PROCEDURE — 99999 PR PBB SHADOW E&M-EST. PATIENT-LVL III: ICD-10-PCS | Mod: PBBFAC,HCNC,, | Performed by: PSYCHIATRY & NEUROLOGY

## 2019-05-01 RX ORDER — BUSPIRONE HYDROCHLORIDE 30 MG/1
TABLET ORAL
Qty: 60 TABLET | Refills: 3 | Status: SHIPPED | OUTPATIENT
Start: 2019-05-01 | End: 2019-06-28 | Stop reason: SDUPTHER

## 2019-05-01 RX ORDER — OLANZAPINE 5 MG/1
5 TABLET ORAL NIGHTLY
Qty: 30 TABLET | Refills: 3 | Status: SHIPPED | OUTPATIENT
Start: 2019-05-01 | End: 2019-06-28 | Stop reason: SDUPTHER

## 2019-05-01 RX ORDER — MIRTAZAPINE 7.5 MG/1
TABLET, FILM COATED ORAL
Qty: 30 TABLET | Refills: 3 | Status: SHIPPED | OUTPATIENT
Start: 2019-05-01 | End: 2019-06-28 | Stop reason: SDUPTHER

## 2019-05-01 RX ORDER — DULOXETIN HYDROCHLORIDE 30 MG/1
30 CAPSULE, DELAYED RELEASE ORAL DAILY
Qty: 30 CAPSULE | Refills: 3 | Status: SHIPPED | OUTPATIENT
Start: 2019-05-01 | End: 2019-06-28 | Stop reason: SDUPTHER

## 2019-05-01 RX ORDER — DULOXETIN HYDROCHLORIDE 60 MG/1
60 CAPSULE, DELAYED RELEASE ORAL DAILY
Qty: 30 CAPSULE | Refills: 3 | Status: SHIPPED | OUTPATIENT
Start: 2019-05-01 | End: 2019-06-28 | Stop reason: SDUPTHER

## 2019-05-01 NOTE — PROGRESS NOTES
"Outpatient Psychiatry Follow-up Visit (MD/NP)    5/1/2019    Jaimee Mccarthy, a 68 y.o. female, presenting for follow-up visit. Met with patient.    Reason for Encounter: follow-up, paranoia    Interval Hx: Pt seen and interviewed for follow-up, last seen about three months ago. 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 Had hard time getting home with night blindness. Health is ok. Recent labs ok x LDL. Mental health is good. Enjoying gardening - growing vegetables. Not worrying too much.  Sleeping well. Not preoccupied about safety or particularly suspicious.     background: This 66 y/o F presents to [facility], previously a patient of Dr. Degroot(sp?) who she last saw about 3-4 months ago, but who she says disclosed protected health information to a family member so she is seeking new psychiatrist. Reports current medication regimen includes Cymbalta 90 mg (60 mg +30 mg) every morning, & doxepin 100 mg daily at bedtime. Says this regimen is working well. On 8.9.17 - had ER visit by [facility] after EMS called. EMS noted paranoia - cited that she'd ripped shingles off roof, told police she thought someone had been in house several days before after calling them due to things moved in her home, heard something on her roof & when she checked found vents weren't sealed, "strange wiring", called police. ED physician exam documented that she didn't seem to be hallucinating, delusional or thought-disordered & she was discharged. No SI/HI. No AVH. PsychHx: as above; "Depression with anxiety" - office-based outpt treatment, inpatient treatment - x2-3 (for changing sleep medication in context of highly distressing insomnia); Series of previous antidepressants - reports having when started Cymbalta 90 mg daily, helped moods far more than prior treatments ("when I started it I thought I was reborn"). Doxepin 100 mg qhs. Partially helpful for Insomnia; Two prior psychiatrist, previously Dr. Degroot(sp?) Treating her " "with same medications. Another at [facility], threatened to turn him in for medicaid fraud. 8.9.17 - ER visit by [facility] after EMS called. EMS noted paranoia - cited that she'd ripped shingles off roof, told police she thought someone had been in house several days before after calling them due to things moved in her home, heard something on her roof & when she checked found vents weren't sealed, "strange wiring", called police. No found to be psychotic on ED assessment. No other PEC's though had visit from a state agency in '16 due to concern by an unknown neighbor for "delusional", "acting out at her apartment" (report didn't describe her specific acting out behavior).  had  come out for a wellness check - "someone in the complex said I wasn't taking care of myself", said my "apartment was a wreck".  found her apartment in order, didn't take her in for care. Denies conflicts with others or reasons someone might make a bad shara report. Has been out of that complex x 5-6 months. One remote episode of passive suicidality in '13 when went in for lumpectomy - ended up with mastectomy, + lymph node dissection then learned pathology showed no cancer. FamHx: sister with serious mental illness, son dyslexia. MedHx: Rib Removed. Breast CA dx for which she had mastectomy - reports pathology later indicated she didn't have CA. Cervical CA (s/p cone) - History of cervical cancer many years ago: Basal and squamous cell CA (nares). Hyperlipidemia. Recurrent urinary tract infections. SocialHx: born in [facility], raised in . 3 sibs growing up; much older (18 and 20 years). lives in subsidized disability housing; sister institutionalized. Lived with patient's parents as an adult. On/off meds. Cycle of hospitalizations. Functioned ok on meds, not off. Doesn't know diagnoses. Normal socially & academically. 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 Moved to " "[place] to be with son.  x 1 child. Was single mother. Lives in [place].  x 2 (,  36 years). Supportive people "all " - both parents and siblings . Trying to get moved in to house. Trying to volunteer with the food bank. Wants to join [employer]. Social security - recognized mental health disability. Takes medication every day.     Review Of Systems:     GENERAL:  No weight gain or loss  SKIN:  No rashes or lacerations  HEAD:  No headaches  CHEST:  No shortness of breath, hyperventilation or cough  CARDIOVASCULAR:  No tachycardia or chest pain  ABDOMEN:  No nausea, vomiting, pain, constipation or diarrhea  URINARY:  No frequency, dysuria or sexual dysfunction  ENDOCRINE:  No polydipsia, polyuria  MUSCULOSKELETAL:  No pain or stiffness of the joints  NEUROLOGIC:  No weakness, sensory changes, seizures, confusion, memory loss, tremor or other abnormal movements    Current Evaluation:     Nutritional Screening: Considering the patient's height and weight, medications, medical history and preferences, should a referral be made to the dietitian? no    Constitutional  Vitals:  Most recent vital signs, dated less than 90 days prior to this appointment, were not reviewed.  There were no vitals filed for this visit.     General:  unremarkable, age appropriate     Musculoskeletal  Muscle Strength/Tone:  no tremor, no tic   Gait & Station:  non-ataxic     Psychiatric  Appearance: casually dressed & groomed;   Behavior: calm,   Cooperation: cooperative with assessment  Speech: normal rate, volume, tone  Thought Process: linear, goal-directed  Thought Content: No suicidal or homicidal ideation; no delusions  Affect: mildly anxious  Mood: mildly anxious  Perceptions: No auditory or visual hallucinations  Level of Consciousness: alert throughout interview  Insight: fair  Cognition: Oriented to person, place, time, & situation  Memory: no apparent deficits to general clinical interview; not " formally assessed  Attention/Concentration: no apparent deficits to general clinical interview; not formally assessed  Fund of Knowledge: average by vocabulary/education    Laboratory Data  No visits with results within 1 Month(s) from this visit.   Latest known visit with results is:   Office Visit on 02/06/2019   Component Date Value Ref Range Status    HPV High Risk type 16, PCR 02/06/2019 Negative  Negative Final    HPV High Risk type 18, PCR 02/06/2019 Negative  Negative Final    HPV other High Risk types, PCR 02/06/2019 Negative  Negative Final     Medications  Outpatient Encounter Medications as of 5/1/2019   Medication Sig Dispense Refill    busPIRone (BUSPAR) 30 MG Tab TAKE 1 TABLET BY MOUTH TWICE DAILY FOR ANXIETY 60 tablet 2    DULoxetine (CYMBALTA) 30 MG capsule Take 1 capsule (30 mg total) by mouth once daily. 30 capsule 2    DULoxetine (CYMBALTA) 60 MG capsule Take 1 capsule (60 mg total) by mouth once daily. 30 capsule 2    furosemide (LASIX) 20 MG tablet Take 1 tablet (20 mg total) by mouth as needed. 20 tablet 1    mirtazapine (REMERON) 15 MG tablet       mirtazapine (REMERON) 7.5 MG Tab Take 1 to 2 tablets at bedtime as needed for sleep. 60 tablet 2    OLANZapine (ZYPREXA) 5 MG tablet Take 1 tablet (5 mg total) by mouth nightly. 30 tablet 2     No facility-administered encounter medications on file as of 5/1/2019.      Assessment - Diagnosis - Goals:     Impression: 69 y/o F with hx of chronic, seeming intermittent paranoia, possibly hallucinations as well. Poor insight into nature of symptoms. Self-identifies her mental health problems as with anxiety, depression for years. Has had a PEC for concerns for paranoia and near-PEC for alleged neglect, hospitalization in 2018 for psychosis. Hasn't tolerated and self-discontinued 2 antipsychotics, but tolerates olanazpine ok. Free-floating anxiety unrelated to paranoia continues to be a problem. No psychosis recurrence since hospitalization, on  "antipsychotic.     Dx: psychosis. insomnia, anxiety,    Treatment Goals:  Specify outcomes written in observable, behavioral terms:   Reduce paranoia. Control depression and anxiety symptoms by self-report    Treatment Plan/Recommendations:   · Continue mirtazapine, olanzapine, duloxetine for pain and mood. add buspirone for anxiety.Observe for paranoia, other symptoms, stop duloxetine if symptoms return. Ms. Briggs to serve as "reality mentor", contact us should she notice Ms. Mccarthy paranoia returning/worsening.   · Discussed risks, benefits, and alternatives to treatment plan documented above with patient. I answered all patient questions related to this plan and patient expressed understanding and agreement.     Return to Clinic: 3 months    Counseling time: 10 minutes  Total time: 25 minutes    VIPIN Chavez MD  "

## 2019-05-26 RX ORDER — OLANZAPINE 5 MG/1
TABLET ORAL
Qty: 30 TABLET | Refills: 0 | OUTPATIENT
Start: 2019-05-26

## 2019-05-26 RX ORDER — BUSPIRONE HYDROCHLORIDE 15 MG/1
TABLET ORAL
Qty: 120 TABLET | Refills: 0 | OUTPATIENT
Start: 2019-05-26

## 2019-05-26 RX ORDER — MIRTAZAPINE 15 MG/1
TABLET, FILM COATED ORAL
Qty: 30 TABLET | Refills: 0 | OUTPATIENT
Start: 2019-05-26

## 2019-06-24 RX ORDER — OLANZAPINE 5 MG/1
TABLET ORAL
Qty: 30 TABLET | Refills: 0 | OUTPATIENT
Start: 2019-06-24

## 2019-06-24 RX ORDER — BUSPIRONE HYDROCHLORIDE 15 MG/1
TABLET ORAL
Qty: 120 TABLET | Refills: 0 | OUTPATIENT
Start: 2019-06-24

## 2019-06-24 RX ORDER — MIRTAZAPINE 15 MG/1
TABLET, FILM COATED ORAL
Qty: 30 TABLET | Refills: 0 | OUTPATIENT
Start: 2019-06-24

## 2019-06-28 RX ORDER — MIRTAZAPINE 7.5 MG/1
TABLET, FILM COATED ORAL
Qty: 30 TABLET | Refills: 3 | Status: SHIPPED | OUTPATIENT
Start: 2019-06-28 | End: 2019-07-07 | Stop reason: SDUPTHER

## 2019-06-28 RX ORDER — DULOXETIN HYDROCHLORIDE 60 MG/1
60 CAPSULE, DELAYED RELEASE ORAL DAILY
Qty: 30 CAPSULE | Refills: 3 | Status: SHIPPED | OUTPATIENT
Start: 2019-06-28 | End: 2019-08-28 | Stop reason: SDUPTHER

## 2019-06-28 RX ORDER — MIRTAZAPINE 15 MG/1
TABLET, FILM COATED ORAL
Qty: 30 TABLET | Refills: 0 | OUTPATIENT
Start: 2019-06-28

## 2019-06-28 RX ORDER — DULOXETIN HYDROCHLORIDE 30 MG/1
30 CAPSULE, DELAYED RELEASE ORAL DAILY
Qty: 30 CAPSULE | Refills: 3 | Status: SHIPPED | OUTPATIENT
Start: 2019-06-28 | End: 2019-08-28 | Stop reason: SDUPTHER

## 2019-06-28 RX ORDER — OLANZAPINE 5 MG/1
TABLET ORAL
Qty: 30 TABLET | Refills: 3 | Status: SHIPPED | OUTPATIENT
Start: 2019-06-28 | End: 2019-08-28 | Stop reason: SDUPTHER

## 2019-06-28 RX ORDER — BUSPIRONE HYDROCHLORIDE 30 MG/1
TABLET ORAL
Qty: 60 TABLET | Refills: 3 | Status: SHIPPED | OUTPATIENT
Start: 2019-06-28 | End: 2019-08-28 | Stop reason: SDUPTHER

## 2019-07-07 RX ORDER — MIRTAZAPINE 15 MG/1
TABLET, FILM COATED ORAL
Qty: 30 TABLET | Refills: 1 | Status: SHIPPED | OUTPATIENT
Start: 2019-07-07 | End: 2019-08-28 | Stop reason: SDUPTHER

## 2019-07-24 RX ORDER — DULOXETIN HYDROCHLORIDE 30 MG/1
CAPSULE, DELAYED RELEASE ORAL
Qty: 30 CAPSULE | Refills: 0 | OUTPATIENT
Start: 2019-07-24

## 2019-07-24 RX ORDER — DULOXETIN HYDROCHLORIDE 60 MG/1
CAPSULE, DELAYED RELEASE ORAL
Qty: 30 CAPSULE | Refills: 0 | OUTPATIENT
Start: 2019-07-24

## 2019-07-24 RX ORDER — BUSPIRONE HYDROCHLORIDE 15 MG/1
TABLET ORAL
Qty: 120 TABLET | Refills: 0 | OUTPATIENT
Start: 2019-07-24

## 2019-08-28 ENCOUNTER — OFFICE VISIT (OUTPATIENT)
Dept: PSYCHIATRY | Facility: CLINIC | Age: 69
End: 2019-08-28
Payer: MEDICARE

## 2019-08-28 VITALS
SYSTOLIC BLOOD PRESSURE: 116 MMHG | DIASTOLIC BLOOD PRESSURE: 77 MMHG | WEIGHT: 147.06 LBS | BODY MASS INDEX: 25.24 KG/M2 | HEART RATE: 83 BPM

## 2019-08-28 DIAGNOSIS — F29 PSYCHOSIS, UNSPECIFIED PSYCHOSIS TYPE: Primary | ICD-10-CM

## 2019-08-28 PROCEDURE — 1101F PR PT FALLS ASSESS DOC 0-1 FALLS W/OUT INJ PAST YR: ICD-10-PCS | Mod: HCNC,CPTII,S$GLB, | Performed by: PSYCHIATRY & NEUROLOGY

## 2019-08-28 PROCEDURE — 99999 PR PBB SHADOW E&M-EST. PATIENT-LVL II: CPT | Mod: PBBFAC,HCNC,, | Performed by: PSYCHIATRY & NEUROLOGY

## 2019-08-28 PROCEDURE — 1101F PT FALLS ASSESS-DOCD LE1/YR: CPT | Mod: HCNC,CPTII,S$GLB, | Performed by: PSYCHIATRY & NEUROLOGY

## 2019-08-28 PROCEDURE — 99213 OFFICE O/P EST LOW 20 MIN: CPT | Mod: HCNC,S$GLB,, | Performed by: PSYCHIATRY & NEUROLOGY

## 2019-08-28 PROCEDURE — 99213 PR OFFICE/OUTPT VISIT, EST, LEVL III, 20-29 MIN: ICD-10-PCS | Mod: HCNC,S$GLB,, | Performed by: PSYCHIATRY & NEUROLOGY

## 2019-08-28 PROCEDURE — 99999 PR PBB SHADOW E&M-EST. PATIENT-LVL II: ICD-10-PCS | Mod: PBBFAC,HCNC,, | Performed by: PSYCHIATRY & NEUROLOGY

## 2019-08-28 RX ORDER — OLANZAPINE 5 MG/1
5 TABLET ORAL NIGHTLY
Qty: 30 TABLET | Refills: 3 | Status: SHIPPED | OUTPATIENT
Start: 2019-08-28 | End: 2019-12-18 | Stop reason: SDUPTHER

## 2019-08-28 RX ORDER — DULOXETIN HYDROCHLORIDE 60 MG/1
60 CAPSULE, DELAYED RELEASE ORAL DAILY
Qty: 30 CAPSULE | Refills: 3 | Status: SHIPPED | OUTPATIENT
Start: 2019-08-28 | End: 2019-09-06 | Stop reason: SDUPTHER

## 2019-08-28 RX ORDER — DULOXETIN HYDROCHLORIDE 30 MG/1
30 CAPSULE, DELAYED RELEASE ORAL DAILY
Qty: 30 CAPSULE | Refills: 3 | Status: SHIPPED | OUTPATIENT
Start: 2019-08-28 | End: 2019-09-06 | Stop reason: SDUPTHER

## 2019-08-28 RX ORDER — BUSPIRONE HYDROCHLORIDE 30 MG/1
TABLET ORAL
Qty: 60 TABLET | Refills: 3 | Status: SHIPPED | OUTPATIENT
Start: 2019-08-28 | End: 2020-07-17

## 2019-08-28 RX ORDER — MIRTAZAPINE 15 MG/1
TABLET, FILM COATED ORAL
Qty: 45 TABLET | Refills: 3 | Status: SHIPPED | OUTPATIENT
Start: 2019-08-28 | End: 2019-12-18 | Stop reason: SDUPTHER

## 2019-08-28 NOTE — PROGRESS NOTES
"Outpatient Psychiatry Follow-up Visit (MD/NP)    8/28/2019    Jaimee Mccarthy, a 68 y.o. female, presenting for follow-up visit. Met with patient.    Reason for Encounter: follow-up, paranoia    Interval Hx: Pt seen and interviewed for follow-up, last seen about three months ago. Reports moods mostly euthymic, denies suspiciousness. Sleep is somewhat worse, however. Reports subjective weight gain. No new stressors. Adherent to medication.      background: This 66 y/o F presents to [facility], previously a patient of Dr. Degroot(sp?) who she last saw about 3-4 months ago, but who she says disclosed protected health information to a family member so she is seeking new psychiatrist. Reports current medication regimen includes Cymbalta 90 mg (60 mg +30 mg) every morning, & doxepin 100 mg daily at bedtime. Says this regimen is working well. On 8.9.17 - had ER visit by [facility] after EMS called. EMS noted paranoia - cited that she'd ripped shingles off roof, told police she thought someone had been in house several days before after calling them due to things moved in her home, heard something on her roof & when she checked found vents weren't sealed, "strange wiring", called police. ED physician exam documented that she didn't seem to be hallucinating, delusional or thought-disordered & she was discharged. No SI/HI. No AVH. PsychHx: as above; "Depression with anxiety" - office-based outpt treatment, inpatient treatment - x2-3 (for changing sleep medication in context of highly distressing insomnia); Series of previous antidepressants - reports having when started Cymbalta 90 mg daily, helped moods far more than prior treatments ("when I started it I thought I was reborn"). Doxepin 100 mg qhs. Partially helpful for Insomnia; Two prior psychiatrist, previously Dr. Degroot(sp?) Treating her with same medications. Another at [facility], threatened to turn him in for medicaid fraud. 8.9.17 - ER visit by [facility] " "after EMS called. EMS noted paranoia - cited that she'd ripped shingles off roof, told police she thought someone had been in house several days before after calling them due to things moved in her home, heard something on her roof & when she checked found vents weren't sealed, "strange wiring", called police. No found to be psychotic on ED assessment. No other PEC's though had visit from a state agency in '16 due to concern by an unknown neighbor for "delusional", "acting out at her apartment" (report didn't describe her specific acting out behavior).  had  come out for a wellness check - "someone in the complex said I wasn't taking care of myself", said my "apartment was a wreck".  found her apartment in order, didn't take her in for care. Denies conflicts with others or reasons someone might make a bad shara report. Has been out of that complex x 5-6 months. One remote episode of passive suicidality in '13 when went in for lumpectomy - ended up with mastectomy, + lymph node dissection then learned pathology showed no cancer. FamHx: sister with serious mental illness, son dyslexia. MedHx: Rib Removed. Breast CA dx for which she had mastectomy - reports pathology later indicated she didn't have CA. Cervical CA (s/p cone) - History of cervical cancer many years ago: Basal and squamous cell CA (nares). Hyperlipidemia. Recurrent urinary tract infections. SocialHx: [place], raised [place] . 3 sibs growing up; much older (18 and 20 years). lives in subsidized disability housing; sister institutionalized. Lived with patient's parents as an adult. On/off meds. Cycle of hospitalizations. Functioned ok on meds, not off. Doesn't know diagnoses. Normal socially & academically. 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  x 1 child. Was single mother. Lives in [place].  x 2 (,  36 years). Supportive people "all " "" - both parents and siblings . Trying to get moved in to house. Trying to volunteer with the food bank. Wants to join [employer]. Social security - recognized mental health disability. Takes medication every day.     Review Of Systems:     GENERAL:  No weight gain or loss  SKIN:  No rashes or lacerations  HEAD:  No headaches  CHEST:  No shortness of breath, hyperventilation or cough  CARDIOVASCULAR:  No tachycardia or chest pain  ABDOMEN:  No nausea, vomiting, pain, constipation or diarrhea  URINARY:  No frequency, dysuria or sexual dysfunction  ENDOCRINE:  No polydipsia, polyuria  MUSCULOSKELETAL:  No pain or stiffness of the joints  NEUROLOGIC:  No weakness, sensory changes, seizures, confusion, memory loss, tremor or other abnormal movements    Current Evaluation:     Nutritional Screening: Considering the patient's height and weight, medications, medical history and preferences, should a referral be made to the dietitian? no    Constitutional  Vitals:  Most recent vital signs, dated less than 90 days prior to this appointment, were not reviewed.  There were no vitals filed for this visit.     General:  unremarkable, age appropriate     Musculoskeletal  Muscle Strength/Tone:  no tremor, no tic   Gait & Station:  non-ataxic     Psychiatric  Appearance: casually dressed & groomed;   Behavior: calm,   Cooperation: cooperative with assessment  Speech: normal rate, volume, tone  Thought Process: linear, goal-directed  Thought Content: No suicidal or homicidal ideation; no delusions  Affect: mildly anxious  Mood: mildly anxious  Perceptions: No auditory or visual hallucinations  Level of Consciousness: alert throughout interview  Insight: fair  Cognition: Oriented to person, place, time, & situation  Memory: no apparent deficits to general clinical interview; not formally assessed  Attention/Concentration: no apparent deficits to general clinical interview; not formally assessed  Fund of Knowledge: average " by vocabulary/education    Laboratory Data  No visits with results within 1 Month(s) from this visit.   Latest known visit with results is:   Office Visit on 02/06/2019   Component Date Value Ref Range Status    HPV High Risk type 16, PCR 02/06/2019 Negative  Negative Final    HPV High Risk type 18, PCR 02/06/2019 Negative  Negative Final    HPV other High Risk types, PCR 02/06/2019 Negative  Negative Final     Medications  Outpatient Encounter Medications as of 8/28/2019   Medication Sig Dispense Refill    busPIRone (BUSPAR) 30 MG Tab TAKE 1 TABLET BY MOUTH TWICE DAILY FOR ANXIETY 60 tablet 3    DULoxetine (CYMBALTA) 30 MG capsule Take 1 capsule (30 mg total) by mouth once daily. 30 capsule 3    DULoxetine (CYMBALTA) 60 MG capsule Take 1 capsule (60 mg total) by mouth once daily. 30 capsule 3    furosemide (LASIX) 20 MG tablet Take 1 tablet (20 mg total) by mouth as needed. 20 tablet 1    mirtazapine (REMERON) 15 MG tablet TAKE 1/2 TO 1 TABLET ONCE DAILY AT BEDTIME AS NEEDED FOR SLEEP. 30 tablet 1    OLANZapine (ZYPREXA) 5 MG tablet TAKE 1 TABLET BY MOUTH DAILY AT BEDTIME. 30 tablet 3     No facility-administered encounter medications on file as of 8/28/2019.      Assessment - Diagnosis - Goals:     Impression: 67 y/o F with hx of chronic, seeming intermittent paranoia, possibly hallucinations as well. Poor insight into nature of symptoms. Self-identifies her mental health problems as with anxiety, depression for years. Has had a PEC for concerns for paranoia and near-PEC for alleged neglect, hospitalization in 2018 for psychosis. Hasn't tolerated and self-discontinued 2 antipsychotics, but tolerates olanazpine ok. Free-floating anxiety unrelated to paranoia continues to be a problem. No psychosis recurrence since hospitalization, on antipsychotic. Possible medication-related weight gain is mild thus far.    Dx: psychosis. insomnia, anxiety,    Treatment Goals:  Specify outcomes written in observable,  "behavioral terms:   Reduce paranoia. Control depression and anxiety symptoms by self-report    Treatment Plan/Recommendations:   · Continue mirtazapine, olanzapine, duloxetine for pain and mood. add buspirone for anxiety.Observe for paranoia, other symptoms, stop duloxetine if symptoms return. Ms. Briggs to serve as "reality mentor", contact us should she notice Ms. Mccarthy paranoia returning/worsening.   · Discussed risks, benefits, and alternatives to treatment plan documented above with patient. I answered all patient questions related to this plan and patient expressed understanding and agreement.     Return to Clinic: 3 months    Counseling time: 5 minutes  Total time: 20 minutes    VIPIN Chavez MD  "

## 2019-09-05 RX ORDER — DULOXETIN HYDROCHLORIDE 30 MG/1
CAPSULE, DELAYED RELEASE ORAL
Qty: 30 CAPSULE | Refills: 0 | OUTPATIENT
Start: 2019-09-05

## 2019-09-05 RX ORDER — DULOXETIN HYDROCHLORIDE 60 MG/1
CAPSULE, DELAYED RELEASE ORAL
Qty: 30 CAPSULE | Refills: 0 | OUTPATIENT
Start: 2019-09-05

## 2019-09-06 RX ORDER — DULOXETIN HYDROCHLORIDE 30 MG/1
30 CAPSULE, DELAYED RELEASE ORAL DAILY
Qty: 30 CAPSULE | Refills: 3 | Status: SHIPPED | OUTPATIENT
Start: 2019-09-06 | End: 2019-09-06 | Stop reason: SDUPTHER

## 2019-09-06 RX ORDER — DULOXETIN HYDROCHLORIDE 60 MG/1
60 CAPSULE, DELAYED RELEASE ORAL DAILY
Qty: 30 CAPSULE | Refills: 3 | Status: SHIPPED | OUTPATIENT
Start: 2019-09-06 | End: 2019-09-06 | Stop reason: SDUPTHER

## 2019-09-06 RX ORDER — DULOXETIN HYDROCHLORIDE 60 MG/1
60 CAPSULE, DELAYED RELEASE ORAL DAILY
Qty: 30 CAPSULE | Refills: 3 | Status: SHIPPED | OUTPATIENT
Start: 2019-09-06 | End: 2019-12-18 | Stop reason: SDUPTHER

## 2019-09-06 RX ORDER — DULOXETIN HYDROCHLORIDE 30 MG/1
30 CAPSULE, DELAYED RELEASE ORAL DAILY
Qty: 30 CAPSULE | Refills: 3 | Status: SHIPPED | OUTPATIENT
Start: 2019-09-06 | End: 2019-12-18 | Stop reason: SDUPTHER

## 2019-12-18 ENCOUNTER — OFFICE VISIT (OUTPATIENT)
Dept: PSYCHIATRY | Facility: CLINIC | Age: 69
End: 2019-12-18
Payer: MEDICARE

## 2019-12-18 VITALS — WEIGHT: 154.56 LBS | BODY MASS INDEX: 26.53 KG/M2

## 2019-12-18 DIAGNOSIS — F41.9 ANXIETY: ICD-10-CM

## 2019-12-18 DIAGNOSIS — F29 PSYCHOSIS, UNSPECIFIED PSYCHOSIS TYPE: Primary | ICD-10-CM

## 2019-12-18 PROCEDURE — 99213 PR OFFICE/OUTPT VISIT, EST, LEVL III, 20-29 MIN: ICD-10-PCS | Mod: HCNC,S$GLB,, | Performed by: PSYCHIATRY & NEUROLOGY

## 2019-12-18 PROCEDURE — 99499 RISK ADDL DX/OHS AUDIT: ICD-10-PCS | Mod: S$GLB,,, | Performed by: PSYCHIATRY & NEUROLOGY

## 2019-12-18 PROCEDURE — 1159F MED LIST DOCD IN RCRD: CPT | Mod: HCNC,S$GLB,, | Performed by: PSYCHIATRY & NEUROLOGY

## 2019-12-18 PROCEDURE — 99999 PR PBB SHADOW E&M-EST. PATIENT-LVL II: ICD-10-PCS | Mod: PBBFAC,HCNC,, | Performed by: PSYCHIATRY & NEUROLOGY

## 2019-12-18 PROCEDURE — 1101F PR PT FALLS ASSESS DOC 0-1 FALLS W/OUT INJ PAST YR: ICD-10-PCS | Mod: HCNC,CPTII,S$GLB, | Performed by: PSYCHIATRY & NEUROLOGY

## 2019-12-18 PROCEDURE — 99499 UNLISTED E&M SERVICE: CPT | Mod: S$GLB,,, | Performed by: PSYCHIATRY & NEUROLOGY

## 2019-12-18 PROCEDURE — 99999 PR PBB SHADOW E&M-EST. PATIENT-LVL II: CPT | Mod: PBBFAC,HCNC,, | Performed by: PSYCHIATRY & NEUROLOGY

## 2019-12-18 PROCEDURE — 1159F PR MEDICATION LIST DOCUMENTED IN MEDICAL RECORD: ICD-10-PCS | Mod: HCNC,S$GLB,, | Performed by: PSYCHIATRY & NEUROLOGY

## 2019-12-18 PROCEDURE — 1101F PT FALLS ASSESS-DOCD LE1/YR: CPT | Mod: HCNC,CPTII,S$GLB, | Performed by: PSYCHIATRY & NEUROLOGY

## 2019-12-18 PROCEDURE — 99213 OFFICE O/P EST LOW 20 MIN: CPT | Mod: HCNC,S$GLB,, | Performed by: PSYCHIATRY & NEUROLOGY

## 2019-12-18 RX ORDER — OLANZAPINE 5 MG/1
5 TABLET ORAL NIGHTLY
Qty: 30 TABLET | Refills: 3 | Status: SHIPPED | OUTPATIENT
Start: 2019-12-18 | End: 2020-06-09 | Stop reason: SDUPTHER

## 2019-12-18 RX ORDER — DULOXETIN HYDROCHLORIDE 60 MG/1
60 CAPSULE, DELAYED RELEASE ORAL DAILY
Qty: 30 CAPSULE | Refills: 3 | Status: SHIPPED | OUTPATIENT
Start: 2019-12-18 | End: 2020-05-14

## 2019-12-18 RX ORDER — MIRTAZAPINE 15 MG/1
TABLET, FILM COATED ORAL
Qty: 45 TABLET | Refills: 3 | Status: SHIPPED | OUTPATIENT
Start: 2019-12-18 | End: 2020-04-14 | Stop reason: DRUGHIGH

## 2019-12-18 RX ORDER — DULOXETIN HYDROCHLORIDE 30 MG/1
30 CAPSULE, DELAYED RELEASE ORAL DAILY
Qty: 30 CAPSULE | Refills: 3 | Status: SHIPPED | OUTPATIENT
Start: 2019-12-18 | End: 2020-05-14

## 2019-12-18 NOTE — PROGRESS NOTES
"Outpatient Psychiatry Follow-up Visit (MD/NP)    12/18/2019    Jaimee Mccarthy, a 69 y.o. female, presenting for follow-up visit. Met with patient.    Reason for Encounter: follow-up, paranoia    Interval Hx: Pt seen and interviewed for follow-up, last seen about 4 months ago. 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 For past 2 weeks. 5 days/week between the two jobs. Reports her health is good. Feeling mostly well emotionally. Denies suspiciousness. No new health symptoms. No new mental health symptoms. Will spend the holiday with the family. Adherent to medications. Denies side effects.     background: This 66 y/o F presents to [facility], previously a patient of Dr. Degroot(sp?) who she last saw about 3-4 months ago, but who she says disclosed protected health information to a family member so she is seeking new psychiatrist. Reports current medication regimen includes Cymbalta 90 mg (60 mg +30 mg) every morning, & doxepin 100 mg daily at bedtime. Says this regimen is working well. On 8.9.17 - had ER visit by [facility] after EMS called. EMS noted paranoia - cited that she'd ripped shingles off roof, told police she thought someone had been in house several days before after calling them due to things moved in her home, heard something on her roof & when she checked found vents weren't sealed, "strange wiring", called police. ED physician exam documented that she didn't seem to be hallucinating, delusional or thought-disordered & she was discharged. No SI/HI. No AVH. PsychHx: as above; "Depression with anxiety" - office-based outpt treatment, inpatient treatment - x2-3 (for changing sleep medication in context of highly distressing insomnia); Series of previous antidepressants - reports having when started Cymbalta 90 mg daily, helped moods far more than prior treatments ("when I started it I thought I was reborn"). Doxepin 100 mg qhs. Partially helpful for Insomnia; Two prior " "psychiatrist, previously Dr. Degroot(sp?) Treating her with same medications. Another at [facility], threatened to turn him in for medicaid fraud. 8.9.17 - ER visit by [facility] after EMS called. EMS noted paranoia - cited that she'd ripped shingles off roof, told police she thought someone had been in house several days before after calling them due to things moved in her home, heard something on her roof & when she checked found vents weren't sealed, "strange wiring", called police. No found to be psychotic on ED assessment. No other PEC's though had visit from a state agency in '16 due to concern by an unknown neighbor for "delusional", "acting out at her apartment" (report didn't describe her specific acting out behavior).  had  come out for a wellness check - "someone in the complex said I wasn't taking care of myself", said my "apartment was a wreck".  found her apartment in order, didn't take her in for care. Denies conflicts with others or reasons someone might make a bad shara report. Has been out of that complex x 5-6 months. One remote episode of passive suicidality in '13 when went in for lumpectomy - ended up with mastectomy, + lymph node dissection then learned pathology showed no cancer. FamHx: sister with serious mental illness, son dyslexia. MedHx: Rib Removed. Breast CA dx for which she had mastectomy - reports pathology later indicated she didn't have CA. Cervical CA (s/p cone) - History of cervical cancer many years ago: Basal and squamous cell CA (nares). Hyperlipidemia. Recurrent urinary tract infections. SocialHx: born in [facility], raised in . 3 sibs growing up; much older (18 and 20 years). lives in subsidized disability housing; sister institutionalized. Lived with patient's parents as an adult. On/off meds. Cycle of hospitalizations. Functioned ok on meds, not off. Doesn't know diagnoses. Normal socially & academically. 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 Moved to [place] to be with son.  x 1 child. Was single mother. Lives in [place].  x 2 (,  36 years). Supportive people "all " - both parents and siblings . Trying to get moved in to house. Trying to volunteer with the food bank. Wants to join [employer]. Social security - recognized mental health disability. Takes medication every day.     Review Of Systems:     GENERAL:  No weight gain or loss  SKIN:  No rashes or lacerations  HEAD:  No headaches  CHEST:  No shortness of breath, hyperventilation or cough  CARDIOVASCULAR:  No tachycardia or chest pain  ABDOMEN:  No nausea, vomiting, pain, constipation or diarrhea  URINARY:  No frequency, dysuria or sexual dysfunction  ENDOCRINE:  No polydipsia, polyuria  MUSCULOSKELETAL:  No pain or stiffness of the joints  NEUROLOGIC:  No weakness, sensory changes, seizures, confusion, memory loss, tremor or other abnormal movements    Current Evaluation:     Nutritional Screening: Considering the patient's height and weight, medications, medical history and preferences, should a referral be made to the dietitian? no    Constitutional  Vitals:  Most recent vital signs, dated less than 90 days prior to this appointment, were not reviewed.  There were no vitals filed for this visit.     General:  unremarkable, age appropriate     Musculoskeletal  Muscle Strength/Tone:  no tremor, no tic   Gait & Station:  non-ataxic     Psychiatric  Appearance: casually dressed & groomed;   Behavior: calm,   Cooperation: cooperative with assessment  Speech: normal rate, volume, tone  Thought Process: linear, goal-directed  Thought Content: No suicidal or homicidal ideation; no delusions  Affect: mildly anxious  Mood: mildly anxious  Perceptions: No auditory or visual hallucinations  Level of Consciousness: alert throughout interview  Insight: fair  Cognition: Oriented to person, place, time, & situation  Memory: no apparent " deficits to general clinical interview; not formally assessed  Attention/Concentration: no apparent deficits to general clinical interview; not formally assessed  Fund of Knowledge: average by vocabulary/education    Laboratory Data  No visits with results within 1 Month(s) from this visit.   Latest known visit with results is:   Office Visit on 02/06/2019   Component Date Value Ref Range Status    HPV High Risk type 16, PCR 02/06/2019 Negative  Negative Final    HPV High Risk type 18, PCR 02/06/2019 Negative  Negative Final    HPV other High Risk types, PCR 02/06/2019 Negative  Negative Final     Medications  Outpatient Encounter Medications as of 12/18/2019   Medication Sig Dispense Refill    busPIRone (BUSPAR) 30 MG Tab TAKE 1 TABLET BY MOUTH TWICE DAILY FOR ANXIETY 60 tablet 3    DULoxetine (CYMBALTA) 30 MG capsule Take 1 capsule (30 mg total) by mouth once daily. 30 capsule 3    DULoxetine (CYMBALTA) 60 MG capsule Take 1 capsule (60 mg total) by mouth once daily. 30 capsule 3    furosemide (LASIX) 20 MG tablet Take 1 tablet (20 mg total) by mouth as needed. 20 tablet 1    mirtazapine (REMERON) 15 MG tablet Take 1/2 to 1 and 1/2 tablets at bedtime as needed for sleep 45 tablet 3    OLANZapine (ZYPREXA) 5 MG tablet Take 1 tablet (5 mg total) by mouth nightly. 30 tablet 3     No facility-administered encounter medications on file as of 12/18/2019.      Assessment - Diagnosis - Goals:     Impression: 67 y/o F with hx of chronic, seeming intermittent paranoia, possibly hallucinations as well. Poor insight into nature of symptoms. Self-identifies her mental health problems as with anxiety, depression for years. Has had a PEC for concerns for paranoia and near-PEC for alleged neglect, hospitalization in 2018 for psychosis. Hasn't tolerated and self-discontinued 2 antipsychotics, but tolerates olanazpine ok. Free-floating anxiety unrelated to paranoia intermittently continues to be a problem, better recently.  "No psychosis recurrence since hospitalization, on antipsychotic. Possible medication-related weight gain is mild thus far.    Dx: psychosis. insomnia, anxiety,    Treatment Goals:  Specify outcomes written in observable, behavioral terms:   Reduce paranoia. Control depression and anxiety symptoms by self-report    Treatment Plan/Recommendations:   · Continue mirtazapine, olanzapine, duloxetine for pain and mood. Observe for paranoia, other symptoms, stop duloxetine if symptoms return. Ms. Briggs to serve as "reality mentor", contact us should she notice Ms. Mccarthy paranoia returning/worsening.   · Discussed risks, benefits, and alternatives to treatment plan documented above with patient. I answered all patient questions related to this plan and patient expressed understanding and agreement.     Return to Clinic: 3 months    Counseling time: 5 minutes  Total time: 20 minutes    VIPIN Chavez MD  "

## 2020-03-06 ENCOUNTER — PATIENT OUTREACH (OUTPATIENT)
Dept: ADMINISTRATIVE | Facility: HOSPITAL | Age: 70
End: 2020-03-06

## 2020-03-06 ENCOUNTER — PATIENT OUTREACH (OUTPATIENT)
Dept: ADMINISTRATIVE | Facility: OTHER | Age: 70
End: 2020-03-06

## 2020-03-06 DIAGNOSIS — Z12.11 SCREENING FOR COLON CANCER: Primary | ICD-10-CM

## 2020-03-09 ENCOUNTER — OFFICE VISIT (OUTPATIENT)
Dept: OPHTHALMOLOGY | Facility: CLINIC | Age: 70
End: 2020-03-09
Payer: MEDICARE

## 2020-03-09 DIAGNOSIS — H52.4 BILATERAL PRESBYOPIA: ICD-10-CM

## 2020-03-09 DIAGNOSIS — H25.13 CATARACT, NUCLEAR SCLEROTIC SENILE, BILATERAL: Primary | ICD-10-CM

## 2020-03-09 DIAGNOSIS — Z98.890 HISTORY OF REFRACTIVE SURGERY: ICD-10-CM

## 2020-03-09 DIAGNOSIS — H52.03 HYPEROPIA, BILATERAL: ICD-10-CM

## 2020-03-09 DIAGNOSIS — Z46.0 ENCOUNTER FOR FITTING OR ADJUSTMENT OF SPECTACLES OR CONTACT LENSES: ICD-10-CM

## 2020-03-09 PROCEDURE — 92014 PR EYE EXAM, EST PATIENT,COMPREHESV: ICD-10-PCS | Mod: HCNC,S$GLB,, | Performed by: OPTOMETRIST

## 2020-03-09 PROCEDURE — 92015 PR REFRACTION: ICD-10-PCS | Mod: HCNC,S$GLB,, | Performed by: OPTOMETRIST

## 2020-03-09 PROCEDURE — 92015 DETERMINE REFRACTIVE STATE: CPT | Mod: HCNC,S$GLB,, | Performed by: OPTOMETRIST

## 2020-03-09 PROCEDURE — 92310 CONTACT LENS FITTING OU: CPT | Mod: CSM,HCNC,S$GLB, | Performed by: OPTOMETRIST

## 2020-03-09 PROCEDURE — 99999 PR PBB SHADOW E&M-EST. PATIENT-LVL I: CPT | Mod: PBBFAC,HCNC,, | Performed by: OPTOMETRIST

## 2020-03-09 PROCEDURE — 92014 COMPRE OPH EXAM EST PT 1/>: CPT | Mod: HCNC,S$GLB,, | Performed by: OPTOMETRIST

## 2020-03-09 PROCEDURE — 92310 PR CONTACT LENS FITTING (NO CHANGE): ICD-10-PCS | Mod: CSM,HCNC,S$GLB, | Performed by: OPTOMETRIST

## 2020-03-09 PROCEDURE — 99999 PR PBB SHADOW E&M-EST. PATIENT-LVL I: ICD-10-PCS | Mod: PBBFAC,HCNC,, | Performed by: OPTOMETRIST

## 2020-03-09 NOTE — PROGRESS NOTES
HPI     No visual complaints.  Last eye visit 03/05/2019 TRF.  Update glasses and contact lenses RX.  Discussed fee to update contact lenses.    Last edited by Vincent Martinez, OD on 3/9/2020  9:11 AM. (History)            Assessment /Plan     For exam results, see Encounter Report.    Cataract, nuclear sclerotic senile, bilateral    History of refractive surgery    Encounter for fitting or adjustment of spectacles or contact lenses    Hyperopia, bilateral    Bilateral presbyopia      Mild cataracts OU, not surgical.    Stable LASIK, fluctuates throughout the day.    Discussed CL charges.  Dispense Final Rx for glasses  No changes CL Rx  RTC 1 year  Discussed above and answered questions.

## 2020-04-13 ENCOUNTER — TELEPHONE (OUTPATIENT)
Dept: PSYCHIATRY | Facility: CLINIC | Age: 70
End: 2020-04-13

## 2020-04-13 NOTE — TELEPHONE ENCOUNTER
Spoke with pt re her apt this week and she does not have capability for virtual visits and wanted to reschedule 2 months out to 6/9.  Needs refills but also has needed to take 2 15mg mirtazapine nightly for it to be effective and her insurance will only pay for one tab per day so she has had to pay for part of the Rx out of pocket.  Pt is requesting the mirtazapine be prescribed as one 30mg tablet nightly.

## 2020-04-14 RX ORDER — MIRTAZAPINE 30 MG/1
30 TABLET, FILM COATED ORAL NIGHTLY
Qty: 30 TABLET | Refills: 1 | Status: SHIPPED | OUTPATIENT
Start: 2020-04-14 | End: 2020-06-09 | Stop reason: SDUPTHER

## 2020-05-14 RX ORDER — DULOXETIN HYDROCHLORIDE 60 MG/1
CAPSULE, DELAYED RELEASE ORAL
Qty: 30 CAPSULE | Refills: 3 | Status: SHIPPED | OUTPATIENT
Start: 2020-05-14 | End: 2020-06-09 | Stop reason: SDUPTHER

## 2020-05-14 RX ORDER — DULOXETIN HYDROCHLORIDE 30 MG/1
CAPSULE, DELAYED RELEASE ORAL
Qty: 30 CAPSULE | Refills: 3 | Status: SHIPPED | OUTPATIENT
Start: 2020-05-14 | End: 2020-06-09 | Stop reason: SDUPTHER

## 2020-06-09 ENCOUNTER — OFFICE VISIT (OUTPATIENT)
Dept: PSYCHIATRY | Facility: CLINIC | Age: 70
End: 2020-06-09
Payer: MEDICARE

## 2020-06-09 VITALS
SYSTOLIC BLOOD PRESSURE: 141 MMHG | HEART RATE: 89 BPM | DIASTOLIC BLOOD PRESSURE: 100 MMHG | BODY MASS INDEX: 26.15 KG/M2 | WEIGHT: 152.31 LBS

## 2020-06-09 DIAGNOSIS — F41.9 ANXIETY: Primary | ICD-10-CM

## 2020-06-09 DIAGNOSIS — Z86.59 HISTORY OF PSYCHOSIS: ICD-10-CM

## 2020-06-09 PROCEDURE — 99213 OFFICE O/P EST LOW 20 MIN: CPT | Mod: HCNC,S$GLB,, | Performed by: PSYCHIATRY & NEUROLOGY

## 2020-06-09 PROCEDURE — 1159F PR MEDICATION LIST DOCUMENTED IN MEDICAL RECORD: ICD-10-PCS | Mod: HCNC,S$GLB,, | Performed by: PSYCHIATRY & NEUROLOGY

## 2020-06-09 PROCEDURE — 99999 PR PBB SHADOW E&M-EST. PATIENT-LVL II: CPT | Mod: PBBFAC,HCNC,, | Performed by: PSYCHIATRY & NEUROLOGY

## 2020-06-09 PROCEDURE — 99499 RISK ADDL DX/OHS AUDIT: ICD-10-PCS | Mod: HCNC,S$GLB,, | Performed by: PSYCHIATRY & NEUROLOGY

## 2020-06-09 PROCEDURE — 99213 PR OFFICE/OUTPT VISIT, EST, LEVL III, 20-29 MIN: ICD-10-PCS | Mod: HCNC,S$GLB,, | Performed by: PSYCHIATRY & NEUROLOGY

## 2020-06-09 PROCEDURE — 1159F MED LIST DOCD IN RCRD: CPT | Mod: HCNC,S$GLB,, | Performed by: PSYCHIATRY & NEUROLOGY

## 2020-06-09 PROCEDURE — 99999 PR PBB SHADOW E&M-EST. PATIENT-LVL II: ICD-10-PCS | Mod: PBBFAC,HCNC,, | Performed by: PSYCHIATRY & NEUROLOGY

## 2020-06-09 PROCEDURE — 99499 UNLISTED E&M SERVICE: CPT | Mod: HCNC,S$GLB,, | Performed by: PSYCHIATRY & NEUROLOGY

## 2020-06-09 RX ORDER — DULOXETIN HYDROCHLORIDE 60 MG/1
60 CAPSULE, DELAYED RELEASE ORAL DAILY
Qty: 30 CAPSULE | Refills: 3 | Status: SHIPPED | OUTPATIENT
Start: 2020-06-09 | End: 2020-10-05 | Stop reason: SDUPTHER

## 2020-06-09 RX ORDER — OLANZAPINE 5 MG/1
7.5 TABLET ORAL NIGHTLY
Qty: 45 TABLET | Refills: 3 | Status: SHIPPED | OUTPATIENT
Start: 2020-06-09 | End: 2020-07-17

## 2020-06-09 RX ORDER — OLANZAPINE 5 MG/1
7.5 TABLET ORAL NIGHTLY
Qty: 45 TABLET | Refills: 3 | Status: SHIPPED | OUTPATIENT
Start: 2020-06-09 | End: 2020-06-09 | Stop reason: SDUPTHER

## 2020-06-09 RX ORDER — MIRTAZAPINE 30 MG/1
30 TABLET, FILM COATED ORAL NIGHTLY
Qty: 30 TABLET | Refills: 3 | Status: SHIPPED | OUTPATIENT
Start: 2020-06-09 | End: 2020-07-17

## 2020-06-09 RX ORDER — DULOXETIN HYDROCHLORIDE 30 MG/1
30 CAPSULE, DELAYED RELEASE ORAL DAILY
Qty: 30 CAPSULE | Refills: 3 | Status: SHIPPED | OUTPATIENT
Start: 2020-06-09 | End: 2020-11-10

## 2020-06-09 NOTE — PROGRESS NOTES
"Outpatient Psychiatry Follow-up Visit (MD/NP)    6/9/2020    Jaimee Mccarthy, a 69 y.o. female, presenting for follow-up visit. Met with patient.    Reason for Encounter: follow-up, paranoia    Interval Hx: Pt seen and interviewed for follow-up, last seen about 6 months ago. Reports feeling generally well. Participating in gardening. Continues sitting with an elderly person 3x/week.   Health generally ok. Moods euthymic. No suspiciousness.   Reports her health is good. Feeling mostly well emotionally. Denies suspiciousness. No new health symptoms. No new mental health symptoms. Will spend the holiday with the family. Adherent to medications. Denies side effects.     background: This 66 y/o F presents to [facility], previously a patient of Dr. Degroot(sp?) who she last saw about 3-4 months ago, but who she says disclosed protected health information to a family member so she is seeking new psychiatrist. Reports current medication regimen includes Cymbalta 90 mg (60 mg +30 mg) every morning, & doxepin 100 mg daily at bedtime. Says this regimen is working well. On 8.9.17 - had ER visit by [facility] after EMS called. EMS noted paranoia - cited that she'd ripped shingles off roof, told police she thought someone had been in house several days before after calling them due to things moved in her home, heard something on her roof & when she checked found vents weren't sealed, "strange wiring", called police. ED physician exam documented that she didn't seem to be hallucinating, delusional or thought-disordered & she was discharged. No SI/HI. No AVH. PsychHx: as above; "Depression with anxiety" - office-based outpt treatment, inpatient treatment - x2-3 (for changing sleep medication in context of highly distressing insomnia); Series of previous antidepressants - reports having when started Cymbalta 90 mg daily, helped moods far more than prior treatments ("when I started it I thought I was reborn"). Doxepin 100 mg qhs. " "Partially helpful for Insomnia; Two prior psychiatrist, previously Dr. Degroot(sp?) Treating her with same medications. Another at [facility], threatened to turn him in for medicaid fraud. 8.9.17 - ER visit by [facility] after EMS called. EMS noted paranoia - cited that she'd ripped shingles off roof, told police she thought someone had been in house several days before after calling them due to things moved in her home, heard something on her roof & when she checked found vents weren't sealed, "strange wiring", called police. No found to be psychotic on ED assessment. No other PEC's though had visit from a state agency in '16 due to concern by an unknown neighbor for "delusional", "acting out at her apartment" (report didn't describe her specific acting out behavior).  had  come out for a wellness check - "someone in the complex said I wasn't taking care of myself", said my "apartment was a wreck".  found her apartment in order, didn't take her in for care. Denies conflicts with others or reasons someone might make a bad shara report. Has been out of that complex x 5-6 months. One remote episode of passive suicidality in '13 when went in for lumpectomy - ended up with mastectomy, + lymph node dissection then learned pathology showed no cancer. FamHx: sister with serious mental illness, son dyslexia. MedHx: Rib Removed. Breast CA dx for which she had mastectomy - reports pathology later indicated she didn't have CA. Cervical CA (s/p cone) - History of cervical cancer many years ago: Basal and squamous cell CA (nares). Hyperlipidemia. Recurrent urinary tract infections. SocialHx: born in [facility], raised in . 3 sibs growing up; much older (18 and 20 years). lives in subsidized disability housing; sister institutionalized. Lived with patient's parents as an adult. On/off meds. Cycle of hospitalizations. Functioned ok on meds, not off. Doesn't know diagnoses. Normal " "socially & academically. 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  x 1 child. Was single mother. Lives in [place].  x 2 (,  36 years). Supportive people "all " - both parents and siblings . Trying to get moved in to house. Trying to volunteer with the food bank. Wants to join [employer]. Social security - recognized mental health disability. Takes medication every day.     Review Of Systems:     GENERAL:  No weight gain or loss  SKIN:  No rashes or lacerations  HEAD:  No headaches  CHEST:  No shortness of breath, hyperventilation or cough  CARDIOVASCULAR:  No tachycardia or chest pain  ABDOMEN:  No nausea, vomiting, pain, constipation or diarrhea  URINARY:  No frequency, dysuria or sexual dysfunction  ENDOCRINE:  No polydipsia, polyuria  MUSCULOSKELETAL:  No pain or stiffness of the joints  NEUROLOGIC:  No weakness, sensory changes, seizures, confusion, memory loss, tremor or other abnormal movements    Current Evaluation:     Nutritional Screening: Considering the patient's height and weight, medications, medical history and preferences, should a referral be made to the dietitian? no    Constitutional  Vitals:  Most recent vital signs, dated less than 90 days prior to this appointment, were not reviewed.    Vitals:    20 1049   BP: (!) 141/100   Pulse: 89   Weight: 69.1 kg (152 lb 5.4 oz)        General:  unremarkable, age appropriate     Musculoskeletal  Muscle Strength/Tone:  no tremor, no tic   Gait & Station:  non-ataxic     Psychiatric  Appearance: casually dressed & groomed;   Behavior: calm,   Cooperation: cooperative with assessment  Speech: normal rate, volume, tone  Thought Process: linear, goal-directed  Thought Content: No suicidal or homicidal ideation; no delusions  Affect: mildly anxious  Mood: mildly anxious  Perceptions: No auditory or visual hallucinations  Level of Consciousness: alert throughout " interview  Insight: fair  Cognition: Oriented to person, place, time, & situation  Memory: no apparent deficits to general clinical interview; not formally assessed  Attention/Concentration: no apparent deficits to general clinical interview; not formally assessed  Fund of Knowledge: average by vocabulary/education    Laboratory Data  No visits with results within 1 Month(s) from this visit.   Latest known visit with results is:   Office Visit on 02/06/2019   Component Date Value Ref Range Status    HPV High Risk type 16, PCR 02/06/2019 Negative  Negative Final    HPV High Risk type 18, PCR 02/06/2019 Negative  Negative Final    HPV other High Risk types, PCR 02/06/2019 Negative  Negative Final     Medications  Outpatient Encounter Medications as of 6/9/2020   Medication Sig Dispense Refill    busPIRone (BUSPAR) 30 MG Tab TAKE 1 TABLET BY MOUTH TWICE DAILY FOR ANXIETY (Patient not taking: Reported on 3/9/2020) 60 tablet 3    DULoxetine (CYMBALTA) 30 MG capsule TAKE 1 CAPSULE BY MOUTH DAILY FOR DEPRESSION. 30 capsule 3    DULoxetine (CYMBALTA) 60 MG capsule TAKE 1 CAPSULE BY MOUTH DAILY FOR DEPRESSION. 30 capsule 3    furosemide (LASIX) 20 MG tablet Take 1 tablet (20 mg total) by mouth as needed. (Patient not taking: Reported on 3/9/2020) 20 tablet 1    mirtazapine (REMERON) 30 MG tablet Take 1 tablet (30 mg total) by mouth every evening. 30 tablet 1    OLANZapine (ZYPREXA) 5 MG tablet Take 1 tablet (5 mg total) by mouth nightly. 30 tablet 3     No facility-administered encounter medications on file as of 6/9/2020.      Assessment - Diagnosis - Goals:     Impression: 70 y/o F with hx of chronic, seeming intermittent paranoia, possibly hallucinations as well. Poor insight into nature of symptoms. Self-identifies her mental health problems as with anxiety, depression for years. Has had a PEC for concerns for paranoia and near-PEC for alleged neglect, hospitalization in 2018 for psychosis. Hasn't tolerated and  "self-discontinued 2 antipsychotics, but tolerates olanazpine ok. Free-floating anxiety unrelated to paranoia intermittently continues to be a problem, better recently. No psychosis recurrence since hospitalization, on antipsychotic. Possible medication-related weight gain is mild thus far.    Dx: psychosis. insomnia, anxiety,    Treatment Goals:  Specify outcomes written in observable, behavioral terms:   Reduce paranoia. Control depression and anxiety symptoms by self-report    Treatment Plan/Recommendations:   · Continue mirtazapine, olanzapine, duloxetine for pain and mood. Observe for paranoia, other symptoms, stop duloxetine if symptoms return. Ms. Briggs to serve as "reality mentor", contact us should she notice Ms. Mccarthy paranoia returning/worsening.   · Discussed risks, benefits, and alternatives to treatment plan documented above with patient. I answered all patient questions related to this plan and patient expressed understanding and agreement.     Return to Clinic: 3 months    VIPIN Chavez MD    "

## 2020-06-18 ENCOUNTER — TELEPHONE (OUTPATIENT)
Dept: PSYCHIATRY | Facility: CLINIC | Age: 70
End: 2020-06-18

## 2020-06-18 NOTE — TELEPHONE ENCOUNTER
Called pt back to let her know I spoke with her pharmacist and they do have her Remeron Rx on file - it was just their oversight and they will fill it now.

## 2020-06-23 ENCOUNTER — OFFICE VISIT (OUTPATIENT)
Dept: FAMILY MEDICINE | Facility: CLINIC | Age: 70
End: 2020-06-23
Payer: MEDICARE

## 2020-06-23 VITALS
DIASTOLIC BLOOD PRESSURE: 68 MMHG | WEIGHT: 152.13 LBS | TEMPERATURE: 99 F | OXYGEN SATURATION: 96 % | SYSTOLIC BLOOD PRESSURE: 130 MMHG | HEART RATE: 94 BPM | BODY MASS INDEX: 26.11 KG/M2

## 2020-06-23 DIAGNOSIS — F41.9 ANXIETY: ICD-10-CM

## 2020-06-23 DIAGNOSIS — G47.00 INSOMNIA, UNSPECIFIED TYPE: ICD-10-CM

## 2020-06-23 DIAGNOSIS — F33.41 RECURRENT MAJOR DEPRESSIVE DISORDER, IN PARTIAL REMISSION: ICD-10-CM

## 2020-06-23 DIAGNOSIS — Z79.899 LONG-TERM USE OF HIGH-RISK MEDICATION: Primary | ICD-10-CM

## 2020-06-23 DIAGNOSIS — F29 PSYCHOSIS, UNSPECIFIED PSYCHOSIS TYPE: ICD-10-CM

## 2020-06-23 DIAGNOSIS — Z12.11 SCREENING FOR COLON CANCER: ICD-10-CM

## 2020-06-23 DIAGNOSIS — Z85.41 HISTORY OF CERVICAL CANCER: ICD-10-CM

## 2020-06-23 PROCEDURE — 99214 OFFICE O/P EST MOD 30 MIN: CPT | Mod: HCNC,S$GLB,, | Performed by: FAMILY MEDICINE

## 2020-06-23 PROCEDURE — 99999 PR PBB SHADOW E&M-EST. PATIENT-LVL III: ICD-10-PCS | Mod: PBBFAC,HCNC,, | Performed by: FAMILY MEDICINE

## 2020-06-23 PROCEDURE — 1126F PR PAIN SEVERITY QUANTIFIED, NO PAIN PRESENT: ICD-10-PCS | Mod: HCNC,S$GLB,, | Performed by: FAMILY MEDICINE

## 2020-06-23 PROCEDURE — 1159F PR MEDICATION LIST DOCUMENTED IN MEDICAL RECORD: ICD-10-PCS | Mod: HCNC,S$GLB,, | Performed by: FAMILY MEDICINE

## 2020-06-23 PROCEDURE — 99214 PR OFFICE/OUTPT VISIT, EST, LEVL IV, 30-39 MIN: ICD-10-PCS | Mod: HCNC,S$GLB,, | Performed by: FAMILY MEDICINE

## 2020-06-23 PROCEDURE — 1126F AMNT PAIN NOTED NONE PRSNT: CPT | Mod: HCNC,S$GLB,, | Performed by: FAMILY MEDICINE

## 2020-06-23 PROCEDURE — 1101F PR PT FALLS ASSESS DOC 0-1 FALLS W/OUT INJ PAST YR: ICD-10-PCS | Mod: HCNC,CPTII,S$GLB, | Performed by: FAMILY MEDICINE

## 2020-06-23 PROCEDURE — 1159F MED LIST DOCD IN RCRD: CPT | Mod: HCNC,S$GLB,, | Performed by: FAMILY MEDICINE

## 2020-06-23 PROCEDURE — 1101F PT FALLS ASSESS-DOCD LE1/YR: CPT | Mod: HCNC,CPTII,S$GLB, | Performed by: FAMILY MEDICINE

## 2020-06-23 PROCEDURE — 99999 PR PBB SHADOW E&M-EST. PATIENT-LVL III: CPT | Mod: PBBFAC,HCNC,, | Performed by: FAMILY MEDICINE

## 2020-06-23 NOTE — PROGRESS NOTES
Subjective:       Patient ID: Jaimee Mccarthy is a 69 y.o. female.    Chief Complaint: No chief complaint on file.      HPI Comments:       Current Outpatient Medications:     busPIRone (BUSPAR) 30 MG Tab, TAKE 1 TABLET BY MOUTH TWICE DAILY FOR ANXIETY, Disp: 60 tablet, Rfl: 3    DULoxetine (CYMBALTA) 30 MG capsule, Take 1 capsule (30 mg total) by mouth once daily., Disp: 30 capsule, Rfl: 3    DULoxetine (CYMBALTA) 60 MG capsule, Take 1 capsule (60 mg total) by mouth once daily., Disp: 30 capsule, Rfl: 3    mirtazapine (REMERON) 30 MG tablet, Take 1 tablet (30 mg total) by mouth every evening., Disp: 30 tablet, Rfl: 3    OLANZapine (ZYPREXA) 5 MG tablet, Take 1.5 tablets (7.5 mg total) by mouth nightly., Disp: 45 tablet, Rfl: 3    furosemide (LASIX) 20 MG tablet, Take 1 tablet (20 mg total) by mouth as needed. (Patient not taking: Reported on 3/9/2020), Disp: 20 tablet, Rfl: 1      She came in today because of some problem she you thought she was having with some of her psychiatric medications per about 2 weeks ago the psychiatrist increase her Zyprexa dose.  In the past this is cause more side effects for her such is movement disturbances.  This time she found that she lost her sense of smell and taste for few days.  No fever chills.  No shortness of breath.  Only a slight cough at nighttime which goes away when she changes position.  No recent sick exposures.  Her sense of smell and taste of since completely returned, and she continues to feel well    Current psychiatric medications include Zyprexa, Remeron, Cymbalta.  I strongly encouraged her to contact her psychiatrist let him know that she has stopped  the higher dose of Zyprexa    Review of Systems   Constitutional: Negative for activity change, appetite change and fever.   HENT: Negative for sore throat.    Respiratory: Negative for cough and shortness of breath.    Cardiovascular: Negative for chest pain.   Gastrointestinal: Negative for abdominal pain,  diarrhea and nausea.   Genitourinary: Negative for difficulty urinating.   Musculoskeletal: Negative for arthralgias and myalgias.   Neurological: Negative for dizziness and headaches.       Objective:      Vitals:    06/23/20 1300   BP: 130/68   Pulse: 94   Temp: 99.3 °F (37.4 °C)   SpO2: 96%   Weight: 69 kg (152 lb 1.9 oz)   PainSc: 0-No pain     Physical Exam  Vitals signs and nursing note reviewed.   Constitutional:       General: She is not in acute distress.     Appearance: She is well-developed. She is not diaphoretic.   HENT:      Head: Normocephalic.      Right Ear: Tympanic membrane, ear canal and external ear normal.      Left Ear: Tympanic membrane, ear canal and external ear normal.      Nose: No congestion or rhinorrhea.      Mouth/Throat:      Mouth: Mucous membranes are moist.      Pharynx: Oropharynx is clear. No oropharyngeal exudate.   Neck:      Musculoskeletal: Neck supple.      Thyroid: No thyromegaly.   Cardiovascular:      Rate and Rhythm: Normal rate and regular rhythm.      Heart sounds: Normal heart sounds. No murmur.   Pulmonary:      Effort: Pulmonary effort is normal.      Breath sounds: Normal breath sounds. No wheezing or rales.   Abdominal:      General: There is no distension.      Palpations: Abdomen is soft.   Lymphadenopathy:      Cervical: No cervical adenopathy.   Skin:     General: Skin is warm and dry.   Neurological:      Mental Status: She is alert and oriented to person, place, and time.   Psychiatric:         Behavior: Behavior normal.         Thought Content: Thought content normal.         Judgment: Judgment normal.       the   Assessment:       1. Long-term use of high-risk medication    2. Anxiety    3. Screening for colon cancer    4. History of cervical cancer    5. Insomnia, unspecified type    6. Psychosis, unspecified psychosis type    7. Recurrent major depressive disorder, in partial remission        Plan:   Long-term use of high-risk medication  -     [facility] auto  differential; Future; Expected date: 06/23/2020  -     Hemoglobin A1C; Future; Expected date: 06/23/2020  -     Lipid Panel; Future; Expected date: 06/23/2020    Anxiety  Comments:  Follow-up Psychiatry  Orders:  -     CBC auto differential; Future; Expected date: 06/23/2020  -     Comprehensive metabolic panel; Future; Expected date: 06/23/2020  -     TSH; Future; Expected date: 06/23/2020    Screening for colon cancer  Comments:  Still declines colonoscopy because of difficulty finding a .  Fit kit cards given  Orders:  -     Fecal Immunochemical Test (iFOBT); Future; Expected date: 06/23/2020    History of cervical cancer  Comments:  Recent Pap normal.  Follow-up with gyn in 2 years    Insomnia, unspecified type  Comments:  Follow-up with psychiatry    Psychosis, unspecified psychosis type  Comments:  With significant paranoid component.  Notify psychiatrist of changes in medication doses    Recurrent major depressive disorder, in partial remission  Comments:  Follow-up with psychiatry

## 2020-06-25 ENCOUNTER — LAB VISIT (OUTPATIENT)
Dept: LAB | Facility: HOSPITAL | Age: 70
End: 2020-06-25
Attending: FAMILY MEDICINE
Payer: MEDICARE

## 2020-06-25 DIAGNOSIS — Z79.899 LONG-TERM USE OF HIGH-RISK MEDICATION: ICD-10-CM

## 2020-06-25 DIAGNOSIS — F41.9 ANXIETY: ICD-10-CM

## 2020-06-25 LAB
ALBUMIN SERPL BCP-MCNC: 3.9 G/DL (ref 3.5–5.2)
ALP SERPL-CCNC: 92 U/L (ref 55–135)
ALT SERPL W/O P-5'-P-CCNC: 25 U/L (ref 10–44)
ANION GAP SERPL CALC-SCNC: 9 MMOL/L (ref 8–16)
AST SERPL-CCNC: 22 U/L (ref 10–40)
BASOPHILS # BLD AUTO: 0.06 K/UL (ref 0–0.2)
BASOPHILS NFR BLD: 0.6 % (ref 0–1.9)
BILIRUB SERPL-MCNC: 0.6 MG/DL (ref 0.1–1)
BUN SERPL-MCNC: 13 MG/DL (ref 8–23)
CALCIUM SERPL-MCNC: 9.5 MG/DL (ref 8.7–10.5)
CHLORIDE SERPL-SCNC: 107 MMOL/L (ref 95–110)
CHOLEST SERPL-MCNC: 222 MG/DL (ref 120–199)
CHOLEST/HDLC SERPL: 5.8 {RATIO} (ref 2–5)
CO2 SERPL-SCNC: 25 MMOL/L (ref 23–29)
CREAT SERPL-MCNC: 0.8 MG/DL (ref 0.5–1.4)
DIFFERENTIAL METHOD: ABNORMAL
EOSINOPHIL # BLD AUTO: 0.4 K/UL (ref 0–0.5)
EOSINOPHIL NFR BLD: 3.2 % (ref 0–8)
ERYTHROCYTE [DISTWIDTH] IN BLOOD BY AUTOMATED COUNT: 13.2 % (ref 11.5–14.5)
EST. GFR  (AFRICAN AMERICAN): >60 ML/MIN/1.73 M^2
EST. GFR  (NON AFRICAN AMERICAN): >60 ML/MIN/1.73 M^2
GLUCOSE SERPL-MCNC: 121 MG/DL (ref 70–110)
HCT VFR BLD AUTO: 40.2 % (ref 37–48.5)
HDLC SERPL-MCNC: 38 MG/DL (ref 40–75)
HDLC SERPL: 17.1 % (ref 20–50)
HGB BLD-MCNC: 13.5 G/DL (ref 12–16)
IMM GRANULOCYTES # BLD AUTO: 0.05 K/UL (ref 0–0.04)
IMM GRANULOCYTES NFR BLD AUTO: 0.5 % (ref 0–0.5)
LDLC SERPL CALC-MCNC: 123.4 MG/DL (ref 63–159)
LYMPHOCYTES # BLD AUTO: 3.4 K/UL (ref 1–4.8)
LYMPHOCYTES NFR BLD: 31 % (ref 18–48)
MCH RBC QN AUTO: 31.4 PG (ref 27–31)
MCHC RBC AUTO-ENTMCNC: 33.6 G/DL (ref 32–36)
MCV RBC AUTO: 94 FL (ref 82–98)
MONOCYTES # BLD AUTO: 1 K/UL (ref 0.3–1)
MONOCYTES NFR BLD: 8.9 % (ref 4–15)
NEUTROPHILS # BLD AUTO: 6.1 K/UL (ref 1.8–7.7)
NEUTROPHILS NFR BLD: 55.8 % (ref 38–73)
NONHDLC SERPL-MCNC: 184 MG/DL
NRBC BLD-RTO: 0 /100 WBC
PLATELET # BLD AUTO: 301 K/UL (ref 150–350)
PMV BLD AUTO: 10.4 FL (ref 9.2–12.9)
POTASSIUM SERPL-SCNC: 4.1 MMOL/L (ref 3.5–5.1)
PROT SERPL-MCNC: 7.4 G/DL (ref 6–8.4)
RBC # BLD AUTO: 4.3 M/UL (ref 4–5.4)
SODIUM SERPL-SCNC: 141 MMOL/L (ref 136–145)
TRIGL SERPL-MCNC: 303 MG/DL (ref 30–150)
TSH SERPL DL<=0.005 MIU/L-ACNC: 1 UIU/ML (ref 0.4–4)
WBC # BLD AUTO: 10.9 K/UL (ref 3.9–12.7)

## 2020-06-25 PROCEDURE — 80061 LIPID PANEL: CPT | Mod: HCNC

## 2020-06-25 PROCEDURE — 36415 COLL VENOUS BLD VENIPUNCTURE: CPT | Mod: HCNC,PO

## 2020-06-25 PROCEDURE — 85025 COMPLETE CBC W/AUTO DIFF WBC: CPT | Mod: HCNC

## 2020-06-25 PROCEDURE — 80053 COMPREHEN METABOLIC PANEL: CPT | Mod: HCNC

## 2020-06-25 PROCEDURE — 83036 HEMOGLOBIN GLYCOSYLATED A1C: CPT | Mod: HCNC

## 2020-06-25 PROCEDURE — 84443 ASSAY THYROID STIM HORMONE: CPT | Mod: HCNC

## 2020-06-26 LAB
ESTIMATED AVG GLUCOSE: 126 MG/DL (ref 68–131)
HBA1C MFR BLD HPLC: 6 % (ref 4–5.6)

## 2020-07-06 ENCOUNTER — TELEPHONE (OUTPATIENT)
Dept: PSYCHIATRY | Facility: CLINIC | Age: 70
End: 2020-07-06

## 2020-07-06 NOTE — TELEPHONE ENCOUNTER
"Returned Ms Mccarthy's call from earlier today.  She called to report side effects after her last medication change - she started taking Olanzapine on 6/9 and took it for two days.  She has had the side effect of "not being able to feel her body, or feeling numb all over"  She does not feel the urge to urinate like like she used to and can only tell when she needs to urinate by a feeling of pressure down there and has urinated on herself.  She would like to know how long this will last and if there is something she could take that would reverse the reaction.  She also claims her recent blood work through her PCP showed results of increase to her triglycerides and also cholesterol and that she has now become pre-diabetic and believes these results are because of the Psych meds she has been taking.  She no longer wants to see Dr Pennington and would like to see Dr Albrecht instead.  I explained department protocol and that I would be back in touch with her in a couple of days.  "

## 2020-07-08 ENCOUNTER — TELEPHONE (OUTPATIENT)
Dept: PSYCHIATRY | Facility: CLINIC | Age: 70
End: 2020-07-08

## 2020-07-08 NOTE — TELEPHONE ENCOUNTER
"Returned call from patient who reported adverse effects of medication - reported to Dr. Gibbs that she'd experienced lost of smell & taste for a few days after starting the higher-dose olanzapine and side effects resolved. She decreased olanzapine to her previous dose. She reports recently reviewing lipids with him and that she discontinued mirtazapine and olanzapine due to this and subsequent experience of "not being able to wake up well" and "not being able to write well the next day" following taking mirtazapine and olanzapine. She has continued to take her other medication. She has asked to switch physicians. I told this would be ok and will discuss with her new psychiatrist when assigned.     C.E.d.  "

## 2020-07-17 ENCOUNTER — TELEPHONE (OUTPATIENT)
Dept: PSYCHIATRY | Facility: CLINIC | Age: 70
End: 2020-07-17

## 2020-07-17 ENCOUNTER — OFFICE VISIT (OUTPATIENT)
Dept: FAMILY MEDICINE | Facility: CLINIC | Age: 70
End: 2020-07-17
Payer: MEDICARE

## 2020-07-17 VITALS
BODY MASS INDEX: 24.99 KG/M2 | WEIGHT: 145.63 LBS | SYSTOLIC BLOOD PRESSURE: 106 MMHG | OXYGEN SATURATION: 96 % | TEMPERATURE: 99 F | HEART RATE: 102 BPM | DIASTOLIC BLOOD PRESSURE: 68 MMHG

## 2020-07-17 DIAGNOSIS — F44.7 CONVERSION DISORDER WITH MIXED SYMPTOMS, PERSISTENT, WITH PSYCHOLOGICAL STRESSOR: Primary | ICD-10-CM

## 2020-07-17 DIAGNOSIS — R35.0 URINARY FREQUENCY: ICD-10-CM

## 2020-07-17 DIAGNOSIS — E78.1 HYPERTRIGLYCERIDEMIA: ICD-10-CM

## 2020-07-17 DIAGNOSIS — R73.03 PREDIABETES: ICD-10-CM

## 2020-07-17 DIAGNOSIS — F29 PSYCHOSIS, UNSPECIFIED PSYCHOSIS TYPE: ICD-10-CM

## 2020-07-17 DIAGNOSIS — F33.41 RECURRENT MAJOR DEPRESSIVE DISORDER, IN PARTIAL REMISSION: ICD-10-CM

## 2020-07-17 DIAGNOSIS — Z79.899 LONG-TERM USE OF HIGH-RISK MEDICATION: ICD-10-CM

## 2020-07-17 LAB
BILIRUB SERPL-MCNC: NEGATIVE MG/DL
BLOOD URINE, POC: NEGATIVE
CLARITY, POC UA: CLEAR
COLOR, POC UA: YELLOW
GLUCOSE UR QL STRIP: NORMAL
KETONES UR QL STRIP: NEGATIVE
LEUKOCYTE ESTERASE URINE, POC: NEGATIVE
NITRITE, POC UA: NEGATIVE
PH, POC UA: 6
PROTEIN, POC: NEGATIVE
SPECIFIC GRAVITY, POC UA: 1
UROBILINOGEN, POC UA: NORMAL

## 2020-07-17 PROCEDURE — 1159F MED LIST DOCD IN RCRD: CPT | Mod: HCNC,S$GLB,, | Performed by: FAMILY MEDICINE

## 2020-07-17 PROCEDURE — 1126F PR PAIN SEVERITY QUANTIFIED, NO PAIN PRESENT: ICD-10-PCS | Mod: HCNC,S$GLB,, | Performed by: FAMILY MEDICINE

## 2020-07-17 PROCEDURE — 99214 OFFICE O/P EST MOD 30 MIN: CPT | Mod: HCNC,25,S$GLB, | Performed by: FAMILY MEDICINE

## 2020-07-17 PROCEDURE — 1159F PR MEDICATION LIST DOCUMENTED IN MEDICAL RECORD: ICD-10-PCS | Mod: HCNC,S$GLB,, | Performed by: FAMILY MEDICINE

## 2020-07-17 PROCEDURE — 99499 RISK ADDL DX/OHS AUDIT: ICD-10-PCS | Mod: HCNC,S$GLB,, | Performed by: FAMILY MEDICINE

## 2020-07-17 PROCEDURE — 1101F PT FALLS ASSESS-DOCD LE1/YR: CPT | Mod: HCNC,CPTII,S$GLB, | Performed by: FAMILY MEDICINE

## 2020-07-17 PROCEDURE — 87086 URINE CULTURE/COLONY COUNT: CPT | Mod: HCNC

## 2020-07-17 PROCEDURE — 99999 PR PBB SHADOW E&M-EST. PATIENT-LVL III: ICD-10-PCS | Mod: PBBFAC,HCNC,, | Performed by: FAMILY MEDICINE

## 2020-07-17 PROCEDURE — 81002 URINALYSIS NONAUTO W/O SCOPE: CPT | Mod: HCNC,S$GLB,, | Performed by: FAMILY MEDICINE

## 2020-07-17 PROCEDURE — 99499 UNLISTED E&M SERVICE: CPT | Mod: HCNC,S$GLB,, | Performed by: FAMILY MEDICINE

## 2020-07-17 PROCEDURE — 1126F AMNT PAIN NOTED NONE PRSNT: CPT | Mod: HCNC,S$GLB,, | Performed by: FAMILY MEDICINE

## 2020-07-17 PROCEDURE — 1101F PR PT FALLS ASSESS DOC 0-1 FALLS W/OUT INJ PAST YR: ICD-10-PCS | Mod: HCNC,CPTII,S$GLB, | Performed by: FAMILY MEDICINE

## 2020-07-17 PROCEDURE — 99999 PR PBB SHADOW E&M-EST. PATIENT-LVL III: CPT | Mod: PBBFAC,HCNC,, | Performed by: FAMILY MEDICINE

## 2020-07-17 PROCEDURE — 99214 PR OFFICE/OUTPT VISIT, EST, LEVL IV, 30-39 MIN: ICD-10-PCS | Mod: HCNC,25,S$GLB, | Performed by: FAMILY MEDICINE

## 2020-07-17 PROCEDURE — 81002 POCT URINE DIPSTICK WITHOUT MICROSCOPE: ICD-10-PCS | Mod: HCNC,S$GLB,, | Performed by: FAMILY MEDICINE

## 2020-07-17 NOTE — TELEPHONE ENCOUNTER
Pt calling to inquire if she has been assigned a new Psychiatrist yet.  She is still having continued med side effect problems and also sleep trouble and would like an appt ASAP.      Please advise.

## 2020-07-17 NOTE — PROGRESS NOTES
Subjective:       Patient ID: Jaimee Mccarthy is a 69 y.o. female.    Chief Complaint: Medication Problem      HPI Comments:       Current Outpatient Medications:     DULoxetine (CYMBALTA) 30 MG capsule, Take 1 capsule (30 mg total) by mouth once daily., Disp: 30 capsule, Rfl: 3    DULoxetine (CYMBALTA) 60 MG capsule, Take 1 capsule (60 mg total) by mouth once daily., Disp: 30 capsule, Rfl: 3      She continues to have concerns about perceived side effects from her psychiatric medications, specifically Zyprexa and Remeron, both of which she has now discontinued for at least 30 days.    Today she tells me that, in addition soon to loss of taste and smell which she mentioned last visit, she now has difficulty feeling anything inside of me.  This includes things like intestinal sensations like gas or hunger also says she has urinary frequency but can not feel her bladder when she urinates.  Denies any dysuria or hematuria.  No fever chills.  No cough.    Has lost 6 lb in the about the last month when I saw her last.  Says her appetite is not good.  She had gain weight on the Remeron.  In addition we discovered at her last visit that she has prediabetes and elevated triglycerides.    She has not had any follow-up conversations with the psychiatry department.  She claims that she is waiting to hear back from them.  She informed that she wanted to see a different psychiatrist because he kept giving these medications even though I told him I was having these problems.  Today encouraged her to call them back to get a another appointment soon.  She says she will do so    Review of Systems   Constitutional: Negative for activity change, appetite change, chills and fever.   HENT: Negative for sore throat.    Respiratory: Negative for cough and shortness of breath.    Cardiovascular: Negative for chest pain.   Gastrointestinal: Negative for abdominal pain, diarrhea and nausea.   Genitourinary: Positive for frequency.  Negative for difficulty urinating, dysuria and hematuria.   Musculoskeletal: Negative for arthralgias and myalgias.   Neurological: Negative for dizziness and headaches.   Psychiatric/Behavioral: The patient is nervous/anxious.        Objective:      Vitals:    07/17/20 0904   BP: 106/68   Pulse: 102   Temp: 99.1 °F (37.3 °C)   TempSrc: Tympanic   SpO2: 96%   Weight: 66 kg (145 lb 9.8 oz)   PainSc: 0-No pain     Physical Exam  Vitals signs and nursing note reviewed.   Constitutional:       General: She is not in acute distress.     Appearance: She is well-developed. She is not diaphoretic.   HENT:      Head: Normocephalic.      Mouth/Throat:      Pharynx: No oropharyngeal exudate.   Neck:      Musculoskeletal: Neck supple.      Thyroid: No thyromegaly.   Cardiovascular:      Rate and Rhythm: Normal rate and regular rhythm.      Heart sounds: Normal heart sounds. No murmur.   Pulmonary:      Effort: Pulmonary effort is normal.      Breath sounds: Normal breath sounds. No wheezing or rales.   Abdominal:      General: There is no distension.      Palpations: Abdomen is soft.   Lymphadenopathy:      Cervical: No cervical adenopathy.   Skin:     General: Skin is warm and dry.   Neurological:      Mental Status: She is alert and oriented to person, place, and time.   Psychiatric:         Behavior: Behavior normal.         Thought Content: Thought content normal.         Judgment: Judgment normal.         Assessment:       1. Conversion disorder with mixed symptoms, persistent, with psychological stressor    2. Hypertriglyceridemia    3. Psychosis, unspecified psychosis type    4. Recurrent major depressive disorder, in partial remission    5. Long-term use of high-risk medication    6. Prediabetes    7. Urinary frequency        Plan:   Conversion disorder with mixed symptoms, persistent, with psychological stressor  Comments:  Currently consists of inability feel internal sensations such as hunger, micturation, taste  and smell.  Could be a psychosis feature    Hypertriglyceridemia  Comments:  Found on recent blood work.  Will need to confirm with another fasting test    Psychosis, unspecified psychosis type  Comments:  Currently off antipsychotics.  Follow-up with psychiatry asap    Recurrent major depressive disorder, in partial remission  Comments:  Discontinued her Remeron.  Still taking Cymbalta.  Needs to follow-up with psychiatry as soon as possible    Long-term use of high-risk medication  Comments:  Discontinued anti psychotic and Remeron on her own.  Still concerned about persistent adverse effects    Prediabetes  Comments:  Has discontinued Zyprexa in the last 30 days    Urinary frequency  Comments:  UA negative  Orders:  -     Urine culture; Future; Expected date: 07/17/2020  -     POCT URINE DIPSTICK WITHOUT MICROSCOPE

## 2020-07-19 LAB — BACTERIA UR CULT: NORMAL

## 2020-07-31 ENCOUNTER — OFFICE VISIT (OUTPATIENT)
Dept: PSYCHIATRY | Facility: CLINIC | Age: 70
End: 2020-07-31
Payer: MEDICARE

## 2020-07-31 DIAGNOSIS — F29 PSYCHOSIS, UNSPECIFIED PSYCHOSIS TYPE: Primary | ICD-10-CM

## 2020-07-31 DIAGNOSIS — F41.9 ANXIETY: ICD-10-CM

## 2020-07-31 DIAGNOSIS — G47.00 INSOMNIA, UNSPECIFIED TYPE: ICD-10-CM

## 2020-07-31 PROCEDURE — 99999 PR PBB SHADOW E&M-EST. PATIENT-LVL I: ICD-10-PCS | Mod: PBBFAC,HCNC,, | Performed by: PSYCHIATRY & NEUROLOGY

## 2020-07-31 PROCEDURE — 99499 RISK ADDL DX/OHS AUDIT: ICD-10-PCS | Mod: HCNC,S$GLB,, | Performed by: PSYCHIATRY & NEUROLOGY

## 2020-07-31 PROCEDURE — 1101F PT FALLS ASSESS-DOCD LE1/YR: CPT | Mod: HCNC,CPTII,S$GLB, | Performed by: PSYCHIATRY & NEUROLOGY

## 2020-07-31 PROCEDURE — 1159F PR MEDICATION LIST DOCUMENTED IN MEDICAL RECORD: ICD-10-PCS | Mod: HCNC,S$GLB,, | Performed by: PSYCHIATRY & NEUROLOGY

## 2020-07-31 PROCEDURE — 99999 PR PBB SHADOW E&M-EST. PATIENT-LVL I: CPT | Mod: PBBFAC,HCNC,, | Performed by: PSYCHIATRY & NEUROLOGY

## 2020-07-31 PROCEDURE — 99214 OFFICE O/P EST MOD 30 MIN: CPT | Mod: HCNC,S$GLB,, | Performed by: PSYCHIATRY & NEUROLOGY

## 2020-07-31 PROCEDURE — 99214 PR OFFICE/OUTPT VISIT, EST, LEVL IV, 30-39 MIN: ICD-10-PCS | Mod: HCNC,S$GLB,, | Performed by: PSYCHIATRY & NEUROLOGY

## 2020-07-31 PROCEDURE — 1159F MED LIST DOCD IN RCRD: CPT | Mod: HCNC,S$GLB,, | Performed by: PSYCHIATRY & NEUROLOGY

## 2020-07-31 PROCEDURE — 99499 UNLISTED E&M SERVICE: CPT | Mod: HCNC,S$GLB,, | Performed by: PSYCHIATRY & NEUROLOGY

## 2020-07-31 PROCEDURE — 1101F PR PT FALLS ASSESS DOC 0-1 FALLS W/OUT INJ PAST YR: ICD-10-PCS | Mod: HCNC,CPTII,S$GLB, | Performed by: PSYCHIATRY & NEUROLOGY

## 2020-07-31 RX ORDER — ZOLPIDEM TARTRATE 5 MG/1
TABLET ORAL
Qty: 30 TABLET | Refills: 1 | Status: SHIPPED | OUTPATIENT
Start: 2020-07-31 | End: 2020-10-02

## 2020-07-31 RX ORDER — ARIPIPRAZOLE 10 MG/1
10 TABLET ORAL DAILY
Qty: 30 TABLET | Refills: 2 | Status: SHIPPED | OUTPATIENT
Start: 2020-07-31 | End: 2020-10-26 | Stop reason: SDUPTHER

## 2020-07-31 RX ORDER — DULOXETIN HYDROCHLORIDE 30 MG/1
30 CAPSULE, DELAYED RELEASE ORAL DAILY
Qty: 30 CAPSULE | Refills: 2 | Status: SHIPPED | OUTPATIENT
Start: 2020-07-31 | End: 2020-10-05 | Stop reason: SDUPTHER

## 2020-07-31 NOTE — PROGRESS NOTES
"Outpatient Psychiatry Follow-up Visit (MD/NP)    7/31/2020    Jaimee Mccarthy, a 69 y.o. female, presenting for follow-up visit. Met with patient.    Reason for Encounter: follow-up, paranoia    Interval Hx: Pt seen and interviewed for follow-up, last seen about 2 months ago. Had side effects with increased dose of olanzapine that she noticed day after starting including "not feeling the urge to urinate", "can't feel my stomach grumble", changes from previously. Had negative general medical evaluation.     background: This 68 y/o F presents to [facility], previously a patient of Dr. Degroot(sp?) who she last saw about 3-4 months ago, but who she says disclosed protected health information to a family member so she is seeking new psychiatrist. Reports current medication regimen includes Cymbalta 90 mg (60 mg +30 mg) every morning, & doxepin 100 mg daily at bedtime. Says this regimen is working well. On 8.9.17 - had ER visit by [facility] after EMS called. EMS noted paranoia - cited that she'd ripped shingles off roof, told police she thought someone had been in house several days before after calling them due to things moved in her home, heard something on her roof & when she checked found vents weren't sealed, "strange wiring", called police. ED physician exam documented that she didn't seem to be hallucinating, delusional or thought-disordered & she was discharged. No SI/HI. No AVH. PsychHx: as above; "Depression with anxiety" - office-based outpt treatment, inpatient treatment - x2-3 (for changing sleep medication in context of highly distressing insomnia); Series of previous antidepressants - reports having when started Cymbalta 90 mg daily, helped moods far more than prior treatments ("when I started it I thought I was reborn"). Doxepin 100 mg qhs. Partially helpful for Insomnia; Two prior psychiatrist, previously Dr. Degroot(sp?) Treating her with same medications. Another at [facility], " "threatened to turn him in for medicaid fraud. 8.9.17 - ER visit by [facility] after EMS called. EMS noted paranoia - cited that she'd ripped shingles off roof, told police she thought someone had been in house several days before after calling them due to things moved in her home, heard something on her roof & when she checked found vents weren't sealed, "strange wiring", called police. No found to be psychotic on ED assessment. No other PEC's though had visit from a state agency in '16 due to concern by an unknown neighbor for "delusional", "acting out at her apartment" (report didn't describe her specific acting out behavior).  had  come out for a wellness check - "someone in the complex said I wasn't taking care of myself", said my "apartment was a wreck".  found her apartment in order, didn't take her in for care. Denies conflicts with others or reasons someone might make a bad shara report. Has been out of that complex x 5-6 months. One remote episode of passive suicidality in '13 when went in for lumpectomy - ended up with mastectomy, + lymph node dissection then learned pathology showed no cancer. FamHx: sister with serious mental illness, son dyslexia. MedHx: Rib Removed. Breast CA dx for which she had mastectomy - reports pathology later indicated she didn't have CA. Cervical CA (s/p cone) - History of cervical cancer many years ago: Basal and squamous cell CA (nares). Hyperlipidemia. Recurrent urinary tract infections. SocialHx: born in [facility], raised in . 3 sibs growing up; much older (18 and 20 years). lives in subsidized disability housing; sister institutionalized. Lived with patient's parents as an adult. On/off meds. Cycle of hospitalizations. Functioned ok on meds, not off. Doesn't know diagnoses. Normal socially & academically. 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  x 1 child. Was single mother. Lives in " [place]  x 2 (,  36 years). Supportive people "all " - both parents and siblings . Trying to get moved in to house. Trying to volunteer with the food bank. Wants to join Islam. Social security - recognized mental health disability. Takes medication every day.     Review Of Systems:     GENERAL:  No weight gain or loss  SKIN:  No rashes or lacerations  HEAD:  No headaches  CHEST:  No shortness of breath, hyperventilation or cough  CARDIOVASCULAR:  No tachycardia or chest pain  ABDOMEN:  No nausea, vomiting, pain, constipation or diarrhea  URINARY:  No frequency, dysuria or sexual dysfunction  ENDOCRINE:  No polydipsia, polyuria  MUSCULOSKELETAL:  No pain or stiffness of the joints  NEUROLOGIC:  No weakness, sensory changes, seizures, confusion, memory loss, tremor or other abnormal movements    Current Evaluation:     Nutritional Screening: Considering the patient's height and weight, medications, medical history and preferences, should a referral be made to the dietitian? no    Constitutional  Vitals:  Most recent vital signs, dated less than 90 days prior to this appointment, were not reviewed.    There were no vitals filed for this visit.     General:  unremarkable, age appropriate     Musculoskeletal  Muscle Strength/Tone:  no tremor, no tic   Gait & Station:  non-ataxic     Psychiatric  Appearance: casually dressed & groomed;   Behavior: calm,   Cooperation: cooperative with assessment  Speech: normal rate, volume, tone  Thought Process: linear, goal-directed  Thought Content: No suicidal or homicidal ideation; no delusions  Affect: mildly anxious  Mood: mildly anxious  Perceptions: No auditory or visual hallucinations  Level of Consciousness: alert throughout interview  Insight: fair  Cognition: Oriented to person, place, time, & situation  Memory: no apparent deficits to general clinical interview; not formally assessed  Attention/Concentration: no apparent deficits to " general clinical interview; not formally assessed  Fund of Knowledge: average by vocabulary/education    Laboratory Data  Office Visit on 07/17/2020   Component Date Value Ref Range Status    Color, UA 07/17/2020 Yellow   Final    Spec Grav UA 07/17/2020 1.000   Final    pH, UA 07/17/2020 6   Final    WBC, UA 07/17/2020 negative   Final    Nitrite, UA 07/17/2020 negative   Final    Protein 07/17/2020 negative   Final    Glucose, UA 07/17/2020 normal   Final    Ketones, UA 07/17/2020 negative   Final    Urobilinogen, UA 07/17/2020 normal   Final    Bilirubin 07/17/2020 negative   Final    Blood, UA 07/17/2020 negative   Final    Clarity, UA 07/17/2020 Clear   Final    Urine Culture, Routine 07/17/2020 No significant growth   Final     Medications  Outpatient Encounter Medications as of 7/31/2020   Medication Sig Dispense Refill    DULoxetine (CYMBALTA) 30 MG capsule Take 1 capsule (30 mg total) by mouth once daily. 30 capsule 3    DULoxetine (CYMBALTA) 60 MG capsule Take 1 capsule (60 mg total) by mouth once daily. 30 capsule 3     No facility-administered encounter medications on file as of 7/31/2020.      Assessment - Diagnosis - Goals:     Impression: 70 y/o F with hx of chronic, seeming intermittent paranoia, possibly hallucinations as well. Poor insight into nature of symptoms. Self-identifies her mental health problems as with anxiety, depression for years. Has had a PEC for concerns for paranoia and near-PEC for alleged neglect, hospitalization in 2018 for psychosis. Hasn't tolerated and self-discontinued 2 antipsychotics, but tolerates olanazpine ok. Free-floating anxiety unrelated to paranoia intermittently continues to be a problem. Recently off antipsychotic due to side effects. Has had unusual she attributed to side effects which haven't resolved or been medically explained.     Dx: psychosis. insomnia, anxiety,    Treatment Goals:  Specify outcomes written in observable, behavioral terms:  "  Reduce paranoia. Control depression and anxiety symptoms by self-report    Treatment Plan/Recommendations:   · Continue duloxetine for pain and mood. Start abilify, zolpidem. Observe for paranoia, other symptoms, stop duloxetine if symptoms return. Ms. Briggs to serve as "reality mentor", contact us should she notice Ms. Mccarthy paranoia returning/worsening.   · Discussed risks, benefits, and alternatives to treatment plan documented above with patient. I answered all patient questions related to this plan and patient expressed understanding and agreement.     Return to Clinic: 3 months    YAEL. Jose Chavez MD    "

## 2020-10-02 ENCOUNTER — IMMUNIZATION (OUTPATIENT)
Dept: FAMILY MEDICINE | Facility: CLINIC | Age: 70
End: 2020-10-02
Payer: MEDICARE

## 2020-10-02 PROCEDURE — 99999 PR PBB SHADOW E&M-EST. PATIENT-LVL II: CPT | Mod: PBBFAC,HCNC,,

## 2020-10-02 PROCEDURE — G0008 FLU VACCINE - QUADRIVALENT - ADJUVANTED: ICD-10-PCS | Mod: HCNC,S$GLB,, | Performed by: FAMILY MEDICINE

## 2020-10-02 PROCEDURE — 90694 FLU VACCINE - QUADRIVALENT - ADJUVANTED: ICD-10-PCS | Mod: HCNC,S$GLB,, | Performed by: FAMILY MEDICINE

## 2020-10-02 PROCEDURE — 99999 PR PBB SHADOW E&M-EST. PATIENT-LVL II: ICD-10-PCS | Mod: PBBFAC,HCNC,,

## 2020-10-02 PROCEDURE — G0008 ADMIN INFLUENZA VIRUS VAC: HCPCS | Mod: HCNC,S$GLB,, | Performed by: FAMILY MEDICINE

## 2020-10-02 PROCEDURE — 90694 VACC AIIV4 NO PRSRV 0.5ML IM: CPT | Mod: HCNC,S$GLB,, | Performed by: FAMILY MEDICINE

## 2020-10-05 RX ORDER — DULOXETIN HYDROCHLORIDE 30 MG/1
30 CAPSULE, DELAYED RELEASE ORAL DAILY
Qty: 30 CAPSULE | Refills: 0 | Status: SHIPPED | OUTPATIENT
Start: 2020-10-05 | End: 2020-10-26 | Stop reason: SDUPTHER

## 2020-10-05 RX ORDER — DULOXETIN HYDROCHLORIDE 60 MG/1
60 CAPSULE, DELAYED RELEASE ORAL DAILY
Qty: 30 CAPSULE | Refills: 0 | Status: SHIPPED | OUTPATIENT
Start: 2020-10-05 | End: 2020-10-26 | Stop reason: SDUPTHER

## 2020-10-26 ENCOUNTER — TELEPHONE (OUTPATIENT)
Dept: PSYCHIATRY | Facility: CLINIC | Age: 70
End: 2020-10-26

## 2020-10-26 ENCOUNTER — OFFICE VISIT (OUTPATIENT)
Dept: PSYCHIATRY | Facility: CLINIC | Age: 70
End: 2020-10-26
Payer: MEDICARE

## 2020-10-26 DIAGNOSIS — R00.2 PALPITATIONS: Primary | ICD-10-CM

## 2020-10-26 PROCEDURE — 99499 RISK ADDL DX/OHS AUDIT: ICD-10-PCS | Mod: S$GLB,,, | Performed by: PSYCHIATRY & NEUROLOGY

## 2020-10-26 PROCEDURE — 99499 UNLISTED E&M SERVICE: CPT | Mod: S$GLB,,, | Performed by: PSYCHIATRY & NEUROLOGY

## 2020-10-26 PROCEDURE — 99999 PR PBB SHADOW E&M-EST. PATIENT-LVL I: CPT | Mod: PBBFAC,HCNC,, | Performed by: PSYCHIATRY & NEUROLOGY

## 2020-10-26 PROCEDURE — 1101F PR PT FALLS ASSESS DOC 0-1 FALLS W/OUT INJ PAST YR: ICD-10-PCS | Mod: HCNC,CPTII,S$GLB, | Performed by: PSYCHIATRY & NEUROLOGY

## 2020-10-26 PROCEDURE — 1159F PR MEDICATION LIST DOCUMENTED IN MEDICAL RECORD: ICD-10-PCS | Mod: HCNC,S$GLB,, | Performed by: PSYCHIATRY & NEUROLOGY

## 2020-10-26 PROCEDURE — 99214 PR OFFICE/OUTPT VISIT, EST, LEVL IV, 30-39 MIN: ICD-10-PCS | Mod: HCNC,S$GLB,, | Performed by: PSYCHIATRY & NEUROLOGY

## 2020-10-26 PROCEDURE — 99214 OFFICE O/P EST MOD 30 MIN: CPT | Mod: HCNC,S$GLB,, | Performed by: PSYCHIATRY & NEUROLOGY

## 2020-10-26 PROCEDURE — 1101F PT FALLS ASSESS-DOCD LE1/YR: CPT | Mod: HCNC,CPTII,S$GLB, | Performed by: PSYCHIATRY & NEUROLOGY

## 2020-10-26 PROCEDURE — 99999 PR PBB SHADOW E&M-EST. PATIENT-LVL I: ICD-10-PCS | Mod: PBBFAC,HCNC,, | Performed by: PSYCHIATRY & NEUROLOGY

## 2020-10-26 PROCEDURE — 1159F MED LIST DOCD IN RCRD: CPT | Mod: HCNC,S$GLB,, | Performed by: PSYCHIATRY & NEUROLOGY

## 2020-10-26 RX ORDER — ARIPIPRAZOLE 10 MG/1
10 TABLET ORAL DAILY
Qty: 30 TABLET | Refills: 2 | Status: SHIPPED | OUTPATIENT
Start: 2020-10-26 | End: 2021-04-23

## 2020-10-26 RX ORDER — DULOXETIN HYDROCHLORIDE 60 MG/1
60 CAPSULE, DELAYED RELEASE ORAL DAILY
Qty: 30 CAPSULE | Refills: 2 | Status: SHIPPED | OUTPATIENT
Start: 2020-10-26 | End: 2021-01-15

## 2020-10-26 RX ORDER — DULOXETIN HYDROCHLORIDE 30 MG/1
30 CAPSULE, DELAYED RELEASE ORAL DAILY
Qty: 30 CAPSULE | Refills: 2 | Status: SHIPPED | OUTPATIENT
Start: 2020-10-26 | End: 2020-12-16

## 2020-10-26 NOTE — TELEPHONE ENCOUNTER
Good afternoon,  decide to have her blood work done with her PCP. She said she will call back to schedule her EKG at [facility] at a later date.

## 2020-10-26 NOTE — PROGRESS NOTES
"Outpatient Psychiatry Follow-up Visit (MD/NP)    10/26/2020    Jaimee Mccarthy, a 69 y.o. female, presenting for follow-up visit. Met with patient.    Reason for Encounter: follow-up, paranoia    Interval Hx: Pt seen and interviewed for follow-up, last seen about 3 months ago. Describes ongoing changes that she first experienced several months ago and attributed to antipsychotics, wonders if she's still having them. Moods mildly depressed. "Don't feel hunger, can't feel the urge to urinate or defecate".  Denies paranoia, avh. Friend moved to [place]. Adherence to duloxetine, unclear/equivocal re: abilify.     Background: This 68 y/o F presents to [facility], previously a patient of Dr. Degroot(sp?) who she last saw about 3-4 months ago, but who she says disclosed protected health information to a family member so she is seeking new psychiatrist. Reports current medication regimen includes Cymbalta 90 mg (60 mg +30 mg) every morning, & doxepin 100 mg daily at bedtime. Says this regimen is working well. On 8.9.17 - had ER visit by [facility] after EMS called. EMS noted paranoia - cited that she'd ripped shingles off roof, told police she thought someone had been in house several days before after calling them due to things moved in her home, heard something on her roof & when she checked found vents weren't sealed, "strange wiring", called police. ED physician exam documented that she didn't seem to be hallucinating, delusional or thought-disordered & she was discharged. No SI/HI. No AVH. PsychHx: as above; "Depression with anxiety" - office-based outpt treatment, inpatient treatment - x2-3 (for changing sleep medication in context of highly distressing insomnia); Series of previous antidepressants - reports having when started Cymbalta 90 mg daily, helped moods far more than prior treatments ("when I started it I thought I was reborn"). Doxepin 100 mg qhs. Partially helpful for Insomnia; Two prior psychiatrist, previously " "Dr. Degroot(sp?) Treating her with same medications. Another at [facility], threatened to turn him in for medicaid fraud. 8.9.17 - ER visit by [facility] after EMS called. EMS noted paranoia - cited that she'd ripped shingles off roof, told police she thought someone had been in house several days before after calling them due to things moved in her home, heard something on her roof & when she checked found vents weren't sealed, "strange wiring", called police. No found to be psychotic on ED assessment. No other PEC's though had visit from a state agency in '16 due to concern by an unknown neighbor for "delusional", "acting out at her apartment" (report didn't describe her specific acting out behavior).  had  come out for a wellness check - "someone in the complex said I wasn't taking care of myself", said my "apartment was a wreck".  found her apartment in order, didn't take her in for care. Denies conflicts with others or reasons someone might make a bad shara report. Has been out of that complex x 5-6 months. One remote episode of passive suicidality in '13 when went in for lumpectomy - ended up with mastectomy, + lymph node dissection then learned pathology showed no cancer. FamHx: sister with serious mental illness, son dyslexia. MedHx: Rib Removed. Breast CA dx for which she had mastectomy - reports pathology later indicated she didn't have CA. Cervical CA (s/p cone) - History of cervical cancer many years ago: Basal and squamous cell CA (nares). Hyperlipidemia. Recurrent urinary tract infections. SocialHx: born in [facility], raised in . 3 sibs growing up; much older (18 and 20 years). lives in subsidized disability housing; sister institutionalized. Lived with patient's parents as an adult. On/off meds. Cycle of hospitalizations. Functioned ok on meds, not off. Doesn't know diagnoses. Normal socially & academically. 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  x 1 child. Was single mother. Lives in [place].  x 2 (,  36 years). Supportive people "all " - both parents and siblings . Trying to get moved in to house. Trying to volunteer with the food bank. Wants to join [employer]. Social security - recognized mental health disability. Takes medication every day.     Review Of Systems:     GENERAL:  No weight gain or loss  SKIN:  No rashes or lacerations  HEAD:  No headaches  CHEST:  No shortness of breath, hyperventilation or cough  CARDIOVASCULAR:  No tachycardia or chest pain  ABDOMEN:  No nausea, vomiting, pain, constipation or diarrhea  URINARY:  No frequency, dysuria or sexual dysfunction  ENDOCRINE:  No polydipsia, polyuria  MUSCULOSKELETAL:  No pain or stiffness of the joints  NEUROLOGIC:  No weakness, sensory changes, seizures, confusion, memory loss, tremor or other abnormal movements    Current Evaluation:     Nutritional Screening: Considering the patient's height and weight, medications, medical history and preferences, should a referral be made to the dietitian? no    Constitutional  Vitals:  Most recent vital signs, dated less than 90 days prior to this appointment, were not reviewed.    There were no vitals filed for this visit.     General:  unremarkable, age appropriate     Musculoskeletal  Muscle Strength/Tone:  no tremor, no tic   Gait & Station:  non-ataxic     Psychiatric  Appearance: casually dressed & groomed;   Behavior: calm,   Cooperation: cooperative with assessment  Speech: normal rate, volume, tone  Thought Process: linear, goal-directed  Thought Content: No suicidal or homicidal ideation; no delusions  Affect: mildly anxious  Mood: mildly anxious  Perceptions: No auditory or visual hallucinations  Level of Consciousness: alert throughout interview  Insight: fair  Cognition: Oriented to person, place, time, & situation  Memory: no apparent deficits to " general clinical interview; not formally assessed  Attention/Concentration: no apparent deficits to general clinical interview; not formally assessed  Fund of Knowledge: average by vocabulary/education    Laboratory Data  No visits with results within 1 Month(s) from this visit.   Latest known visit with results is:   Office Visit on 07/17/2020   Component Date Value Ref Range Status    Color, UA 07/17/2020 Yellow   Final    Spec Grav UA 07/17/2020 1.000   Final    pH, UA 07/17/2020 6   Final    WBC, UA 07/17/2020 negative   Final    Nitrite, UA 07/17/2020 negative   Final    Protein 07/17/2020 negative   Final    Glucose, UA 07/17/2020 normal   Final    Ketones, UA 07/17/2020 negative   Final    Urobilinogen, UA 07/17/2020 normal   Final    Bilirubin 07/17/2020 negative   Final    Blood, UA 07/17/2020 negative   Final    Clarity, UA 07/17/2020 Clear   Final    Urine Culture, Routine 07/17/2020 No significant growth   Final     Medications  Outpatient Encounter Medications as of 10/26/2020   Medication Sig Dispense Refill    ARIPiprazole (ABILIFY) 10 MG Tab Take 1 tablet (10 mg total) by mouth once daily. 30 tablet 2    DULoxetine (CYMBALTA) 30 MG capsule Take 1 capsule (30 mg total) by mouth once daily. 30 capsule 3    DULoxetine (CYMBALTA) 30 MG capsule Take 1 capsule (30 mg total) by mouth once daily. 30 capsule 0    DULoxetine (CYMBALTA) 60 MG capsule Take 1 capsule (60 mg total) by mouth once daily. 30 capsule 0    zolpidem (AMBIEN) 5 MG Tab TAKE 1/2 TO 1 TABLET ONCE DAILY AT BEDTIME AS NEEDED FOR SLEEP. 30 tablet 0     No facility-administered encounter medications on file as of 10/26/2020.      Assessment - Diagnosis - Goals:     Impression: 70 y/o F with hx of chronic, seeming intermittent paranoia, possibly hallucinations as well. Poor insight into nature of symptoms. Self-identifies her mental health problems as with anxiety, depression for years. Has had a [facility] for concerns for paranoia  and near-[facility] for alleged neglect, hospitalization in 2018 for psychosis. Hasn't tolerated and self-discontinued 2 antipsychotics, but tolerated olanazpine ok. Free-floating anxiety unrelated to paranoia intermittently continues to be a problem. Recently off antipsychotic due to side effects. Has had unusual symptoms that she attributed to side effects which haven't resolved or been medically explained.     Dx: hx of psychosis. insomnia, anxiety,    Treatment Goals:  Specify outcomes written in observable, behavioral terms:   Reduce paranoia. Control depression and anxiety symptoms by self-report    Treatment Plan/Recommendations:   · Labs for basic medical assessment. She'll follow-up with PCP.   · psychoeducation regarding side effects. Encouraged antipsychotic adherence.   · Continue duloxetine for pain and mood. abilify 10 mg, zolpidem. Observe for paranoia, other symptoms.   · Discussed risks, benefits, and alternatives to treatment plan documented above with patient. I answered all patient questions related to this plan and patient expressed understanding and agreement.     Return to Clinic: 3 months    VIPIN Chavez MD

## 2020-11-10 ENCOUNTER — LAB VISIT (OUTPATIENT)
Dept: LAB | Facility: HOSPITAL | Age: 70
End: 2020-11-10
Attending: FAMILY MEDICINE
Payer: MEDICARE

## 2020-11-10 ENCOUNTER — OFFICE VISIT (OUTPATIENT)
Dept: FAMILY MEDICINE | Facility: CLINIC | Age: 70
End: 2020-11-10
Payer: MEDICARE

## 2020-11-10 VITALS
TEMPERATURE: 97 F | WEIGHT: 130.38 LBS | HEIGHT: 64 IN | RESPIRATION RATE: 16 BRPM | OXYGEN SATURATION: 99 % | SYSTOLIC BLOOD PRESSURE: 120 MMHG | BODY MASS INDEX: 22.26 KG/M2 | DIASTOLIC BLOOD PRESSURE: 70 MMHG | HEART RATE: 95 BPM

## 2020-11-10 DIAGNOSIS — R73.03 PREDIABETES: ICD-10-CM

## 2020-11-10 DIAGNOSIS — E78.2 MODERATE MIXED HYPERLIPIDEMIA NOT REQUIRING STATIN THERAPY: ICD-10-CM

## 2020-11-10 DIAGNOSIS — E78.1 HYPERTRIGLYCERIDEMIA: ICD-10-CM

## 2020-11-10 DIAGNOSIS — F33.41 RECURRENT MAJOR DEPRESSIVE DISORDER, IN PARTIAL REMISSION: ICD-10-CM

## 2020-11-10 DIAGNOSIS — R00.2 PALPITATIONS: ICD-10-CM

## 2020-11-10 DIAGNOSIS — F29 PSYCHOSIS, UNSPECIFIED PSYCHOSIS TYPE: Primary | ICD-10-CM

## 2020-11-10 DIAGNOSIS — R20.9 SENSORY DISTURBANCE: ICD-10-CM

## 2020-11-10 LAB
BASOPHILS # BLD AUTO: 0.07 K/UL (ref 0–0.2)
BASOPHILS NFR BLD: 0.6 % (ref 0–1.9)
DIFFERENTIAL METHOD: NORMAL
EOSINOPHIL # BLD AUTO: 0.2 K/UL (ref 0–0.5)
EOSINOPHIL NFR BLD: 1.6 % (ref 0–8)
ERYTHROCYTE [DISTWIDTH] IN BLOOD BY AUTOMATED COUNT: 12.9 % (ref 11.5–14.5)
HCT VFR BLD AUTO: 44.1 % (ref 37–48.5)
HGB BLD-MCNC: 14.4 G/DL (ref 12–16)
IMM GRANULOCYTES # BLD AUTO: 0.04 K/UL (ref 0–0.04)
IMM GRANULOCYTES NFR BLD AUTO: 0.3 % (ref 0–0.5)
LYMPHOCYTES # BLD AUTO: 3.4 K/UL (ref 1–4.8)
LYMPHOCYTES NFR BLD: 28.3 % (ref 18–48)
MCH RBC QN AUTO: 30.7 PG (ref 27–31)
MCHC RBC AUTO-ENTMCNC: 32.7 G/DL (ref 32–36)
MCV RBC AUTO: 94 FL (ref 82–98)
MONOCYTES # BLD AUTO: 0.8 K/UL (ref 0.3–1)
MONOCYTES NFR BLD: 7 % (ref 4–15)
NEUTROPHILS # BLD AUTO: 7.4 K/UL (ref 1.8–7.7)
NEUTROPHILS NFR BLD: 62.2 % (ref 38–73)
NRBC BLD-RTO: 0 /100 WBC
PLATELET # BLD AUTO: 315 K/UL (ref 150–350)
PMV BLD AUTO: 10.8 FL (ref 9.2–12.9)
RBC # BLD AUTO: 4.69 M/UL (ref 4–5.4)
WBC # BLD AUTO: 11.95 K/UL (ref 3.9–12.7)

## 2020-11-10 PROCEDURE — 85025 COMPLETE CBC W/AUTO DIFF WBC: CPT | Mod: HCNC

## 2020-11-10 PROCEDURE — 1101F PT FALLS ASSESS-DOCD LE1/YR: CPT | Mod: HCNC,CPTII,S$GLB, | Performed by: FAMILY MEDICINE

## 2020-11-10 PROCEDURE — 1126F AMNT PAIN NOTED NONE PRSNT: CPT | Mod: HCNC,S$GLB,, | Performed by: FAMILY MEDICINE

## 2020-11-10 PROCEDURE — 36415 COLL VENOUS BLD VENIPUNCTURE: CPT | Mod: HCNC,PO

## 2020-11-10 PROCEDURE — 80053 COMPREHEN METABOLIC PANEL: CPT | Mod: HCNC

## 2020-11-10 PROCEDURE — 1101F PR PT FALLS ASSESS DOC 0-1 FALLS W/OUT INJ PAST YR: ICD-10-PCS | Mod: HCNC,CPTII,S$GLB, | Performed by: FAMILY MEDICINE

## 2020-11-10 PROCEDURE — 99214 OFFICE O/P EST MOD 30 MIN: CPT | Mod: HCNC,S$GLB,, | Performed by: FAMILY MEDICINE

## 2020-11-10 PROCEDURE — 83036 HEMOGLOBIN GLYCOSYLATED A1C: CPT | Mod: HCNC

## 2020-11-10 PROCEDURE — 1159F MED LIST DOCD IN RCRD: CPT | Mod: HCNC,S$GLB,, | Performed by: FAMILY MEDICINE

## 2020-11-10 PROCEDURE — 99214 PR OFFICE/OUTPT VISIT, EST, LEVL IV, 30-39 MIN: ICD-10-PCS | Mod: HCNC,S$GLB,, | Performed by: FAMILY MEDICINE

## 2020-11-10 PROCEDURE — 99999 PR PBB SHADOW E&M-EST. PATIENT-LVL IV: CPT | Mod: PBBFAC,HCNC,, | Performed by: FAMILY MEDICINE

## 2020-11-10 PROCEDURE — 3008F PR BODY MASS INDEX (BMI) DOCUMENTED: ICD-10-PCS | Mod: HCNC,CPTII,S$GLB, | Performed by: FAMILY MEDICINE

## 2020-11-10 PROCEDURE — 1159F PR MEDICATION LIST DOCUMENTED IN MEDICAL RECORD: ICD-10-PCS | Mod: HCNC,S$GLB,, | Performed by: FAMILY MEDICINE

## 2020-11-10 PROCEDURE — 3008F BODY MASS INDEX DOCD: CPT | Mod: HCNC,CPTII,S$GLB, | Performed by: FAMILY MEDICINE

## 2020-11-10 PROCEDURE — 1126F PR PAIN SEVERITY QUANTIFIED, NO PAIN PRESENT: ICD-10-PCS | Mod: HCNC,S$GLB,, | Performed by: FAMILY MEDICINE

## 2020-11-10 PROCEDURE — 80061 LIPID PANEL: CPT | Mod: HCNC

## 2020-11-10 PROCEDURE — 99999 PR PBB SHADOW E&M-EST. PATIENT-LVL IV: ICD-10-PCS | Mod: PBBFAC,HCNC,, | Performed by: FAMILY MEDICINE

## 2020-11-10 NOTE — PROGRESS NOTES
Subjective:       Patient ID: Jaimee Mccarthy is a 70 y.o. female.    Chief Complaint: 6 month f/u and referral neurologist      HPI Comments:       Current Outpatient Medications:     ARIPiprazole (ABILIFY) 10 MG Tab, Take 1 tablet (10 mg total) by mouth once daily., Disp: 30 tablet, Rfl: 2    DULoxetine (CYMBALTA) 30 MG capsule, Take 1 capsule (30 mg total) by mouth once daily., Disp: 30 capsule, Rfl: 2    DULoxetine (CYMBALTA) 60 MG capsule, Take 1 capsule (60 mg total) by mouth once daily., Disp: 30 capsule, Rfl: 2    zolpidem (AMBIEN) 5 MG Tab, TAKE 1/2-1 TABLET ONCE DAILY AT BEDTIME AS NEEDED FOR SLEEP, Disp: 30 tablet, Rfl: 1      She came in had of schedule today because she wants to know if I can order some of my labs while she is getting labs ordered by Psychiatry    Also wants to see a neurologist for her sensory disturbances:  Troubles tasting and smelling things appropriately.  Difficulty feeling anything inside of me, like gas or hunger.  Also so she does not feel her bladder when she urinates.  Wants to see Dr. Orosco.    Lost 15 lb since July.  Her weight is gone up been down over the last your so.  Currently taking all of her psychiatric medications including Abilify and Cymbalta.  The latter is 90 mg.  No longer vocalizing any criticism of her care or medications today    Complains of a metallic taste for the last month    Review of Systems   Constitutional: Negative for activity change, appetite change and fever.   HENT: Negative for sore throat.    Respiratory: Negative for cough and shortness of breath.    Cardiovascular: Negative for chest pain.   Gastrointestinal: Negative for abdominal pain, diarrhea and nausea.   Genitourinary: Negative for difficulty urinating.   Musculoskeletal: Negative for arthralgias and myalgias.   Neurological: Negative for dizziness and headaches.       Objective:      Vitals:    11/10/20 0938   BP: 120/70   Pulse: 95   Resp: 16   Temp: 97.2 °F (36.2 °C)   TempSrc:  "Temporal   SpO2: 99%   Weight: 59.1 kg (130 lb 6.4 oz)   Height: 5' 4" (1.626 m)   PainSc: 0-No pain     Physical Exam  Vitals signs and nursing note reviewed.   Constitutional:       General: She is not in acute distress.     Appearance: She is well-developed. She is not diaphoretic.   HENT:      Head: Normocephalic.   Neck:      Musculoskeletal: Neck supple.      Thyroid: No thyromegaly.   Cardiovascular:      Rate and Rhythm: Normal rate and regular rhythm.      Heart sounds: Normal heart sounds. No murmur.   Pulmonary:      Effort: Pulmonary effort is normal.      Breath sounds: Normal breath sounds. No wheezing or rales.   Abdominal:      General: There is no distension.      Palpations: Abdomen is soft.   Lymphadenopathy:      Cervical: No cervical adenopathy.   Skin:     General: Skin is warm and dry.   Neurological:      Mental Status: She is alert and oriented to person, place, and time.   Psychiatric:         Behavior: Behavior normal.         Thought Content: Thought content normal.         Judgment: Judgment normal.         Assessment:       1. Psychosis, unspecified psychosis type    2. Recurrent major depressive disorder, in partial remission    3. Prediabetes    4. Moderate mixed hyperlipidemia not requiring statin therapy    5. Sensory disturbance    6. Hypertriglyceridemia        Plan:   Psychosis, unspecified psychosis type  Comments:  Followed by Psychiatry.  On Abilify    Recurrent major depressive disorder, in partial remission  Comments:  Seems to be doing better.  In good spirits.  No complaints    Prediabetes  Comments:  A1c today  Orders:  -     Hemoglobin A1C; Future; Expected date: 11/10/2020  -     Comprehensive Metabolic Panel; Future; Expected date: 11/10/2020    Moderate mixed hyperlipidemia not requiring statin therapy  Comments:  Fasting lipid profile    Sensory disturbance  Comments:  Psychosis versus conversion disorder.  Patient wants to see neurology  Orders:  -     Ambulatory " referral/consult to Neurology; Future; Expected date: 11/17/2020    Hypertriglyceridemia  -     Lipid Panel; Future; Expected date: 11/10/2020

## 2020-11-11 LAB
ALBUMIN SERPL BCP-MCNC: 4.2 G/DL (ref 3.5–5.2)
ALP SERPL-CCNC: 83 U/L (ref 55–135)
ALT SERPL W/O P-5'-P-CCNC: 19 U/L (ref 10–44)
ANION GAP SERPL CALC-SCNC: 12 MMOL/L (ref 8–16)
ANION GAP SERPL CALC-SCNC: 12 MMOL/L (ref 8–16)
AST SERPL-CCNC: 20 U/L (ref 10–40)
BILIRUB SERPL-MCNC: 0.9 MG/DL (ref 0.1–1)
BUN SERPL-MCNC: 10 MG/DL (ref 8–23)
BUN SERPL-MCNC: 10 MG/DL (ref 8–23)
CALCIUM SERPL-MCNC: 10.3 MG/DL (ref 8.7–10.5)
CALCIUM SERPL-MCNC: 10.3 MG/DL (ref 8.7–10.5)
CHLORIDE SERPL-SCNC: 105 MMOL/L (ref 95–110)
CHLORIDE SERPL-SCNC: 105 MMOL/L (ref 95–110)
CHOLEST SERPL-MCNC: 215 MG/DL (ref 120–199)
CHOLEST/HDLC SERPL: 4.8 {RATIO} (ref 2–5)
CO2 SERPL-SCNC: 25 MMOL/L (ref 23–29)
CO2 SERPL-SCNC: 25 MMOL/L (ref 23–29)
CREAT SERPL-MCNC: 0.8 MG/DL (ref 0.5–1.4)
CREAT SERPL-MCNC: 0.8 MG/DL (ref 0.5–1.4)
EST. GFR  (AFRICAN AMERICAN): >60 ML/MIN/1.73 M^2
EST. GFR  (AFRICAN AMERICAN): >60 ML/MIN/1.73 M^2
EST. GFR  (NON AFRICAN AMERICAN): >60 ML/MIN/1.73 M^2
EST. GFR  (NON AFRICAN AMERICAN): >60 ML/MIN/1.73 M^2
ESTIMATED AVG GLUCOSE: 111 MG/DL (ref 68–131)
GLUCOSE SERPL-MCNC: 106 MG/DL (ref 70–110)
GLUCOSE SERPL-MCNC: 106 MG/DL (ref 70–110)
HBA1C MFR BLD HPLC: 5.5 % (ref 4–5.6)
HDLC SERPL-MCNC: 45 MG/DL (ref 40–75)
HDLC SERPL: 20.9 % (ref 20–50)
LDLC SERPL CALC-MCNC: 128.4 MG/DL (ref 63–159)
NONHDLC SERPL-MCNC: 170 MG/DL
POTASSIUM SERPL-SCNC: 4 MMOL/L (ref 3.5–5.1)
POTASSIUM SERPL-SCNC: 4 MMOL/L (ref 3.5–5.1)
PROT SERPL-MCNC: 7.4 G/DL (ref 6–8.4)
SODIUM SERPL-SCNC: 142 MMOL/L (ref 136–145)
SODIUM SERPL-SCNC: 142 MMOL/L (ref 136–145)
TRIGL SERPL-MCNC: 208 MG/DL (ref 30–150)

## 2020-12-17 ENCOUNTER — TELEPHONE (OUTPATIENT)
Dept: FAMILY MEDICINE | Facility: CLINIC | Age: 70
End: 2020-12-17

## 2020-12-17 DIAGNOSIS — F29 PSYCHOSIS, UNSPECIFIED PSYCHOSIS TYPE: Primary | ICD-10-CM

## 2020-12-17 DIAGNOSIS — F33.41 RECURRENT MAJOR DEPRESSIVE DISORDER, IN PARTIAL REMISSION: ICD-10-CM

## 2020-12-17 NOTE — TELEPHONE ENCOUNTER
[2:27 PM] Mihir Mccarthy's appointment with me next Tuesday is clearly a hold over from a previous visit.  You can give her the option of postponing the follow-up to something like March, since I have seen her recently

## 2020-12-17 NOTE — TELEPHONE ENCOUNTER
S/w pt and informed that she doesn't need to be seen on 12/22/2020 since she was just seen. Informed that she can reschedule to March. Pt will have to call back to reschedule, appt for 12/22/2020 canceled.     Pt stated that she needs a referral to external psychiatry, Dr. Suellen Pierre on [address]. She can't schedule an appt until a referral is sent. Told pt that I would pend referral to Dr. Gibbs to sign and once order is signed and fax I will let her know. Pt verbalized understanding.

## 2020-12-17 NOTE — TELEPHONE ENCOUNTER
MD Deonna France LPN   Caller: Unspecified (Today,  3:04 PM)             Signed, printing, thanks

## 2021-01-25 ENCOUNTER — TELEPHONE (OUTPATIENT)
Dept: ADMINISTRATIVE | Facility: HOSPITAL | Age: 71
End: 2021-01-25

## 2021-01-25 DIAGNOSIS — Z12.39 ENCOUNTER FOR SCREENING FOR MALIGNANT NEOPLASM OF BREAST, UNSPECIFIED SCREENING MODALITY: Primary | ICD-10-CM

## 2021-01-25 DIAGNOSIS — Z12.31 ENCOUNTER FOR SCREENING MAMMOGRAM FOR BREAST CANCER: ICD-10-CM

## 2021-01-26 ENCOUNTER — OFFICE VISIT (OUTPATIENT)
Dept: PSYCHIATRY | Facility: CLINIC | Age: 71
End: 2021-01-26
Payer: MEDICARE

## 2021-01-26 VITALS
WEIGHT: 135.81 LBS | SYSTOLIC BLOOD PRESSURE: 143 MMHG | DIASTOLIC BLOOD PRESSURE: 89 MMHG | BODY MASS INDEX: 23.31 KG/M2 | HEART RATE: 90 BPM

## 2021-01-26 DIAGNOSIS — Z86.59 HISTORY OF PSYCHOSIS: ICD-10-CM

## 2021-01-26 DIAGNOSIS — F41.9 ANXIETY: Primary | ICD-10-CM

## 2021-01-26 DIAGNOSIS — G47.00 INSOMNIA, UNSPECIFIED TYPE: ICD-10-CM

## 2021-01-26 PROCEDURE — 99999 PR PBB SHADOW E&M-EST. PATIENT-LVL II: CPT | Mod: PBBFAC,,, | Performed by: PSYCHIATRY & NEUROLOGY

## 2021-01-26 PROCEDURE — 3008F BODY MASS INDEX DOCD: CPT | Mod: CPTII,S$GLB,, | Performed by: PSYCHIATRY & NEUROLOGY

## 2021-01-26 PROCEDURE — 99999 PR PBB SHADOW E&M-EST. PATIENT-LVL II: ICD-10-PCS | Mod: PBBFAC,,, | Performed by: PSYCHIATRY & NEUROLOGY

## 2021-01-26 PROCEDURE — 99499 RISK ADDL DX/OHS AUDIT: ICD-10-PCS | Mod: S$GLB,,, | Performed by: PSYCHIATRY & NEUROLOGY

## 2021-01-26 PROCEDURE — 99499 UNLISTED E&M SERVICE: CPT | Mod: S$GLB,,, | Performed by: PSYCHIATRY & NEUROLOGY

## 2021-01-26 PROCEDURE — 99214 OFFICE O/P EST MOD 30 MIN: CPT | Mod: S$GLB,,, | Performed by: PSYCHIATRY & NEUROLOGY

## 2021-01-26 PROCEDURE — 1159F PR MEDICATION LIST DOCUMENTED IN MEDICAL RECORD: ICD-10-PCS | Mod: S$GLB,,, | Performed by: PSYCHIATRY & NEUROLOGY

## 2021-01-26 PROCEDURE — 99214 PR OFFICE/OUTPT VISIT, EST, LEVL IV, 30-39 MIN: ICD-10-PCS | Mod: S$GLB,,, | Performed by: PSYCHIATRY & NEUROLOGY

## 2021-01-26 PROCEDURE — 1159F MED LIST DOCD IN RCRD: CPT | Mod: S$GLB,,, | Performed by: PSYCHIATRY & NEUROLOGY

## 2021-01-26 PROCEDURE — 3008F PR BODY MASS INDEX (BMI) DOCUMENTED: ICD-10-PCS | Mod: CPTII,S$GLB,, | Performed by: PSYCHIATRY & NEUROLOGY

## 2021-01-26 RX ORDER — DULOXETIN HYDROCHLORIDE 60 MG/1
60 CAPSULE, DELAYED RELEASE ORAL DAILY
Qty: 30 CAPSULE | Refills: 2 | Status: SHIPPED | OUTPATIENT
Start: 2021-01-26 | End: 2021-05-21 | Stop reason: SDUPTHER

## 2021-01-26 RX ORDER — DULOXETIN HYDROCHLORIDE 30 MG/1
CAPSULE, DELAYED RELEASE ORAL
Qty: 30 CAPSULE | Refills: 2 | Status: SHIPPED | OUTPATIENT
Start: 2021-01-26 | End: 2021-05-21 | Stop reason: SDUPTHER

## 2021-01-26 RX ORDER — ZOLPIDEM TARTRATE 5 MG/1
TABLET ORAL
Qty: 30 TABLET | Refills: 2 | Status: SHIPPED | OUTPATIENT
Start: 2021-01-26 | End: 2021-05-21 | Stop reason: SDUPTHER

## 2021-02-11 ENCOUNTER — HOSPITAL ENCOUNTER (OUTPATIENT)
Dept: RADIOLOGY | Facility: HOSPITAL | Age: 71
Discharge: HOME | End: 2021-02-11
Attending: FAMILY MEDICINE
Payer: MEDICARE

## 2021-02-11 VITALS — HEIGHT: 64 IN | BODY MASS INDEX: 23.18 KG/M2 | WEIGHT: 135.81 LBS

## 2021-02-11 DIAGNOSIS — Z12.31 ENCOUNTER FOR SCREENING MAMMOGRAM FOR BREAST CANCER: ICD-10-CM

## 2021-02-11 PROCEDURE — 77067 MAMMO DIGITAL SCREENING RIGHT WITH TOMO: ICD-10-PCS | Mod: 26,,, | Performed by: RADIOLOGY

## 2021-02-11 PROCEDURE — 77063 MAMMO DIGITAL SCREENING RIGHT WITH TOMO: ICD-10-PCS | Mod: 26,,, | Performed by: RADIOLOGY

## 2021-02-11 PROCEDURE — 77067 SCR MAMMO BI INCL CAD: CPT | Mod: 26,,, | Performed by: RADIOLOGY

## 2021-02-11 PROCEDURE — 77067 SCR MAMMO BI INCL CAD: CPT | Mod: TC

## 2021-02-11 PROCEDURE — 77063 BREAST TOMOSYNTHESIS BI: CPT | Mod: 26,,, | Performed by: RADIOLOGY

## 2021-02-19 ENCOUNTER — TELEPHONE (OUTPATIENT)
Dept: RADIOLOGY | Facility: HOSPITAL | Age: 71
End: 2021-02-19

## 2021-02-22 ENCOUNTER — HOSPITAL ENCOUNTER (OUTPATIENT)
Dept: RADIOLOGY | Facility: HOSPITAL | Age: 71
Discharge: HOME | End: 2021-02-22
Attending: FAMILY MEDICINE
Payer: MEDICARE

## 2021-02-22 DIAGNOSIS — R92.8 ABNORMAL MAMMOGRAM: ICD-10-CM

## 2021-02-22 PROCEDURE — 76642 ULTRASOUND BREAST LIMITED: CPT | Mod: 26,RT,, | Performed by: RADIOLOGY

## 2021-02-22 PROCEDURE — 77065 DX MAMMO INCL CAD UNI: CPT | Mod: 26,RT,, | Performed by: RADIOLOGY

## 2021-02-22 PROCEDURE — 77061 MAMMO DIGITAL DIAGNOSTIC RIGHT WITH TOMO: ICD-10-PCS | Mod: 26,RT,, | Performed by: RADIOLOGY

## 2021-02-22 PROCEDURE — 76642 US BREAST RIGHT LIMITED: ICD-10-PCS | Mod: 26,RT,, | Performed by: RADIOLOGY

## 2021-02-22 PROCEDURE — 76642 ULTRASOUND BREAST LIMITED: CPT | Mod: TC,RT

## 2021-02-22 PROCEDURE — 77065 MAMMO DIGITAL DIAGNOSTIC RIGHT WITH TOMO: ICD-10-PCS | Mod: 26,RT,, | Performed by: RADIOLOGY

## 2021-02-22 PROCEDURE — 77061 BREAST TOMOSYNTHESIS UNI: CPT | Mod: 26,RT,, | Performed by: RADIOLOGY

## 2021-02-22 PROCEDURE — 77061 BREAST TOMOSYNTHESIS UNI: CPT | Mod: TC,RT

## 2021-03-15 ENCOUNTER — OFFICE VISIT (OUTPATIENT)
Dept: OPHTHALMOLOGY | Facility: CLINIC | Age: 71
End: 2021-03-15
Payer: MEDICARE

## 2021-03-15 DIAGNOSIS — H25.13 CATARACT, NUCLEAR SCLEROTIC SENILE, BILATERAL: Primary | ICD-10-CM

## 2021-03-15 DIAGNOSIS — H52.03 HYPEROPIA, BILATERAL: ICD-10-CM

## 2021-03-15 DIAGNOSIS — Z46.0 ENCOUNTER FOR FITTING OR ADJUSTMENT OF SPECTACLES OR CONTACT LENSES: ICD-10-CM

## 2021-03-15 DIAGNOSIS — H52.4 BILATERAL PRESBYOPIA: ICD-10-CM

## 2021-03-15 DIAGNOSIS — Z98.890 HISTORY OF REFRACTIVE SURGERY: ICD-10-CM

## 2021-03-15 PROCEDURE — 92015 DETERMINE REFRACTIVE STATE: CPT | Mod: S$GLB,,, | Performed by: OPTOMETRIST

## 2021-03-15 PROCEDURE — 92014 COMPRE OPH EXAM EST PT 1/>: CPT | Mod: S$GLB,,, | Performed by: OPTOMETRIST

## 2021-03-15 PROCEDURE — 92310 PR CONTACT LENS FITTING (NO CHANGE): ICD-10-PCS | Mod: S$GLB,,, | Performed by: OPTOMETRIST

## 2021-03-15 PROCEDURE — 92015 PR REFRACTION: ICD-10-PCS | Mod: S$GLB,,, | Performed by: OPTOMETRIST

## 2021-03-15 PROCEDURE — 92310 CONTACT LENS FITTING OU: CPT | Mod: S$GLB,,, | Performed by: OPTOMETRIST

## 2021-03-15 PROCEDURE — 92014 PR EYE EXAM, EST PATIENT,COMPREHESV: ICD-10-PCS | Mod: S$GLB,,, | Performed by: OPTOMETRIST

## 2021-04-23 ENCOUNTER — OFFICE VISIT (OUTPATIENT)
Dept: FAMILY MEDICINE | Facility: CLINIC | Age: 71
End: 2021-04-23
Payer: MEDICARE

## 2021-04-23 ENCOUNTER — HOSPITAL ENCOUNTER (OUTPATIENT)
Dept: RADIOLOGY | Facility: HOSPITAL | Age: 71
Discharge: HOME | End: 2021-04-23
Attending: FAMILY MEDICINE
Payer: MEDICARE

## 2021-04-23 VITALS
HEART RATE: 92 BPM | WEIGHT: 139 LBS | OXYGEN SATURATION: 97 % | BODY MASS INDEX: 23.73 KG/M2 | DIASTOLIC BLOOD PRESSURE: 76 MMHG | SYSTOLIC BLOOD PRESSURE: 138 MMHG | HEIGHT: 64 IN | TEMPERATURE: 98 F

## 2021-04-23 DIAGNOSIS — N60.01 BENIGN BREAST CYST IN FEMALE, RIGHT: ICD-10-CM

## 2021-04-23 DIAGNOSIS — M25.551 RIGHT HIP PAIN: ICD-10-CM

## 2021-04-23 DIAGNOSIS — G72.0 STATIN MYOPATHY: ICD-10-CM

## 2021-04-23 DIAGNOSIS — Z85.41 HISTORY OF CERVICAL CANCER: ICD-10-CM

## 2021-04-23 DIAGNOSIS — Z12.11 SCREENING FOR COLON CANCER: ICD-10-CM

## 2021-04-23 DIAGNOSIS — E78.2 MIXED HYPERLIPIDEMIA: ICD-10-CM

## 2021-04-23 DIAGNOSIS — T46.6X5A STATIN MYOPATHY: ICD-10-CM

## 2021-04-23 DIAGNOSIS — F33.41 RECURRENT MAJOR DEPRESSIVE DISORDER, IN PARTIAL REMISSION: ICD-10-CM

## 2021-04-23 DIAGNOSIS — F29 PSYCHOSIS, UNSPECIFIED PSYCHOSIS TYPE: ICD-10-CM

## 2021-04-23 DIAGNOSIS — R73.03 PREDIABETES: ICD-10-CM

## 2021-04-23 DIAGNOSIS — M25.551 RIGHT HIP PAIN: Primary | ICD-10-CM

## 2021-04-23 PROCEDURE — 3288F PR FALLS RISK ASSESSMENT DOCUMENTED: ICD-10-PCS | Mod: CPTII,S$GLB,, | Performed by: FAMILY MEDICINE

## 2021-04-23 PROCEDURE — 99999 PR PBB SHADOW E&M-EST. PATIENT-LVL III: CPT | Mod: PBBFAC,,, | Performed by: FAMILY MEDICINE

## 2021-04-23 PROCEDURE — 3008F PR BODY MASS INDEX (BMI) DOCUMENTED: ICD-10-PCS | Mod: CPTII,S$GLB,, | Performed by: FAMILY MEDICINE

## 2021-04-23 PROCEDURE — 73502 X-RAY EXAM HIP UNI 2-3 VIEWS: CPT | Mod: 26,RT,, | Performed by: RADIOLOGY

## 2021-04-23 PROCEDURE — 3288F FALL RISK ASSESSMENT DOCD: CPT | Mod: CPTII,S$GLB,, | Performed by: FAMILY MEDICINE

## 2021-04-23 PROCEDURE — 99499 RISK ADDL DX/OHS AUDIT: ICD-10-PCS | Mod: S$GLB,,, | Performed by: FAMILY MEDICINE

## 2021-04-23 PROCEDURE — 99214 OFFICE O/P EST MOD 30 MIN: CPT | Mod: S$GLB,,, | Performed by: FAMILY MEDICINE

## 2021-04-23 PROCEDURE — 1159F MED LIST DOCD IN RCRD: CPT | Mod: S$GLB,,, | Performed by: FAMILY MEDICINE

## 2021-04-23 PROCEDURE — 1100F PTFALLS ASSESS-DOCD GE2>/YR: CPT | Mod: CPTII,S$GLB,, | Performed by: FAMILY MEDICINE

## 2021-04-23 PROCEDURE — 99214 PR OFFICE/OUTPT VISIT, EST, LEVL IV, 30-39 MIN: ICD-10-PCS | Mod: S$GLB,,, | Performed by: FAMILY MEDICINE

## 2021-04-23 PROCEDURE — 99999 PR PBB SHADOW E&M-EST. PATIENT-LVL III: ICD-10-PCS | Mod: PBBFAC,,, | Performed by: FAMILY MEDICINE

## 2021-04-23 PROCEDURE — 99499 UNLISTED E&M SERVICE: CPT | Mod: S$GLB,,, | Performed by: FAMILY MEDICINE

## 2021-04-23 PROCEDURE — 73502 XR HIP 2 VIEW RIGHT: ICD-10-PCS | Mod: 26,RT,, | Performed by: RADIOLOGY

## 2021-04-23 PROCEDURE — 1100F PR PT FALLS ASSESS DOC 2+ FALLS/FALL W/INJURY/YR: ICD-10-PCS | Mod: CPTII,S$GLB,, | Performed by: FAMILY MEDICINE

## 2021-04-23 PROCEDURE — 73502 X-RAY EXAM HIP UNI 2-3 VIEWS: CPT | Mod: TC,PO,RT

## 2021-04-23 PROCEDURE — 1159F PR MEDICATION LIST DOCUMENTED IN MEDICAL RECORD: ICD-10-PCS | Mod: S$GLB,,, | Performed by: FAMILY MEDICINE

## 2021-04-23 PROCEDURE — 1126F AMNT PAIN NOTED NONE PRSNT: CPT | Mod: S$GLB,,, | Performed by: FAMILY MEDICINE

## 2021-04-23 PROCEDURE — 3008F BODY MASS INDEX DOCD: CPT | Mod: CPTII,S$GLB,, | Performed by: FAMILY MEDICINE

## 2021-04-23 PROCEDURE — 1126F PR PAIN SEVERITY QUANTIFIED, NO PAIN PRESENT: ICD-10-PCS | Mod: S$GLB,,, | Performed by: FAMILY MEDICINE

## 2021-05-21 ENCOUNTER — OFFICE VISIT (OUTPATIENT)
Dept: PSYCHIATRY | Facility: CLINIC | Age: 71
End: 2021-05-21
Payer: MEDICARE

## 2021-05-21 DIAGNOSIS — G47.00 INSOMNIA, UNSPECIFIED TYPE: ICD-10-CM

## 2021-05-21 DIAGNOSIS — Z86.59 HISTORY OF PSYCHOSIS: ICD-10-CM

## 2021-05-21 DIAGNOSIS — F41.9 ANXIETY: Primary | ICD-10-CM

## 2021-05-21 PROCEDURE — 99213 PR OFFICE/OUTPT VISIT, EST, LEVL III, 20-29 MIN: ICD-10-PCS | Mod: S$GLB,,, | Performed by: PSYCHIATRY & NEUROLOGY

## 2021-05-21 PROCEDURE — 99499 RISK ADDL DX/OHS AUDIT: ICD-10-PCS | Mod: S$GLB,,, | Performed by: PSYCHIATRY & NEUROLOGY

## 2021-05-21 PROCEDURE — 1159F MED LIST DOCD IN RCRD: CPT | Mod: S$GLB,,, | Performed by: PSYCHIATRY & NEUROLOGY

## 2021-05-21 PROCEDURE — 99499 UNLISTED E&M SERVICE: CPT | Mod: S$GLB,,, | Performed by: PSYCHIATRY & NEUROLOGY

## 2021-05-21 PROCEDURE — 99999 PR PBB SHADOW E&M-EST. PATIENT-LVL I: CPT | Mod: PBBFAC,,, | Performed by: PSYCHIATRY & NEUROLOGY

## 2021-05-21 PROCEDURE — 99999 PR PBB SHADOW E&M-EST. PATIENT-LVL I: ICD-10-PCS | Mod: PBBFAC,,, | Performed by: PSYCHIATRY & NEUROLOGY

## 2021-05-21 PROCEDURE — 1159F PR MEDICATION LIST DOCUMENTED IN MEDICAL RECORD: ICD-10-PCS | Mod: S$GLB,,, | Performed by: PSYCHIATRY & NEUROLOGY

## 2021-05-21 PROCEDURE — 99213 OFFICE O/P EST LOW 20 MIN: CPT | Mod: S$GLB,,, | Performed by: PSYCHIATRY & NEUROLOGY

## 2021-05-21 RX ORDER — DULOXETIN HYDROCHLORIDE 30 MG/1
CAPSULE, DELAYED RELEASE ORAL
Qty: 30 CAPSULE | Refills: 3 | Status: SHIPPED | OUTPATIENT
Start: 2021-05-21 | End: 2021-09-21 | Stop reason: SDUPTHER

## 2021-05-21 RX ORDER — DULOXETIN HYDROCHLORIDE 60 MG/1
60 CAPSULE, DELAYED RELEASE ORAL DAILY
Qty: 30 CAPSULE | Refills: 3 | Status: SHIPPED | OUTPATIENT
Start: 2021-05-21 | End: 2021-09-21 | Stop reason: SDUPTHER

## 2021-05-21 RX ORDER — ZOLPIDEM TARTRATE 5 MG/1
TABLET ORAL
Qty: 30 TABLET | Refills: 3 | Status: SHIPPED | OUTPATIENT
Start: 2021-05-21 | End: 2021-09-21 | Stop reason: SDUPTHER

## 2021-06-09 ENCOUNTER — PES CALL (OUTPATIENT)
Dept: ADMINISTRATIVE | Facility: CLINIC | Age: 71
End: 2021-06-09

## 2021-07-01 ENCOUNTER — PATIENT OUTREACH (OUTPATIENT)
Dept: ADMINISTRATIVE | Facility: OTHER | Age: 71
End: 2021-07-01

## 2021-07-01 ENCOUNTER — OFFICE VISIT (OUTPATIENT)
Dept: OBSTETRICS AND GYNECOLOGY | Facility: CLINIC | Age: 71
End: 2021-07-01
Payer: MEDICARE

## 2021-07-01 VITALS
WEIGHT: 136 LBS | SYSTOLIC BLOOD PRESSURE: 132 MMHG | DIASTOLIC BLOOD PRESSURE: 80 MMHG | BODY MASS INDEX: 23.22 KG/M2 | HEIGHT: 64 IN

## 2021-07-01 DIAGNOSIS — R10.2 PELVIC PAIN IN FEMALE: ICD-10-CM

## 2021-07-01 DIAGNOSIS — Z85.41 HISTORY OF CERVICAL CANCER: ICD-10-CM

## 2021-07-01 DIAGNOSIS — Z01.419 WELL WOMAN EXAM WITH ROUTINE GYNECOLOGICAL EXAM: Primary | ICD-10-CM

## 2021-07-01 DIAGNOSIS — Z90.12 STATUS POST LEFT MASTECTOMY: ICD-10-CM

## 2021-07-01 PROCEDURE — 1125F AMNT PAIN NOTED PAIN PRSNT: CPT | Mod: S$GLB,,, | Performed by: OBSTETRICS & GYNECOLOGY

## 2021-07-01 PROCEDURE — 99999 PR PBB SHADOW E&M-EST. PATIENT-LVL III: ICD-10-PCS | Mod: PBBFAC,,, | Performed by: OBSTETRICS & GYNECOLOGY

## 2021-07-01 PROCEDURE — G0101 CA SCREEN;PELVIC/BREAST EXAM: HCPCS | Mod: S$GLB,,, | Performed by: OBSTETRICS & GYNECOLOGY

## 2021-07-01 PROCEDURE — 3008F PR BODY MASS INDEX (BMI) DOCUMENTED: ICD-10-PCS | Mod: CPTII,S$GLB,, | Performed by: OBSTETRICS & GYNECOLOGY

## 2021-07-01 PROCEDURE — G0101 PR CA SCREEN;PELVIC/BREAST EXAM: ICD-10-PCS | Mod: S$GLB,,, | Performed by: OBSTETRICS & GYNECOLOGY

## 2021-07-01 PROCEDURE — 1101F PR PT FALLS ASSESS DOC 0-1 FALLS W/OUT INJ PAST YR: ICD-10-PCS | Mod: CPTII,S$GLB,, | Performed by: OBSTETRICS & GYNECOLOGY

## 2021-07-01 PROCEDURE — 1125F PR PAIN SEVERITY QUANTIFIED, PAIN PRESENT: ICD-10-PCS | Mod: S$GLB,,, | Performed by: OBSTETRICS & GYNECOLOGY

## 2021-07-01 PROCEDURE — 99999 PR PBB SHADOW E&M-EST. PATIENT-LVL III: CPT | Mod: PBBFAC,,, | Performed by: OBSTETRICS & GYNECOLOGY

## 2021-07-01 PROCEDURE — 3288F PR FALLS RISK ASSESSMENT DOCUMENTED: ICD-10-PCS | Mod: CPTII,S$GLB,, | Performed by: OBSTETRICS & GYNECOLOGY

## 2021-07-01 PROCEDURE — 3288F FALL RISK ASSESSMENT DOCD: CPT | Mod: CPTII,S$GLB,, | Performed by: OBSTETRICS & GYNECOLOGY

## 2021-07-01 PROCEDURE — 3008F BODY MASS INDEX DOCD: CPT | Mod: CPTII,S$GLB,, | Performed by: OBSTETRICS & GYNECOLOGY

## 2021-07-01 PROCEDURE — 1101F PT FALLS ASSESS-DOCD LE1/YR: CPT | Mod: CPTII,S$GLB,, | Performed by: OBSTETRICS & GYNECOLOGY

## 2021-07-01 RX ORDER — BACLOFEN 10 MG/1
TABLET ORAL
COMMUNITY
Start: 2021-01-22 | End: 2021-10-22

## 2021-07-01 RX ORDER — DULOXETIN HYDROCHLORIDE 30 MG/1
CAPSULE, DELAYED RELEASE ORAL
COMMUNITY
Start: 2020-11-16 | End: 2021-07-01 | Stop reason: SDUPTHER

## 2021-07-02 ENCOUNTER — TELEPHONE (OUTPATIENT)
Dept: OBSTETRICS AND GYNECOLOGY | Facility: CLINIC | Age: 71
End: 2021-07-02

## 2021-07-02 DIAGNOSIS — R10.2 PELVIC PAIN IN FEMALE: Primary | ICD-10-CM

## 2021-07-02 RX ORDER — CEPHALEXIN 500 MG/1
500 CAPSULE ORAL 4 TIMES DAILY
Qty: 20 CAPSULE | Refills: 0 | Status: SHIPPED | OUTPATIENT
Start: 2021-07-02 | End: 2021-07-07

## 2021-07-06 ENCOUNTER — LAB VISIT (OUTPATIENT)
Dept: LAB | Facility: HOSPITAL | Age: 71
End: 2021-07-06
Attending: OBSTETRICS & GYNECOLOGY
Payer: MEDICARE

## 2021-07-06 DIAGNOSIS — R10.2 PELVIC PAIN IN FEMALE: ICD-10-CM

## 2021-07-06 LAB
BACTERIA #/AREA URNS AUTO: NORMAL /HPF
BILIRUB UR QL STRIP: NEGATIVE
CLARITY UR REFRACT.AUTO: ABNORMAL
COLOR UR AUTO: YELLOW
GLUCOSE UR QL STRIP: NEGATIVE
HGB UR QL STRIP: NEGATIVE
KETONES UR QL STRIP: NEGATIVE
LEUKOCYTE ESTERASE UR QL STRIP: ABNORMAL
MICROSCOPIC COMMENT: NORMAL
NITRITE UR QL STRIP: NEGATIVE
PH UR STRIP: 5 [PH] (ref 5–8)
PROT UR QL STRIP: NEGATIVE
RBC #/AREA URNS AUTO: 0 /HPF (ref 0–4)
SP GR UR STRIP: 1.02 (ref 1–1.03)
SQUAMOUS #/AREA URNS AUTO: 5 /HPF
URN SPEC COLLECT METH UR: ABNORMAL
WBC #/AREA URNS AUTO: 1 /HPF (ref 0–5)

## 2021-07-06 PROCEDURE — 87086 URINE CULTURE/COLONY COUNT: CPT | Performed by: OBSTETRICS & GYNECOLOGY

## 2021-07-06 PROCEDURE — 81001 URINALYSIS AUTO W/SCOPE: CPT | Performed by: OBSTETRICS & GYNECOLOGY

## 2021-07-07 ENCOUNTER — TELEPHONE (OUTPATIENT)
Dept: RADIOLOGY | Facility: HOSPITAL | Age: 71
End: 2021-07-07

## 2021-07-07 LAB — BACTERIA UR CULT: NO GROWTH

## 2021-07-08 ENCOUNTER — HOSPITAL ENCOUNTER (OUTPATIENT)
Dept: RADIOLOGY | Facility: HOSPITAL | Age: 71
Discharge: HOME | End: 2021-07-08
Attending: OBSTETRICS & GYNECOLOGY
Payer: MEDICARE

## 2021-07-08 DIAGNOSIS — R10.2 PELVIC PAIN IN FEMALE: ICD-10-CM

## 2021-07-08 PROCEDURE — 76856 US EXAM PELVIC COMPLETE: CPT | Mod: 26,,, | Performed by: RADIOLOGY

## 2021-07-08 PROCEDURE — 76856 US PELVIS COMP WITH TRANSVAG NON-OB (XPD): ICD-10-PCS | Mod: 26,,, | Performed by: RADIOLOGY

## 2021-07-08 PROCEDURE — 76830 US PELVIS COMP WITH TRANSVAG NON-OB (XPD): ICD-10-PCS | Mod: 26,,, | Performed by: RADIOLOGY

## 2021-07-08 PROCEDURE — 76856 US EXAM PELVIC COMPLETE: CPT | Mod: TC

## 2021-07-08 PROCEDURE — 76830 TRANSVAGINAL US NON-OB: CPT | Mod: 26,,, | Performed by: RADIOLOGY

## 2021-07-09 ENCOUNTER — TELEPHONE (OUTPATIENT)
Dept: OBSTETRICS AND GYNECOLOGY | Facility: HOSPITAL | Age: 71
End: 2021-07-09

## 2021-07-09 DIAGNOSIS — N83.201 CYST OF RIGHT OVARY: Primary | ICD-10-CM

## 2021-07-27 ENCOUNTER — TELEPHONE (OUTPATIENT)
Dept: FAMILY MEDICINE | Facility: CLINIC | Age: 71
End: 2021-07-27

## 2021-07-30 ENCOUNTER — TELEPHONE (OUTPATIENT)
Dept: FAMILY MEDICINE | Facility: CLINIC | Age: 71
End: 2021-07-30

## 2021-08-06 ENCOUNTER — PES CALL (OUTPATIENT)
Dept: ADMINISTRATIVE | Facility: CLINIC | Age: 71
End: 2021-08-06

## 2021-08-09 ENCOUNTER — TELEPHONE (OUTPATIENT)
Dept: RADIOLOGY | Facility: HOSPITAL | Age: 71
End: 2021-08-09

## 2021-08-10 ENCOUNTER — HOSPITAL ENCOUNTER (OUTPATIENT)
Dept: RADIOLOGY | Facility: HOSPITAL | Age: 71
Discharge: HOME | End: 2021-08-10
Attending: OBSTETRICS & GYNECOLOGY
Payer: MEDICARE

## 2021-08-10 DIAGNOSIS — N83.201 CYST OF RIGHT OVARY: ICD-10-CM

## 2021-08-10 PROCEDURE — 76856 US EXAM PELVIC COMPLETE: CPT | Mod: TC

## 2021-08-10 PROCEDURE — 76856 US EXAM PELVIC COMPLETE: CPT | Mod: 26,,, | Performed by: RADIOLOGY

## 2021-08-10 PROCEDURE — 76856 US PELVIS COMP WITH TRANSVAG NON-OB (XPD): ICD-10-PCS | Mod: 26,,, | Performed by: RADIOLOGY

## 2021-08-10 PROCEDURE — 76830 TRANSVAGINAL US NON-OB: CPT | Mod: 26,,, | Performed by: RADIOLOGY

## 2021-08-10 PROCEDURE — 76830 US PELVIS COMP WITH TRANSVAG NON-OB (XPD): ICD-10-PCS | Mod: 26,,, | Performed by: RADIOLOGY

## 2021-08-20 ENCOUNTER — TELEPHONE (OUTPATIENT)
Dept: RADIOLOGY | Facility: HOSPITAL | Age: 71
End: 2021-08-20

## 2021-08-23 ENCOUNTER — HOSPITAL ENCOUNTER (OUTPATIENT)
Dept: RADIOLOGY | Facility: HOSPITAL | Age: 71
Discharge: HOME | End: 2021-08-23
Attending: FAMILY MEDICINE
Payer: MEDICARE

## 2021-08-23 VITALS — WEIGHT: 136 LBS | HEIGHT: 64 IN | BODY MASS INDEX: 23.22 KG/M2

## 2021-08-23 DIAGNOSIS — R92.8 ABNORMAL MAMMOGRAM: ICD-10-CM

## 2021-08-23 PROCEDURE — 76642 ULTRASOUND BREAST LIMITED: CPT | Mod: TC,RT

## 2021-08-23 PROCEDURE — 77061 BREAST TOMOSYNTHESIS UNI: CPT | Mod: TC,RT

## 2021-08-23 PROCEDURE — 77065 DX MAMMO INCL CAD UNI: CPT | Mod: 26,RT,, | Performed by: RADIOLOGY

## 2021-08-23 PROCEDURE — 76642 US BREAST RIGHT LIMITED: ICD-10-PCS | Mod: 26,RT,, | Performed by: RADIOLOGY

## 2021-08-23 PROCEDURE — 77061 MAMMO DIGITAL DIAGNOSTIC RIGHT WITH TOMO: ICD-10-PCS | Mod: 26,RT,, | Performed by: RADIOLOGY

## 2021-08-23 PROCEDURE — 77061 BREAST TOMOSYNTHESIS UNI: CPT | Mod: 26,RT,, | Performed by: RADIOLOGY

## 2021-08-23 PROCEDURE — 76642 ULTRASOUND BREAST LIMITED: CPT | Mod: 26,RT,, | Performed by: RADIOLOGY

## 2021-08-23 PROCEDURE — 77065 MAMMO DIGITAL DIAGNOSTIC RIGHT WITH TOMO: ICD-10-PCS | Mod: 26,RT,, | Performed by: RADIOLOGY

## 2021-09-02 ENCOUNTER — TELEPHONE (OUTPATIENT)
Dept: FAMILY MEDICINE | Facility: CLINIC | Age: 71
End: 2021-09-02

## 2021-09-21 ENCOUNTER — OFFICE VISIT (OUTPATIENT)
Dept: PSYCHIATRY | Facility: CLINIC | Age: 71
End: 2021-09-21
Payer: MEDICARE

## 2021-09-21 DIAGNOSIS — Z86.59 HISTORY OF PSYCHOSIS: ICD-10-CM

## 2021-09-21 DIAGNOSIS — F41.9 ANXIETY: Primary | ICD-10-CM

## 2021-09-21 PROCEDURE — 1159F MED LIST DOCD IN RCRD: CPT | Mod: CPTII,S$GLB,, | Performed by: PSYCHIATRY & NEUROLOGY

## 2021-09-21 PROCEDURE — 99213 PR OFFICE/OUTPT VISIT, EST, LEVL III, 20-29 MIN: ICD-10-PCS | Mod: S$GLB,,, | Performed by: PSYCHIATRY & NEUROLOGY

## 2021-09-21 PROCEDURE — 99499 UNLISTED E&M SERVICE: CPT | Mod: S$GLB,,, | Performed by: PSYCHIATRY & NEUROLOGY

## 2021-09-21 PROCEDURE — 99499 RISK ADDL DX/OHS AUDIT: ICD-10-PCS | Mod: S$GLB,,, | Performed by: PSYCHIATRY & NEUROLOGY

## 2021-09-21 PROCEDURE — 99999 PR PBB SHADOW E&M-EST. PATIENT-LVL I: ICD-10-PCS | Mod: PBBFAC,,, | Performed by: PSYCHIATRY & NEUROLOGY

## 2021-09-21 PROCEDURE — 1160F PR REVIEW ALL MEDS BY PRESCRIBER/CLIN PHARMACIST DOCUMENTED: ICD-10-PCS | Mod: CPTII,S$GLB,, | Performed by: PSYCHIATRY & NEUROLOGY

## 2021-09-21 PROCEDURE — 3044F PR MOST RECENT HEMOGLOBIN A1C LEVEL <7.0%: ICD-10-PCS | Mod: CPTII,S$GLB,, | Performed by: PSYCHIATRY & NEUROLOGY

## 2021-09-21 PROCEDURE — 99213 OFFICE O/P EST LOW 20 MIN: CPT | Mod: S$GLB,,, | Performed by: PSYCHIATRY & NEUROLOGY

## 2021-09-21 PROCEDURE — 1160F RVW MEDS BY RX/DR IN RCRD: CPT | Mod: CPTII,S$GLB,, | Performed by: PSYCHIATRY & NEUROLOGY

## 2021-09-21 PROCEDURE — 1159F PR MEDICATION LIST DOCUMENTED IN MEDICAL RECORD: ICD-10-PCS | Mod: CPTII,S$GLB,, | Performed by: PSYCHIATRY & NEUROLOGY

## 2021-09-21 PROCEDURE — 99999 PR PBB SHADOW E&M-EST. PATIENT-LVL I: CPT | Mod: PBBFAC,,, | Performed by: PSYCHIATRY & NEUROLOGY

## 2021-09-21 PROCEDURE — 3044F HG A1C LEVEL LT 7.0%: CPT | Mod: CPTII,S$GLB,, | Performed by: PSYCHIATRY & NEUROLOGY

## 2021-09-21 RX ORDER — DULOXETIN HYDROCHLORIDE 30 MG/1
CAPSULE, DELAYED RELEASE ORAL
Qty: 30 CAPSULE | Refills: 5 | Status: SHIPPED | OUTPATIENT
Start: 2021-09-21 | End: 2022-03-04 | Stop reason: SDUPTHER

## 2021-09-21 RX ORDER — ZOLPIDEM TARTRATE 5 MG/1
TABLET ORAL
Qty: 30 TABLET | Refills: 3 | Status: SHIPPED | OUTPATIENT
Start: 2021-09-21 | End: 2021-10-22

## 2021-09-21 RX ORDER — DULOXETIN HYDROCHLORIDE 60 MG/1
60 CAPSULE, DELAYED RELEASE ORAL DAILY
Qty: 30 CAPSULE | Refills: 5 | Status: SHIPPED | OUTPATIENT
Start: 2021-09-21 | End: 2022-03-04 | Stop reason: SDUPTHER

## 2021-09-21 RX ORDER — DULOXETIN HYDROCHLORIDE 30 MG/1
CAPSULE, DELAYED RELEASE ORAL
Qty: 30 CAPSULE | Refills: 3 | Status: SHIPPED | OUTPATIENT
Start: 2021-09-21 | End: 2021-09-21 | Stop reason: SDUPTHER

## 2021-09-21 RX ORDER — DULOXETIN HYDROCHLORIDE 60 MG/1
60 CAPSULE, DELAYED RELEASE ORAL DAILY
Qty: 30 CAPSULE | Refills: 3 | Status: SHIPPED | OUTPATIENT
Start: 2021-09-21 | End: 2021-09-21 | Stop reason: SDUPTHER

## 2021-09-23 ENCOUNTER — PATIENT OUTREACH (OUTPATIENT)
Dept: ADMINISTRATIVE | Facility: HOSPITAL | Age: 71
End: 2021-09-23

## 2021-10-22 ENCOUNTER — OFFICE VISIT (OUTPATIENT)
Dept: FAMILY MEDICINE | Facility: CLINIC | Age: 71
End: 2021-10-22
Payer: MEDICARE

## 2021-10-22 VITALS
HEART RATE: 91 BPM | SYSTOLIC BLOOD PRESSURE: 142 MMHG | BODY MASS INDEX: 23.22 KG/M2 | HEIGHT: 64 IN | RESPIRATION RATE: 16 BRPM | WEIGHT: 136 LBS | TEMPERATURE: 98 F | DIASTOLIC BLOOD PRESSURE: 70 MMHG | OXYGEN SATURATION: 97 %

## 2021-10-22 DIAGNOSIS — E78.2 MIXED HYPERLIPIDEMIA: ICD-10-CM

## 2021-10-22 DIAGNOSIS — R73.03 PREDIABETES: ICD-10-CM

## 2021-10-22 DIAGNOSIS — T46.6X5A STATIN MYOPATHY: ICD-10-CM

## 2021-10-22 DIAGNOSIS — F33.41 RECURRENT MAJOR DEPRESSIVE DISORDER, IN PARTIAL REMISSION: ICD-10-CM

## 2021-10-22 DIAGNOSIS — G47.00 INSOMNIA, UNSPECIFIED TYPE: ICD-10-CM

## 2021-10-22 DIAGNOSIS — N60.01 BENIGN BREAST CYST IN FEMALE, RIGHT: ICD-10-CM

## 2021-10-22 DIAGNOSIS — Z00.00 ANNUAL PHYSICAL EXAM: Primary | ICD-10-CM

## 2021-10-22 DIAGNOSIS — G72.0 STATIN MYOPATHY: ICD-10-CM

## 2021-10-22 DIAGNOSIS — F29 PSYCHOSIS, UNSPECIFIED PSYCHOSIS TYPE: ICD-10-CM

## 2021-10-22 PROCEDURE — G0008 ADMIN INFLUENZA VIRUS VAC: HCPCS | Mod: S$GLB,,, | Performed by: FAMILY MEDICINE

## 2021-10-22 PROCEDURE — 1159F MED LIST DOCD IN RCRD: CPT | Mod: CPTII,S$GLB,, | Performed by: FAMILY MEDICINE

## 2021-10-22 PROCEDURE — G0008 FLU VACCINE (QUAD) GREATER THAN OR EQUAL TO 3YO PRESERVATIVE FREE IM: ICD-10-PCS | Mod: S$GLB,,, | Performed by: FAMILY MEDICINE

## 2021-10-22 PROCEDURE — 99499 UNLISTED E&M SERVICE: CPT | Mod: S$GLB,,, | Performed by: FAMILY MEDICINE

## 2021-10-22 PROCEDURE — 3008F PR BODY MASS INDEX (BMI) DOCUMENTED: ICD-10-PCS | Mod: CPTII,S$GLB,, | Performed by: FAMILY MEDICINE

## 2021-10-22 PROCEDURE — 90686 FLU VACCINE (QUAD) GREATER THAN OR EQUAL TO 3YO PRESERVATIVE FREE IM: ICD-10-PCS | Mod: S$GLB,,, | Performed by: FAMILY MEDICINE

## 2021-10-22 PROCEDURE — 1126F AMNT PAIN NOTED NONE PRSNT: CPT | Mod: CPTII,S$GLB,, | Performed by: FAMILY MEDICINE

## 2021-10-22 PROCEDURE — 99999 PR PBB SHADOW E&M-EST. PATIENT-LVL III: CPT | Mod: PBBFAC,,, | Performed by: FAMILY MEDICINE

## 2021-10-22 PROCEDURE — 90686 IIV4 VACC NO PRSV 0.5 ML IM: CPT | Mod: S$GLB,,, | Performed by: FAMILY MEDICINE

## 2021-10-22 PROCEDURE — 99397 PER PM REEVAL EST PAT 65+ YR: CPT | Mod: 25,S$GLB,, | Performed by: FAMILY MEDICINE

## 2021-10-22 PROCEDURE — 99999 PR PBB SHADOW E&M-EST. PATIENT-LVL III: ICD-10-PCS | Mod: PBBFAC,,, | Performed by: FAMILY MEDICINE

## 2021-10-22 PROCEDURE — 1159F PR MEDICATION LIST DOCUMENTED IN MEDICAL RECORD: ICD-10-PCS | Mod: CPTII,S$GLB,, | Performed by: FAMILY MEDICINE

## 2021-10-22 PROCEDURE — 3044F HG A1C LEVEL LT 7.0%: CPT | Mod: CPTII,S$GLB,, | Performed by: FAMILY MEDICINE

## 2021-10-22 PROCEDURE — 3077F PR MOST RECENT SYSTOLIC BLOOD PRESSURE >= 140 MM HG: ICD-10-PCS | Mod: CPTII,S$GLB,, | Performed by: FAMILY MEDICINE

## 2021-10-22 PROCEDURE — 3078F PR MOST RECENT DIASTOLIC BLOOD PRESSURE < 80 MM HG: ICD-10-PCS | Mod: CPTII,S$GLB,, | Performed by: FAMILY MEDICINE

## 2021-10-22 PROCEDURE — 3008F BODY MASS INDEX DOCD: CPT | Mod: CPTII,S$GLB,, | Performed by: FAMILY MEDICINE

## 2021-10-22 PROCEDURE — 1126F PR PAIN SEVERITY QUANTIFIED, NO PAIN PRESENT: ICD-10-PCS | Mod: CPTII,S$GLB,, | Performed by: FAMILY MEDICINE

## 2021-10-22 PROCEDURE — 3077F SYST BP >= 140 MM HG: CPT | Mod: CPTII,S$GLB,, | Performed by: FAMILY MEDICINE

## 2021-10-22 PROCEDURE — 99397 PR PREVENTIVE VISIT,EST,65 & OVER: ICD-10-PCS | Mod: 25,S$GLB,, | Performed by: FAMILY MEDICINE

## 2021-10-22 PROCEDURE — 3078F DIAST BP <80 MM HG: CPT | Mod: CPTII,S$GLB,, | Performed by: FAMILY MEDICINE

## 2021-10-22 PROCEDURE — 99499 RISK ADDL DX/OHS AUDIT: ICD-10-PCS | Mod: S$GLB,,, | Performed by: FAMILY MEDICINE

## 2021-10-22 PROCEDURE — 3044F PR MOST RECENT HEMOGLOBIN A1C LEVEL <7.0%: ICD-10-PCS | Mod: CPTII,S$GLB,, | Performed by: FAMILY MEDICINE

## 2021-10-27 ENCOUNTER — TELEPHONE (OUTPATIENT)
Dept: PSYCHIATRY | Facility: CLINIC | Age: 71
End: 2021-10-27
Payer: MEDICARE

## 2021-11-19 ENCOUNTER — LAB VISIT (OUTPATIENT)
Dept: LAB | Facility: HOSPITAL | Age: 71
End: 2021-11-19
Attending: FAMILY MEDICINE
Payer: MEDICARE

## 2021-11-19 DIAGNOSIS — R73.03 PREDIABETES: ICD-10-CM

## 2021-11-19 DIAGNOSIS — E78.2 MIXED HYPERLIPIDEMIA: ICD-10-CM

## 2021-11-19 PROCEDURE — 80061 LIPID PANEL: CPT | Mod: HCNC | Performed by: FAMILY MEDICINE

## 2021-11-19 PROCEDURE — 36415 COLL VENOUS BLD VENIPUNCTURE: CPT | Mod: HCNC,PO | Performed by: FAMILY MEDICINE

## 2021-11-19 PROCEDURE — 83036 HEMOGLOBIN GLYCOSYLATED A1C: CPT | Mod: HCNC | Performed by: FAMILY MEDICINE

## 2021-11-20 LAB
CHOLEST SERPL-MCNC: 233 MG/DL (ref 120–199)
CHOLEST/HDLC SERPL: 4.6 {RATIO} (ref 2–5)
ESTIMATED AVG GLUCOSE: 120 MG/DL (ref 68–131)
HBA1C MFR BLD: 5.8 % (ref 4–5.6)
HDLC SERPL-MCNC: 51 MG/DL (ref 40–75)
HDLC SERPL: 21.9 % (ref 20–50)
LDLC SERPL CALC-MCNC: 150 MG/DL (ref 63–159)
NONHDLC SERPL-MCNC: 182 MG/DL
TRIGL SERPL-MCNC: 160 MG/DL (ref 30–150)

## 2022-01-12 ENCOUNTER — TELEPHONE (OUTPATIENT)
Dept: FAMILY MEDICINE | Facility: CLINIC | Age: 72
End: 2022-01-12
Payer: MEDICARE

## 2022-01-12 RX ORDER — PRAVASTATIN SODIUM 20 MG/1
20 TABLET ORAL DAILY
Qty: 90 TABLET | Refills: 3 | Status: SHIPPED | OUTPATIENT
Start: 2022-01-12 | End: 2022-04-22

## 2022-01-12 NOTE — TELEPHONE ENCOUNTER
Pt stated she has tried cholesterol medication before and was on lipitor and it did not work. Pt is will to try something else. Pt wants something with the least side effects. Call into [facility].

## 2022-03-04 ENCOUNTER — OFFICE VISIT (OUTPATIENT)
Dept: PSYCHIATRY | Facility: CLINIC | Age: 72
End: 2022-03-04
Payer: MEDICARE

## 2022-03-04 DIAGNOSIS — Z86.59 HISTORY OF PSYCHOSIS: ICD-10-CM

## 2022-03-04 DIAGNOSIS — F41.9 ANXIETY: Primary | ICD-10-CM

## 2022-03-04 PROCEDURE — 99213 OFFICE O/P EST LOW 20 MIN: CPT | Mod: S$GLB,,, | Performed by: PSYCHIATRY & NEUROLOGY

## 2022-03-04 PROCEDURE — 99213 PR OFFICE/OUTPT VISIT, EST, LEVL III, 20-29 MIN: ICD-10-PCS | Mod: S$GLB,,, | Performed by: PSYCHIATRY & NEUROLOGY

## 2022-03-04 RX ORDER — DULOXETIN HYDROCHLORIDE 60 MG/1
60 CAPSULE, DELAYED RELEASE ORAL DAILY
Qty: 30 CAPSULE | Refills: 5 | Status: SHIPPED | OUTPATIENT
Start: 2022-03-04 | End: 2022-03-28

## 2022-03-04 RX ORDER — DULOXETIN HYDROCHLORIDE 30 MG/1
CAPSULE, DELAYED RELEASE ORAL
Qty: 30 CAPSULE | Refills: 5 | Status: SHIPPED | OUTPATIENT
Start: 2022-03-04 | End: 2022-03-28

## 2022-03-04 NOTE — PROGRESS NOTES
"Outpatient Psychiatry Follow-up Visit (MD/NP)    3/4/2022    Jaimee Mccarthy, a 71 y.o. female, presenting for follow-up visit. Met with patient.    Reason for Encounter: follow-up, paranoia    Interval Hx: Pt seen and interviewed for follow-up, last seen about 4 months ago. Reports mostly euthymic since last visit. No new health problems. No new medications.   No side effects. No discontinuation effects. Social situation is stable. No recurrence of paranoia. No new somatic complaints. Has been gaining weight again after returned to previous diet after weight loss following dietary changes.     Background: This 66 y/o F presents to [facility], previously a patient of Dr. Degroot(sp?) who she last saw about 3-4 months ago, but who she says disclosed protected health information to a family member so she is seeking new psychiatrist. Reports current medication regimen includes Cymbalta 90 mg (60 mg +30 mg) every morning, & doxepin 100 mg daily at bedtime. Says this regimen is working well. On 8.9.17 - had ER visit by [facility] after EMS called. EMS noted paranoia - cited that she'd ripped shingles off roof, told police she thought someone had been in house several days before after calling them due to things moved in her home, heard something on her roof & when she checked found vents weren't sealed, "strange wiring", called police. ED physician exam documented that she didn't seem to be hallucinating, delusional or thought-disordered & she was discharged. No SI/HI. No AVH. PsychHx: as above; "Depression with anxiety" - office-based outpt treatment, inpatient treatment - x2-3 (for changing sleep medication in context of highly distressing insomnia); Series of previous antidepressants - reports having when started Cymbalta 90 mg daily, helped moods far more than prior treatments ("when I started it I thought I was reborn"). Doxepin 100 mg qhs. Partially helpful for Insomnia; Two prior psychiatrist, previously Dr." "Mikayla(sp?) Treating her with same medications. Another at [facility], threatened to turn him in for medicaid fraud. 8.9.17 - ER visit by [facility] after EMS called. EMS noted paranoia - cited that she'd ripped shingles off roof, told police she thought someone had been in house several days before after calling them due to things moved in her home, heard something on her roof & when she checked found vents weren't sealed, "strange wiring", called police. No found to be psychotic on ED assessment. No other PEC's though had visit from a state agency in '16 due to concern by an unknown neighbor for "delusional", "acting out at her apartment" (report didn't describe her specific acting out behavior).  had  come out for a wellness check - "someone in the complex said I wasn't taking care of myself", said my "apartment was a wreck".  found her apartment in order, didn't take her in for care. Denies conflicts with others or reasons someone might make a bad shara report. Has been out of that complex x 5-6 months. One remote episode of passive suicidality in '13 when went in for lumpectomy - ended up with mastectomy, + lymph node dissection then learned pathology showed no cancer. FamHx: sister with serious mental illness, son dyslexia. MedHx: Rib Removed. Breast CA dx for which she had mastectomy - reports pathology later indicated she didn't have CA. Cervical CA (s/p cone) - History of cervical cancer many years ago: Basal and squamous cell CA (nares). Hyperlipidemia. Recurrent urinary tract infections. SocialHx: born in [facility], raised in . 3 sibs growing up; much older (18 and 20 years). lives in subsidized disability housing; sister institutionalized. Lived with patient's parents as an adult. On/off meds. Cycle of hospitalizations. Functioned ok on meds, not off. Doesn't know diagnoses. Normal socially & academically. 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 Moved to [place] to be with son.  x 1 child. Was single mother. Lives in [place].  x 2 (,  36 years). Supportive people "all " - both parents and siblings . Trying to get moved in to house. Trying to volunteer with the food bank. Wants to join [employer]. Social security - recognized mental health disability. Takes medication every day.     Review Of Systems:     GENERAL:  No weight gain or loss  SKIN:  No rashes or lacerations  HEAD:  No headaches  CHEST:  No shortness of breath, hyperventilation or cough  CARDIOVASCULAR:  No tachycardia or chest pain  ABDOMEN:  No nausea, vomiting, pain, constipation or diarrhea  URINARY:  No frequency, dysuria or sexual dysfunction  ENDOCRINE:  No polydipsia, polyuria  MUSCULOSKELETAL:  No pain or stiffness of the joints  NEUROLOGIC:  No weakness, sensory changes, seizures, confusion, memory loss, tremor or other abnormal movements    Current Evaluation:     Nutritional Screening: Considering the patient's height and weight, medications, medical history and preferences, should a referral be made to the dietitian? no    Constitutional  Vitals:  Most recent vital signs, dated less than 90 days prior to this appointment, were not reviewed.    There were no vitals filed for this visit.     General:  unremarkable, age appropriate     Musculoskeletal  Muscle Strength/Tone:  no tremor, no tic   Gait & Station:  non-ataxic     Psychiatric  Appearance: casually dressed & groomed;   Behavior: calm,   Cooperation: cooperative with assessment  Speech: normal rate, volume, tone  Thought Process: linear, goal-directed  Thought Content: No suicidal or homicidal ideation; no delusions  Affect: mildly anxious  Mood: mildly anxious  Perceptions: No auditory or visual hallucinations  Level of Consciousness: alert throughout interview  Insight: fair  Cognition: Oriented to person, place, time, & situation  Memory: no apparent deficits to general " clinical interview; not formally assessed  Attention/Concentration: no apparent deficits to general clinical interview; not formally assessed  Fund of Knowledge: average by vocabulary/education    Laboratory Data  No visits with results within 1 Month(s) from this visit.   Latest known visit with results is:   Lab Visit on 11/19/2021   Component Date Value Ref Range Status    Hemoglobin A1C 11/19/2021 5.8 (A) 4.0 - 5.6 % Final    Estimated Avg Glucose 11/19/2021 120  68 - 131 mg/dL Final    Cholesterol 11/19/2021 233 (A) 120 - 199 mg/dL Final    Triglycerides 11/19/2021 160 (A) 30 - 150 mg/dL Final    HDL 11/19/2021 51  40 - 75 mg/dL Final    LDL Cholesterol 11/19/2021 150.0  63.0 - 159.0 mg/dL Final    HDL/Cholesterol Ratio 11/19/2021 21.9  20.0 - 50.0 % Final    Total Cholesterol/HDL Ratio 11/19/2021 4.6  2.0 - 5.0 Final    Non-HDL Cholesterol 11/19/2021 182  mg/dL Final     Medications  Outpatient Encounter Medications as of 3/4/2022   Medication Sig Dispense Refill    DULoxetine (CYMBALTA) 30 MG capsule TAKE 1 CAPSULE BY MOUTH DAILY FOR DEPRESSION. 30 capsule 5    DULoxetine (CYMBALTA) 60 MG capsule Take 1 capsule (60 mg total) by mouth once daily. 30 capsule 5    pravastatin (PRAVACHOL) 20 MG tablet Take 1 tablet (20 mg total) by mouth once daily. 90 tablet 3     No facility-administered encounter medications on file as of 3/4/2022.     Assessment - Diagnosis - Goals:     Impression: 69 y/o F with hx of chronic, seeming intermittent paranoia, possibly hallucinations as well. Poor insight into nature of symptoms. Self-identifies her mental health problems as with anxiety, depression for years. Has had a PEC for concerns for paranoia and near-PEC for alleged neglect, hospitalization in 2018 for psychosis. Didn't tolerate & self-discontinued 2 antipsychotics, but tolerated olanazpine. Free-floating anxiety unrelated to paranoia intermittently continued to be a problem. Recently off antipsychotic due to  side effects thus far without return of psychosis. Diagnosed with idiopathic neuropathy with mild symptoms.     Dx: hx of psychosis. insomnia, anxiety,    Treatment Goals:  Specify outcomes written in observable, behavioral terms:   Monitory for paranoia. Control depression and anxiety symptoms by self-report    Treatment Plan/Recommendations:      · Continue duloxetine for pain and mood. She's off antipsychotic. Observe for paranoia, other symptoms.   · Discussed risks, benefits, and alternatives to treatment plan documented above with patient. I answered all patient questions related to this plan and patient expressed understanding and agreement.     Return to Clinic: 3 months    VIPIN Chavez MD

## 2022-04-21 PROBLEM — C50.919 MALIGNANT NEOPLASM OF FEMALE BREAST, UNSPECIFIED ESTROGEN RECEPTOR STATUS, UNSPECIFIED LATERALITY, UNSPECIFIED SITE OF BREAST: Status: ACTIVE | Noted: 2022-04-21

## 2022-04-21 PROBLEM — F33.41 RECURRENT MAJOR DEPRESSIVE DISORDER, IN PARTIAL REMISSION: Status: ACTIVE | Noted: 2022-04-21

## 2022-04-22 ENCOUNTER — OFFICE VISIT (OUTPATIENT)
Dept: FAMILY MEDICINE | Facility: CLINIC | Age: 72
End: 2022-04-22
Payer: MEDICARE

## 2022-04-22 VITALS
HEIGHT: 64 IN | BODY MASS INDEX: 23.26 KG/M2 | WEIGHT: 136.25 LBS | TEMPERATURE: 97 F | OXYGEN SATURATION: 96 % | DIASTOLIC BLOOD PRESSURE: 70 MMHG | HEART RATE: 89 BPM | RESPIRATION RATE: 16 BRPM | SYSTOLIC BLOOD PRESSURE: 120 MMHG

## 2022-04-22 DIAGNOSIS — E78.2 MIXED HYPERLIPIDEMIA: ICD-10-CM

## 2022-04-22 DIAGNOSIS — M85.80 OSTEOPENIA, UNSPECIFIED LOCATION: ICD-10-CM

## 2022-04-22 DIAGNOSIS — T46.6X5A STATIN MYOPATHY: ICD-10-CM

## 2022-04-22 DIAGNOSIS — Z12.31 ENCOUNTER FOR SCREENING MAMMOGRAM FOR MALIGNANT NEOPLASM OF BREAST: ICD-10-CM

## 2022-04-22 DIAGNOSIS — G72.0 STATIN MYOPATHY: ICD-10-CM

## 2022-04-22 DIAGNOSIS — Z78.0 POSTMENOPAUSAL: ICD-10-CM

## 2022-04-22 DIAGNOSIS — R73.03 PREDIABETES: ICD-10-CM

## 2022-04-22 DIAGNOSIS — Z12.31 ENCOUNTER FOR SCREENING MAMMOGRAM FOR MALIGNANT NEOPLASM OF BREAST: Primary | ICD-10-CM

## 2022-04-22 DIAGNOSIS — Z85.41 HISTORY OF CERVICAL CANCER: ICD-10-CM

## 2022-04-22 DIAGNOSIS — F33.41 RECURRENT MAJOR DEPRESSIVE DISORDER, IN PARTIAL REMISSION: ICD-10-CM

## 2022-04-22 PROCEDURE — 3008F BODY MASS INDEX DOCD: CPT | Mod: CPTII,S$GLB,, | Performed by: FAMILY MEDICINE

## 2022-04-22 PROCEDURE — 99999 PR PBB SHADOW E&M-EST. PATIENT-LVL III: CPT | Mod: PBBFAC,,, | Performed by: FAMILY MEDICINE

## 2022-04-22 PROCEDURE — 99214 OFFICE O/P EST MOD 30 MIN: CPT | Mod: S$GLB,,, | Performed by: FAMILY MEDICINE

## 2022-04-22 PROCEDURE — 3008F PR BODY MASS INDEX (BMI) DOCUMENTED: ICD-10-PCS | Mod: CPTII,S$GLB,, | Performed by: FAMILY MEDICINE

## 2022-04-22 PROCEDURE — 3078F DIAST BP <80 MM HG: CPT | Mod: CPTII,S$GLB,, | Performed by: FAMILY MEDICINE

## 2022-04-22 PROCEDURE — 1159F PR MEDICATION LIST DOCUMENTED IN MEDICAL RECORD: ICD-10-PCS | Mod: CPTII,S$GLB,, | Performed by: FAMILY MEDICINE

## 2022-04-22 PROCEDURE — 99999 PR PBB SHADOW E&M-EST. PATIENT-LVL III: ICD-10-PCS | Mod: PBBFAC,,, | Performed by: FAMILY MEDICINE

## 2022-04-22 PROCEDURE — 1126F AMNT PAIN NOTED NONE PRSNT: CPT | Mod: CPTII,S$GLB,, | Performed by: FAMILY MEDICINE

## 2022-04-22 PROCEDURE — 99214 PR OFFICE/OUTPT VISIT, EST, LEVL IV, 30-39 MIN: ICD-10-PCS | Mod: S$GLB,,, | Performed by: FAMILY MEDICINE

## 2022-04-22 PROCEDURE — 3078F PR MOST RECENT DIASTOLIC BLOOD PRESSURE < 80 MM HG: ICD-10-PCS | Mod: CPTII,S$GLB,, | Performed by: FAMILY MEDICINE

## 2022-04-22 PROCEDURE — 1159F MED LIST DOCD IN RCRD: CPT | Mod: CPTII,S$GLB,, | Performed by: FAMILY MEDICINE

## 2022-04-22 PROCEDURE — 3074F SYST BP LT 130 MM HG: CPT | Mod: CPTII,S$GLB,, | Performed by: FAMILY MEDICINE

## 2022-04-22 PROCEDURE — 3074F PR MOST RECENT SYSTOLIC BLOOD PRESSURE < 130 MM HG: ICD-10-PCS | Mod: CPTII,S$GLB,, | Performed by: FAMILY MEDICINE

## 2022-04-22 PROCEDURE — 1126F PR PAIN SEVERITY QUANTIFIED, NO PAIN PRESENT: ICD-10-PCS | Mod: CPTII,S$GLB,, | Performed by: FAMILY MEDICINE

## 2022-04-22 NOTE — PROGRESS NOTES
"Subjective:       Patient ID: Jaimee Mccarthy is a 71 y.o. female.    Chief Complaint: Follow-up      HPI Comments:       Current Outpatient Medications:     DULoxetine (CYMBALTA) 30 MG capsule, TAKE 1 CAPSULE BY MOUTH DAILY FOR DEPRESSION., Disp: 30 capsule, Rfl: 0    DULoxetine (CYMBALTA) 60 MG capsule, TAKE 1 CAPSULE BY MOUTH DAILY FOR DEPRESSION., Disp: 30 capsule, Rfl: 0       6 month follow-up.  Says she is doing well.  Staying active in the garden, etc. .      COVID vaccine x3.      Due for her final Pap smear this year.  [facility].  I will order her mammogram for August.  I am also going to get another DEXA scan, as she was found have osteopenia in 2019.  Says she has been compliant with calcium and vitamin-D since then.      Never got started on the Pravachol because of concerns about dental work she was doing.  Says the dental work is not completed so she does not want to start this medication.  She has had trouble with other statins.    Still smoking a few cigarettes per day.      Taking Cymbalta 90 mg daily.      Historical been yet: Chemical exposure many years ago.  Had testing done was told she had "decreased lung capacity by 15%".  She does not want to be retested.  I encouraged her to quit smoking altogether, in order to maximize her pulmonary function    Review of Systems   Constitutional: Negative for activity change, appetite change and fever.   HENT: Negative for sore throat.    Respiratory: Negative for cough and shortness of breath.    Cardiovascular: Negative for chest pain.   Gastrointestinal: Negative for abdominal pain, diarrhea and nausea.   Genitourinary: Negative for difficulty urinating.   Musculoskeletal: Negative for arthralgias and myalgias.   Neurological: Negative for dizziness and headaches.       Objective:      Vitals:    04/22/22 0802   BP: 120/70   Pulse: 89   Resp: 16   Temp: 96.9 °F (36.1 °C)   TempSrc: Temporal   SpO2: 96%   Weight: 61.8 kg (136 lb " "3.9 oz)   Height: 5' 4" (1.626 m)   PainSc: 0-No pain     Physical Exam  Vitals and nursing note reviewed.   Constitutional:       General: She is not in acute distress.     Appearance: She is well-developed. She is not diaphoretic.   HENT:      Head: Normocephalic.   Neck:      Thyroid: No thyromegaly.   Cardiovascular:      Rate and Rhythm: Normal rate and regular rhythm.      Heart sounds: Normal heart sounds. No murmur heard.  Pulmonary:      Effort: Pulmonary effort is normal.      Breath sounds: Normal breath sounds. No wheezing or rales.   Abdominal:      General: There is no distension.      Palpations: Abdomen is soft.   Musculoskeletal:      Cervical back: Neck supple.   Lymphadenopathy:      Cervical: No cervical adenopathy.   Skin:     General: Skin is warm and dry.   Neurological:      Mental Status: She is alert and oriented to person, place, and time.   Psychiatric:         Mood and Affect: Mood normal.         Behavior: Behavior normal.         Thought Content: Thought content normal.         Judgment: Judgment normal.         Assessment:       1. Encounter for screening mammogram for malignant neoplasm of breast    2. Recurrent major depressive disorder, in partial remission    3. Prediabetes    4. Mixed hyperlipidemia    5. Statin myopathy    6. History of cervical cancer    7. Osteopenia, unspecified location    8. Encounter for screening mammogram for malignant neoplasm of breast     9. Postmenopausal        Plan:   Encounter for screening mammogram for malignant neoplasm of breast  Comments:  normal diag MG last year  Orders:  -     Mammo Digital Screening Bilat w/ [facility]; Future; Expected date: 04/22/2022    Recurrent major depressive disorder, in partial remission  Comments:  cymbalta    Prediabetes  Comments:  A1C 5.8    Mixed hyperlipidemia  Comments:   will try pravastatin once patient is completed with her dental work    Statin myopathy    History of cervical cancer  Comments:  needs final " pap this year    Osteopenia, unspecified location  Comments:   repeat DEXA scan.  On calcium and vitamin-D since 2019  Orders:  -     DXA Bone Density Spine And Hip; Future; Expected date: 04/22/2022    Encounter for screening mammogram for malignant neoplasm of breast   Comments:   mammogram ordered for August  Orders:  -     Mammo Digital Screening Bilat w/ [facility]; Future; Expected date: 04/22/2022    Postmenopausal  Comments:   DEXA scan ordered  Orders:  -     DXA Bone Density Spine And Hip; Future; Expected date: 04/22/2022

## 2022-05-19 ENCOUNTER — APPOINTMENT (OUTPATIENT)
Dept: RADIOLOGY | Facility: HOSPITAL | Age: 72
End: 2022-05-19
Attending: FAMILY MEDICINE
Payer: MEDICARE

## 2022-05-19 DIAGNOSIS — Z78.0 POSTMENOPAUSAL: ICD-10-CM

## 2022-05-19 DIAGNOSIS — M85.80 OSTEOPENIA, UNSPECIFIED LOCATION: ICD-10-CM

## 2022-05-19 PROCEDURE — 77080 DXA BONE DENSITY AXIAL: CPT | Mod: 26,,, | Performed by: RADIOLOGY

## 2022-05-19 PROCEDURE — 77080 DXA BONE DENSITY AXIAL: CPT | Mod: TC

## 2022-05-19 PROCEDURE — 77080 DEXA BONE DENSITY SPINE HIP: ICD-10-PCS | Mod: 26,,, | Performed by: RADIOLOGY

## 2022-07-05 ENCOUNTER — TELEPHONE (OUTPATIENT)
Dept: FAMILY MEDICINE | Facility: CLINIC | Age: 72
End: 2022-07-05
Payer: MEDICARE

## 2022-07-05 NOTE — TELEPHONE ENCOUNTER
Spoke with patient letting her know that per the CDC she only has to quarantine for 5 days then she can go back to work wearing a mask for the next 5 days as long as she does not have fever. Patient verbalized understanding and is taking her last Paxlovid today.

## 2022-07-05 NOTE — TELEPHONE ENCOUNTER
----- Message from Teresa Salinas sent at 7/5/2022  8:19 AM CDT -----  Contact: Jaimee  Patient is calling to speak with the nurse regarding previous positive Covid. Patient reports taking the last of the medication for Covid and wants to know what's next. Please give patient a call back at..[phone].

## 2022-09-06 ENCOUNTER — HOSPITAL ENCOUNTER (OUTPATIENT)
Dept: RADIOLOGY | Facility: HOSPITAL | Age: 72
Discharge: HOME | End: 2022-09-06
Attending: FAMILY MEDICINE
Payer: MEDICARE

## 2022-09-06 DIAGNOSIS — Z12.31 ENCOUNTER FOR SCREENING MAMMOGRAM FOR MALIGNANT NEOPLASM OF BREAST: ICD-10-CM

## 2022-09-06 PROCEDURE — 77063 MAMMO DIGITAL SCREENING BILAT WITH TOMO: ICD-10-PCS | Mod: 26,,, | Performed by: RADIOLOGY

## 2022-09-06 PROCEDURE — 77067 SCR MAMMO BI INCL CAD: CPT | Mod: 26,,, | Performed by: RADIOLOGY

## 2022-09-06 PROCEDURE — 77063 BREAST TOMOSYNTHESIS BI: CPT | Mod: TC

## 2022-09-06 PROCEDURE — 77063 BREAST TOMOSYNTHESIS BI: CPT | Mod: 26,,, | Performed by: RADIOLOGY

## 2022-09-06 PROCEDURE — 77067 MAMMO DIGITAL SCREENING BILAT WITH TOMO: ICD-10-PCS | Mod: 26,,, | Performed by: RADIOLOGY

## 2022-09-09 ENCOUNTER — TELEPHONE (OUTPATIENT)
Dept: PSYCHIATRY | Facility: CLINIC | Age: 72
End: 2022-09-09
Payer: MEDICARE

## 2022-09-09 NOTE — TELEPHONE ENCOUNTER
Spoke with pt to reschedule appt--    --- Message from Aniyah Mcarthur sent at 9/1/2022 12:31 PM CDT -----  Regarding: Voicemail  Called to reschedule appointment

## 2022-09-09 NOTE — TELEPHONE ENCOUNTER
return pt call to schedule appt    ----- Message from Aniyah Mcarthur sent at 9/8/2022 12:35 PM CDT -----  Regarding: Voicemail  Called for appointment with Dr. HENAO

## 2022-09-28 ENCOUNTER — TELEPHONE (OUTPATIENT)
Dept: FAMILY MEDICINE | Facility: CLINIC | Age: 72
End: 2022-09-28
Payer: MEDICARE

## 2022-09-28 NOTE — TELEPHONE ENCOUNTER
----- Message from Ynes Valdovinos sent at 9/28/2022 11:08 AM CDT -----  Contact: Jaimee Hermosillo would like a call back at [phone], Regards to a missed a call she got about her results.    Thanks  Td

## 2022-09-28 NOTE — TELEPHONE ENCOUNTER
Returned patient's call, unable to find any record of someone calling her from this office. While attempting to find out where the call came from, patient hung up and would not answer call back.

## 2022-10-17 ENCOUNTER — OFFICE VISIT (OUTPATIENT)
Dept: OPHTHALMOLOGY | Facility: CLINIC | Age: 72
End: 2022-10-17
Payer: MEDICARE

## 2022-10-17 DIAGNOSIS — Z98.890 HISTORY OF REFRACTIVE SURGERY: ICD-10-CM

## 2022-10-17 DIAGNOSIS — H52.03 HYPEROPIA, BILATERAL: ICD-10-CM

## 2022-10-17 DIAGNOSIS — Z46.0 ENCOUNTER FOR FITTING OR ADJUSTMENT OF SPECTACLES OR CONTACT LENSES: ICD-10-CM

## 2022-10-17 DIAGNOSIS — H52.4 BILATERAL PRESBYOPIA: ICD-10-CM

## 2022-10-17 DIAGNOSIS — H25.13 CATARACT, NUCLEAR SCLEROTIC SENILE, BILATERAL: Primary | ICD-10-CM

## 2022-10-17 PROCEDURE — 92014 PR EYE EXAM, EST PATIENT,COMPREHESV: ICD-10-PCS | Mod: S$GLB,,, | Performed by: OPTOMETRIST

## 2022-10-17 PROCEDURE — 92014 COMPRE OPH EXAM EST PT 1/>: CPT | Mod: S$GLB,,, | Performed by: OPTOMETRIST

## 2022-10-17 PROCEDURE — 99999 PR PBB SHADOW E&M-EST. PATIENT-LVL I: ICD-10-PCS | Mod: PBBFAC,,, | Performed by: OPTOMETRIST

## 2022-10-17 PROCEDURE — 99999 PR PBB SHADOW E&M-EST. PATIENT-LVL I: CPT | Mod: PBBFAC,,, | Performed by: OPTOMETRIST

## 2022-10-17 PROCEDURE — 92015 PR REFRACTION: ICD-10-PCS | Mod: S$GLB,,, | Performed by: OPTOMETRIST

## 2022-10-17 PROCEDURE — 1159F PR MEDICATION LIST DOCUMENTED IN MEDICAL RECORD: ICD-10-PCS | Mod: CPTII,S$GLB,, | Performed by: OPTOMETRIST

## 2022-10-17 PROCEDURE — 1159F MED LIST DOCD IN RCRD: CPT | Mod: CPTII,S$GLB,, | Performed by: OPTOMETRIST

## 2022-10-17 PROCEDURE — 92015 DETERMINE REFRACTIVE STATE: CPT | Mod: S$GLB,,, | Performed by: OPTOMETRIST

## 2022-10-17 RX ORDER — CICLOPIROX 80 MG/ML
SOLUTION TOPICAL
COMMUNITY
Start: 2022-08-25

## 2022-10-17 NOTE — PROGRESS NOTES
HPI    Decrease near and distance visual acuity with glasses  Last eye visit 03/15/2021 TRF.  Update glasses and contact lenses RX.  Last edited by Yajaira Cordova MA on 10/17/2022 10:32 AM.            Assessment /Plan     For exam results, see Encounter Report.    Cataract, nuclear sclerotic senile, bilateral    History of refractive surgery    Encounter for fitting or adjustment of spectacles or contact lenses    Hyperopia, bilateral    Bilateral presbyopia      Passed glare test OU    Fluctuating RK scars OU    Discussed CL charges.  Dispense Final Rx for glasses  No changes CL Rx  RTC 1 year  Discussed above and answered questions.

## 2022-10-23 PROBLEM — R73.03 PREDIABETES: Status: ACTIVE | Noted: 2022-10-23

## 2022-10-23 PROBLEM — T46.6X5A STATIN MYOPATHY: Status: ACTIVE | Noted: 2022-10-23

## 2022-10-23 PROBLEM — E78.2 MIXED HYPERLIPIDEMIA: Status: ACTIVE | Noted: 2022-10-23

## 2022-10-23 PROBLEM — M85.80 OSTEOPENIA: Status: ACTIVE | Noted: 2022-10-23

## 2022-10-23 PROBLEM — G72.0 STATIN MYOPATHY: Status: ACTIVE | Noted: 2022-10-23

## 2022-10-24 ENCOUNTER — OFFICE VISIT (OUTPATIENT)
Dept: FAMILY MEDICINE | Facility: CLINIC | Age: 72
End: 2022-10-24
Payer: MEDICARE

## 2022-10-24 VITALS
BODY MASS INDEX: 23.14 KG/M2 | HEART RATE: 95 BPM | WEIGHT: 135.56 LBS | DIASTOLIC BLOOD PRESSURE: 76 MMHG | OXYGEN SATURATION: 96 % | TEMPERATURE: 98 F | SYSTOLIC BLOOD PRESSURE: 156 MMHG | HEIGHT: 64 IN

## 2022-10-24 DIAGNOSIS — T46.6X5A STATIN MYOPATHY: ICD-10-CM

## 2022-10-24 DIAGNOSIS — R03.0 BLOOD PRESSURE ELEVATED WITHOUT HISTORY OF HTN: ICD-10-CM

## 2022-10-24 DIAGNOSIS — M85.80 OSTEOPENIA, UNSPECIFIED LOCATION: ICD-10-CM

## 2022-10-24 DIAGNOSIS — R26.89 BALANCE PROBLEM: ICD-10-CM

## 2022-10-24 DIAGNOSIS — R73.03 PREDIABETES: Primary | ICD-10-CM

## 2022-10-24 DIAGNOSIS — C50.919 MALIGNANT NEOPLASM OF FEMALE BREAST, UNSPECIFIED ESTROGEN RECEPTOR STATUS, UNSPECIFIED LATERALITY, UNSPECIFIED SITE OF BREAST: ICD-10-CM

## 2022-10-24 DIAGNOSIS — E78.2 MIXED HYPERLIPIDEMIA: ICD-10-CM

## 2022-10-24 DIAGNOSIS — G72.0 STATIN MYOPATHY: ICD-10-CM

## 2022-10-24 DIAGNOSIS — F33.41 RECURRENT MAJOR DEPRESSIVE DISORDER, IN PARTIAL REMISSION: ICD-10-CM

## 2022-10-24 PROCEDURE — 99999 PR PBB SHADOW E&M-EST. PATIENT-LVL IV: ICD-10-PCS | Mod: PBBFAC,,, | Performed by: FAMILY MEDICINE

## 2022-10-24 PROCEDURE — 3078F DIAST BP <80 MM HG: CPT | Mod: CPTII,S$GLB,, | Performed by: FAMILY MEDICINE

## 2022-10-24 PROCEDURE — 3077F SYST BP >= 140 MM HG: CPT | Mod: CPTII,S$GLB,, | Performed by: FAMILY MEDICINE

## 2022-10-24 PROCEDURE — 1159F MED LIST DOCD IN RCRD: CPT | Mod: CPTII,S$GLB,, | Performed by: FAMILY MEDICINE

## 2022-10-24 PROCEDURE — 1126F PR PAIN SEVERITY QUANTIFIED, NO PAIN PRESENT: ICD-10-PCS | Mod: CPTII,S$GLB,, | Performed by: FAMILY MEDICINE

## 2022-10-24 PROCEDURE — 99999 PR PBB SHADOW E&M-EST. PATIENT-LVL IV: CPT | Mod: PBBFAC,,, | Performed by: FAMILY MEDICINE

## 2022-10-24 PROCEDURE — 3078F PR MOST RECENT DIASTOLIC BLOOD PRESSURE < 80 MM HG: ICD-10-PCS | Mod: CPTII,S$GLB,, | Performed by: FAMILY MEDICINE

## 2022-10-24 PROCEDURE — 3288F FALL RISK ASSESSMENT DOCD: CPT | Mod: CPTII,S$GLB,, | Performed by: FAMILY MEDICINE

## 2022-10-24 PROCEDURE — 3288F PR FALLS RISK ASSESSMENT DOCUMENTED: ICD-10-PCS | Mod: CPTII,S$GLB,, | Performed by: FAMILY MEDICINE

## 2022-10-24 PROCEDURE — 1101F PT FALLS ASSESS-DOCD LE1/YR: CPT | Mod: CPTII,S$GLB,, | Performed by: FAMILY MEDICINE

## 2022-10-24 PROCEDURE — 99499 RISK ADDL DX/OHS AUDIT: ICD-10-PCS | Mod: HCNC,S$GLB,, | Performed by: FAMILY MEDICINE

## 2022-10-24 PROCEDURE — 99215 PR OFFICE/OUTPT VISIT, EST, LEVL V, 40-54 MIN: ICD-10-PCS | Mod: S$GLB,,, | Performed by: FAMILY MEDICINE

## 2022-10-24 PROCEDURE — 1126F AMNT PAIN NOTED NONE PRSNT: CPT | Mod: CPTII,S$GLB,, | Performed by: FAMILY MEDICINE

## 2022-10-24 PROCEDURE — 99499 UNLISTED E&M SERVICE: CPT | Mod: HCNC,S$GLB,, | Performed by: FAMILY MEDICINE

## 2022-10-24 PROCEDURE — 1101F PR PT FALLS ASSESS DOC 0-1 FALLS W/OUT INJ PAST YR: ICD-10-PCS | Mod: CPTII,S$GLB,, | Performed by: FAMILY MEDICINE

## 2022-10-24 PROCEDURE — 1159F PR MEDICATION LIST DOCUMENTED IN MEDICAL RECORD: ICD-10-PCS | Mod: CPTII,S$GLB,, | Performed by: FAMILY MEDICINE

## 2022-10-24 PROCEDURE — 3077F PR MOST RECENT SYSTOLIC BLOOD PRESSURE >= 140 MM HG: ICD-10-PCS | Mod: CPTII,S$GLB,, | Performed by: FAMILY MEDICINE

## 2022-10-24 PROCEDURE — 99215 OFFICE O/P EST HI 40 MIN: CPT | Mod: S$GLB,,, | Performed by: FAMILY MEDICINE

## 2022-10-24 NOTE — PROGRESS NOTES
"Subjective:       Patient ID: Jaimee Mccarthy is a 71 y.o. female.    Chief Complaint: Follow-up      HPI Comments:       Current Outpatient Medications:     ciclopirox (PENLAC) 8 % Soln, , Disp: , Rfl:     DULoxetine (CYMBALTA) 30 MG capsule, TAKE 1 CAPSULE BY MOUTH DAILY FOR DEPRESSION., Disp: 30 capsule, Rfl: 2    DULoxetine (CYMBALTA) 60 MG capsule, TAKE 1 CAPSULE BY MOUTH DAILY FOR DEPRESSION., Disp: 30 capsule, Rfl: 2      Six month follow-up.  Complains of feeling off balance at times.  Has been going on for few months.  No falling.  Occurs when she is walking.    Says she had a Cologuard test done by [facility].  Has a result note at home.  Will bring it in.  Our records show she had a fit kit done in 2021 but nothing in 2022 yet.    DEXA scan shows some mild improvement.  I am concerned about the balance problem.  Will get a rheumatology consult to discuss treatment.      Says a blood pressure only goes up when she goes to see doctors.  No history of hypertension or treatment.    Did not take statin that I prescribed last time.  Says it is the same kind but had previously caused "gross motor problems".    Review of Systems   Constitutional:  Negative for activity change, appetite change and fever.   HENT:  Negative for sore throat.    Respiratory:  Negative for cough and shortness of breath.    Cardiovascular:  Negative for chest pain.   Gastrointestinal:  Negative for abdominal pain, diarrhea and nausea.   Genitourinary:  Negative for difficulty urinating.   Musculoskeletal:  Positive for gait problem. Negative for arthralgias and myalgias.   Neurological:  Negative for dizziness and headaches.     Objective:      Vitals:    10/24/22 0829   BP: (!) 166/76   Pulse: 95   Temp: 97.8 °F (36.6 °C)   SpO2: 96%   Weight: 61.5 kg (135 lb 9.3 oz)   Height: 5' 4" (1.626 m)   PainSc: 0-No pain     Physical Exam  Vitals and nursing note reviewed.   Constitutional:       General: She is not in acute distress.     " Appearance: She is well-developed. She is not diaphoretic.   HENT:      Head: Normocephalic.   Neck:      Thyroid: No thyromegaly.   Cardiovascular:      Rate and Rhythm: Normal rate and regular rhythm.      Heart sounds: Normal heart sounds. No murmur heard.  Pulmonary:      Effort: Pulmonary effort is normal.      Breath sounds: Normal breath sounds. No wheezing or rales.   Abdominal:      General: There is no distension.      Palpations: Abdomen is soft.   Musculoskeletal:      Cervical back: Neck supple.   Lymphadenopathy:      Cervical: No cervical adenopathy.   Skin:     General: Skin is warm and dry.   Neurological:      Mental Status: She is alert and oriented to person, place, and time.      Cranial Nerves: Cranial nerves 2-12 are intact.      Motor: Motor function is intact.      Coordination: Romberg sign negative. Coordination abnormal.      Comments: Get up and go test normal.  Wobbles while standing after short walk   Psychiatric:         Mood and Affect: Mood normal.         Behavior: Behavior normal.         Thought Content: Thought content normal.         Judgment: Judgment normal.       Assessment:       1. Prediabetes    2. Malignant neoplasm of female breast, unspecified estrogen receptor status, unspecified laterality, unspecified site of breast    3. Recurrent major depressive disorder, in partial remission    4. Mixed hyperlipidemia    5. Osteopenia, unspecified location    6. Statin myopathy    7. Balance problem    8. Blood pressure elevated without history of HTN          Plan:   Prediabetes  Comments:  A1c today  Orders:  -     CBC Auto Differential; Future; Expected date: 10/24/2022  -     Comprehensive Metabolic Panel; Future; Expected date: 10/24/2022  -     Hemoglobin A1C; Future; Expected date: 10/24/2022    Malignant neoplasm of female breast, unspecified estrogen receptor status, unspecified laterality, unspecified site of breast  Comments:  Mammogram okay    Recurrent major  depressive disorder, in partial remission  Comments:  On Cymbalta.  Followed by Psychiatry    Mixed hyperlipidemia  Comments:  Cannot tolerate statins.  Lipid profile  Orders:  -     Lipid Panel; Future; Expected date: 10/24/2022    Osteopenia, unspecified location  Comments:  Concerned about balance problem.  Rheumatology consult to discuss treatment  Orders:  -     Ambulatory referral/consult to Rheumatology; Future; Expected date: 10/31/2022    Statin myopathy    Balance problem  Comments:  PT consult  Orders:  -     Ambulatory referral/consult to Physical/Occupational Therapy; Future; Expected date: 10/31/2022    Blood pressure elevated without history of HTN  Comments:  Follow-up 2 months

## 2022-10-27 ENCOUNTER — LAB VISIT (OUTPATIENT)
Dept: LAB | Facility: HOSPITAL | Age: 72
End: 2022-10-27
Attending: FAMILY MEDICINE
Payer: MEDICARE

## 2022-10-27 DIAGNOSIS — R73.03 PREDIABETES: ICD-10-CM

## 2022-10-27 DIAGNOSIS — E78.2 MIXED HYPERLIPIDEMIA: ICD-10-CM

## 2022-10-27 LAB
ALBUMIN SERPL BCP-MCNC: 4.2 G/DL (ref 3.5–5.2)
ALP SERPL-CCNC: 88 U/L (ref 55–135)
ALT SERPL W/O P-5'-P-CCNC: 17 U/L (ref 10–44)
ANION GAP SERPL CALC-SCNC: 8 MMOL/L (ref 8–16)
AST SERPL-CCNC: 20 U/L (ref 10–40)
BASOPHILS # BLD AUTO: 0.08 K/UL (ref 0–0.2)
BASOPHILS NFR BLD: 1 % (ref 0–1.9)
BILIRUB SERPL-MCNC: 1.3 MG/DL (ref 0.1–1)
BUN SERPL-MCNC: 11 MG/DL (ref 8–23)
CALCIUM SERPL-MCNC: 9.8 MG/DL (ref 8.7–10.5)
CHLORIDE SERPL-SCNC: 107 MMOL/L (ref 95–110)
CHOLEST SERPL-MCNC: 240 MG/DL (ref 120–199)
CHOLEST/HDLC SERPL: 4.8 {RATIO} (ref 2–5)
CO2 SERPL-SCNC: 26 MMOL/L (ref 23–29)
CREAT SERPL-MCNC: 0.8 MG/DL (ref 0.5–1.4)
DIFFERENTIAL METHOD: ABNORMAL
EOSINOPHIL # BLD AUTO: 0.3 K/UL (ref 0–0.5)
EOSINOPHIL NFR BLD: 3.6 % (ref 0–8)
ERYTHROCYTE [DISTWIDTH] IN BLOOD BY AUTOMATED COUNT: 12.4 % (ref 11.5–14.5)
EST. GFR  (NO RACE VARIABLE): >60 ML/MIN/1.73 M^2
ESTIMATED AVG GLUCOSE: 117 MG/DL (ref 68–131)
GLUCOSE SERPL-MCNC: 124 MG/DL (ref 70–110)
HBA1C MFR BLD: 5.7 % (ref 4–5.6)
HCT VFR BLD AUTO: 42.5 % (ref 37–48.5)
HDLC SERPL-MCNC: 50 MG/DL (ref 40–75)
HDLC SERPL: 20.8 % (ref 20–50)
HGB BLD-MCNC: 13.8 G/DL (ref 12–16)
IMM GRANULOCYTES # BLD AUTO: 0.02 K/UL (ref 0–0.04)
IMM GRANULOCYTES NFR BLD AUTO: 0.3 % (ref 0–0.5)
LDLC SERPL CALC-MCNC: 155.6 MG/DL (ref 63–159)
LYMPHOCYTES # BLD AUTO: 2.9 K/UL (ref 1–4.8)
LYMPHOCYTES NFR BLD: 36.3 % (ref 18–48)
MCH RBC QN AUTO: 31.4 PG (ref 27–31)
MCHC RBC AUTO-ENTMCNC: 32.5 G/DL (ref 32–36)
MCV RBC AUTO: 97 FL (ref 82–98)
MONOCYTES # BLD AUTO: 0.7 K/UL (ref 0.3–1)
MONOCYTES NFR BLD: 9 % (ref 4–15)
NEUTROPHILS # BLD AUTO: 3.9 K/UL (ref 1.8–7.7)
NEUTROPHILS NFR BLD: 49.8 % (ref 38–73)
NONHDLC SERPL-MCNC: 190 MG/DL
NRBC BLD-RTO: 0 /100 WBC
PLATELET # BLD AUTO: 296 K/UL (ref 150–450)
PMV BLD AUTO: 10.3 FL (ref 9.2–12.9)
POTASSIUM SERPL-SCNC: 4.2 MMOL/L (ref 3.5–5.1)
PROT SERPL-MCNC: 7.5 G/DL (ref 6–8.4)
RBC # BLD AUTO: 4.39 M/UL (ref 4–5.4)
SODIUM SERPL-SCNC: 141 MMOL/L (ref 136–145)
TRIGL SERPL-MCNC: 172 MG/DL (ref 30–150)
WBC # BLD AUTO: 7.88 K/UL (ref 3.9–12.7)

## 2022-10-27 PROCEDURE — 85025 COMPLETE CBC W/AUTO DIFF WBC: CPT | Performed by: FAMILY MEDICINE

## 2022-10-27 PROCEDURE — 80053 COMPREHEN METABOLIC PANEL: CPT | Performed by: FAMILY MEDICINE

## 2022-10-27 PROCEDURE — 83036 HEMOGLOBIN GLYCOSYLATED A1C: CPT | Performed by: FAMILY MEDICINE

## 2022-10-27 PROCEDURE — 80061 LIPID PANEL: CPT | Performed by: FAMILY MEDICINE

## 2022-10-27 PROCEDURE — 36415 COLL VENOUS BLD VENIPUNCTURE: CPT | Mod: PO | Performed by: FAMILY MEDICINE

## 2022-10-31 ENCOUNTER — OFFICE VISIT (OUTPATIENT)
Dept: PSYCHIATRY | Facility: CLINIC | Age: 72
End: 2022-10-31
Payer: MEDICARE

## 2022-10-31 DIAGNOSIS — Z86.59 HISTORY OF PSYCHOSIS: ICD-10-CM

## 2022-10-31 DIAGNOSIS — G47.00 INSOMNIA, UNSPECIFIED TYPE: ICD-10-CM

## 2022-10-31 DIAGNOSIS — F41.9 ANXIETY: Primary | ICD-10-CM

## 2022-10-31 PROCEDURE — 99214 OFFICE O/P EST MOD 30 MIN: CPT | Mod: S$GLB,,, | Performed by: PSYCHIATRY & NEUROLOGY

## 2022-10-31 PROCEDURE — 3044F PR MOST RECENT HEMOGLOBIN A1C LEVEL <7.0%: ICD-10-PCS | Mod: CPTII,S$GLB,, | Performed by: PSYCHIATRY & NEUROLOGY

## 2022-10-31 PROCEDURE — 99499 RISK ADDL DX/OHS AUDIT: ICD-10-PCS | Mod: S$GLB,,, | Performed by: PSYCHIATRY & NEUROLOGY

## 2022-10-31 PROCEDURE — 3044F HG A1C LEVEL LT 7.0%: CPT | Mod: CPTII,S$GLB,, | Performed by: PSYCHIATRY & NEUROLOGY

## 2022-10-31 PROCEDURE — 99214 PR OFFICE/OUTPT VISIT, EST, LEVL IV, 30-39 MIN: ICD-10-PCS | Mod: S$GLB,,, | Performed by: PSYCHIATRY & NEUROLOGY

## 2022-10-31 PROCEDURE — 99499 UNLISTED E&M SERVICE: CPT | Mod: S$GLB,,, | Performed by: PSYCHIATRY & NEUROLOGY

## 2022-10-31 RX ORDER — TRAZODONE HYDROCHLORIDE 150 MG/1
TABLET ORAL
Qty: 30 TABLET | Refills: 2 | Status: SHIPPED | OUTPATIENT
Start: 2022-10-31 | End: 2023-01-23

## 2022-10-31 RX ORDER — DULOXETIN HYDROCHLORIDE 60 MG/1
CAPSULE, DELAYED RELEASE ORAL
Qty: 30 CAPSULE | Refills: 2 | Status: SHIPPED | OUTPATIENT
Start: 2022-10-31 | End: 2023-01-23

## 2022-10-31 RX ORDER — DULOXETIN HYDROCHLORIDE 30 MG/1
CAPSULE, DELAYED RELEASE ORAL
Qty: 30 CAPSULE | Refills: 2 | Status: SHIPPED | OUTPATIENT
Start: 2022-10-31 | End: 2023-01-23

## 2022-10-31 NOTE — PROGRESS NOTES
"Outpatient Psychiatry Follow-up Visit (MD/NP)    10/31/2022    Jaimee Mccarthy, a 71 y.o. female, presenting for follow-up visit. Met with patient.    Reason for Encounter: follow-up, mood disorder, history of paranoia    Interval Hx: Pt seen and interviewed for follow-up, last seen about 4 months ago. Reports mostly euthymic since last visit. No new health problems. No new medications. Working, doing personal care with 2 patients. Enjoying her work, functioning ok. Health is ok. No new oral medications. Adherent to medication.   No new health problems. To do PT. Some sleep problems. Son coming from out of [facility] for Thanksgiving.     Background: This 68 y/o F presents to [facility] care, previously a pt of Dr. Degroot(sp?) who she last saw about 3-4 months ago, but who she says disclosed protected health information to a family member so she is seeking new psychiatrist. Reports current medication regimen includes Cymbalta 90 mg (60 mg +30 mg) every morning, & doxepin 100 mg daily at bedtime. Says this regimen is working well. On 8.9.17 - had ER visit by [facility] after EMS called. EMS noted paranoia - cited that she'd ripped shingles off roof, told police she thought someone had been in house several days before after calling them due to things moved in her home, heard something on her roof & when she checked found vents weren't sealed, "strange wiring", called police. ED physician exam documented that she didn't seem to be hallucinating, delusional or thought-disordered & she was discharged. No SI/HI. No AVH. PsychHx: as above; "Depression with anxiety" - office-based outpt treatment, inpatient treatment - x2-3 (for changing sleep medication in context of highly distressing insomnia); Series of previous antidepressants - reports having when started Cymbalta 90 mg daily, helped moods far more than prior treatments ("when I started it I thought I was reborn"). Doxepin 100 mg qhs. Partially helpful for Insomnia; Two prior " "psychiatrist, previously Dr. Degroot(sp?) Treating her with same medications. [facility], threatened to turn him in for medicaid fraud. 8.9.17 - ER visit by [facility] after EMS called. EMS noted paranoia - cited that she'd ripped shingles off roof, told police she thought someone had been in house several days before after calling them due to things moved in her home, heard something on her roof & when she checked found vents weren't sealed, "strange wiring", called police. No found to be psychotic on ED assessment. No other PEC's though had visit from a state agency in '16 due to concern by an unknown neighbor for "delusional", "acting out at her apartment" (report didn't describe her specific acting out behavior).  had  come out for a wellness check - "someone in the complex said I wasn't taking care of myself", said my "apartment was a wreck".  found her apartment in order, didn't take her in for care. Denies conflicts with others or reasons someone might make a bad shara report. Has been out of that complex x 5-6 months. One remote episode of passive suicidality in '13 when went in for lumpectomy - ended up with mastectomy, + lymph node dissection then learned pathology showed no cancer. FamHx: sister with serious mental illness, son dyslexia. MedHx: Rib Removed. Breast CA dx for which she had mastectomy - reports pathology later indicated she didn't have CA. Cervical CA (s/p cone) - History of cervical cancer many years ago: Basal and squamous cell CA (nares). Hyperlipidemia. Recurrent urinary tract infections. SocialHx: born in [facility], raised in . 3 sibs growing up; much older (18 and 20 years). lives in subsidized disability housing; sister institutionalized. Lived with patient's parents as an adult. On/off meds. Cycle of hospitalizations. Functioned ok on meds, not off. Doesn't know diagnoses. Normal socially & academically. 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 Moved to [place] to be with son.  x 1 child. Was single mother. Lives in [place].  x 2 (,  36 years). Supportive people "all " - both parents and siblings . Trying to get moved in to house. Trying to volunteer with the food bank. Wants to join [employer]. Social security - recognized mental health disability. Takes medication every day.     Review Of Systems:     GENERAL:  No weight gain or loss  SKIN:  No rashes or lacerations  HEAD:  No headaches  CHEST:  No shortness of breath, hyperventilation or cough  CARDIOVASCULAR:  No tachycardia or chest pain  ABDOMEN:  No nausea, vomiting, pain, constipation or diarrhea  URINARY:  No frequency, dysuria or sexual dysfunction  ENDOCRINE:  No polydipsia, polyuria  MUSCULOSKELETAL:  No pain or stiffness of the joints  NEUROLOGIC:  No weakness, sensory changes, seizures, confusion, memory loss, tremor or other abnormal movements    Current Evaluation:     Nutritional Screening: Considering the patient's height and weight, medications, medical history and preferences, should a referral be made to the dietitian? no    Constitutional  Vitals:  Most recent vital signs, dated less than 90 days prior to this appointment, were not reviewed.    There were no vitals filed for this visit.     General:  unremarkable, age appropriate     Musculoskeletal  Muscle Strength/Tone:  no tremor, no tic   Gait & Station:  non-ataxic     Psychiatric  Appearance: casually dressed & groomed;   Behavior: calm,   Cooperation: cooperative with assessment  Speech: normal rate, volume, tone  Thought Process: linear, goal-directed  Thought Content: No suicidal or homicidal ideation; no delusions  Affect: mildly anxious  Mood: mildly anxious  Perceptions: No auditory or visual hallucinations  Level of Consciousness: alert throughout interview  Insight: fair  Cognition: Oriented to person, place, time, & situation  Memory: no apparent " deficits to general clinical interview; not formally assessed  Attention/Concentration: no apparent deficits to general clinical interview; not formally assessed  Fund of Knowledge: average by vocabulary/education    Laboratory Data  Lab Visit on 10/27/2022   Component Date Value Ref Range Status    WBC 10/27/2022 7.88  3.90 - 12.70 K/uL Final    RBC 10/27/2022 4.39  4.00 - 5.40 M/uL Final    Hemoglobin 10/27/2022 13.8  12.0 - 16.0 g/dL Final    Hematocrit 10/27/2022 42.5  37.0 - 48.5 % Final    MCV 10/27/2022 97  82 - 98 fL Final    MCH 10/27/2022 31.4 (H)  27.0 - 31.0 pg Final    MCHC 10/27/2022 32.5  32.0 - 36.0 g/dL Final    RDW 10/27/2022 12.4  11.5 - 14.5 % Final    Platelets 10/27/2022 296  150 - 450 K/uL Final    MPV 10/27/2022 10.3  9.2 - 12.9 fL Final    Immature Granulocytes 10/27/2022 0.3  0.0 - 0.5 % Final    Gran # (ANC) 10/27/2022 3.9  1.8 - 7.7 K/uL Final    Immature Grans (Abs) 10/27/2022 0.02  0.00 - 0.04 K/uL Final    Lymph # 10/27/2022 2.9  1.0 - 4.8 K/uL Final    Mono # 10/27/2022 0.7  0.3 - 1.0 K/uL Final    Eos # 10/27/2022 0.3  0.0 - 0.5 K/uL Final    Baso # 10/27/2022 0.08  0.00 - 0.20 K/uL Final    nRBC 10/27/2022 0  0 /100 WBC Final    Gran % 10/27/2022 49.8  38.0 - 73.0 % Final    Lymph % 10/27/2022 36.3  18.0 - 48.0 % Final    Mono % 10/27/2022 9.0  4.0 - 15.0 % Final    Eosinophil % 10/27/2022 3.6  0.0 - 8.0 % Final    Basophil % 10/27/2022 1.0  0.0 - 1.9 % Final    Differential Method 10/27/2022 Automated   Final    Sodium 10/27/2022 141  136 - 145 mmol/L Final    Potassium 10/27/2022 4.2  3.5 - 5.1 mmol/L Final    Chloride 10/27/2022 107  95 - 110 mmol/L Final    CO2 10/27/2022 26  23 - 29 mmol/L Final    Glucose 10/27/2022 124 (H)  70 - 110 mg/dL Final    BUN 10/27/2022 11  8 - 23 mg/dL Final    Creatinine 10/27/2022 0.8  0.5 - 1.4 mg/dL Final    Calcium 10/27/2022 9.8  8.7 - 10.5 mg/dL Final    Total Protein 10/27/2022 7.5  6.0 - 8.4 g/dL Final    Albumin 10/27/2022 4.2  3.5 - 5.2  g/dL Final    Total Bilirubin 10/27/2022 1.3 (H)  0.1 - 1.0 mg/dL Final    Alkaline Phosphatase 10/27/2022 88  55 - 135 U/L Final    AST 10/27/2022 20  10 - 40 U/L Final    ALT 10/27/2022 17  10 - 44 U/L Final    Anion Gap 10/27/2022 8  8 - 16 mmol/L Final    eGFR 10/27/2022 >60.0  >60 mL/min/1.73 m^2 Final    Hemoglobin A1C 10/27/2022 5.7 (H)  4.0 - 5.6 % Final    Estimated Avg Glucose 10/27/2022 117  68 - 131 mg/dL Final    Cholesterol 10/27/2022 240 (H)  120 - 199 mg/dL Final    Triglycerides 10/27/2022 172 (H)  30 - 150 mg/dL Final    HDL 10/27/2022 50  40 - 75 mg/dL Final    LDL Cholesterol 10/27/2022 155.6  63.0 - 159.0 mg/dL Final    HDL/Cholesterol Ratio 10/27/2022 20.8  20.0 - 50.0 % Final    Total Cholesterol/HDL Ratio 10/27/2022 4.8  2.0 - 5.0 Final    Non-HDL Cholesterol 10/27/2022 190  mg/dL Final     Medications  Outpatient Encounter Medications as of 10/31/2022   Medication Sig Dispense Refill    ciclopirox (PENLAC) 8 % Soln       DULoxetine (CYMBALTA) 30 MG capsule TAKE 1 CAPSULE BY MOUTH DAILY FOR DEPRESSION. 30 capsule 2    DULoxetine (CYMBALTA) 60 MG capsule TAKE 1 CAPSULE BY MOUTH DAILY FOR DEPRESSION. 30 capsule 2     No facility-administered encounter medications on file as of 10/31/2022.     Assessment - Diagnosis - Goals:     Impression: 70 y/o F with hx of chronic, seeming intermittent paranoia, possibly hallucinations as well. Poor insight into nature of symptoms. Self-identifies her mental health problems as with anxiety, depression for years. Has had a P[facility] for concerns for paranoia and near-PEC for alleged neglect, hospitalization in 2018 for psychosis. Didn't tolerate & self-discontinued 2 antipsychotics, but tolerated olanazpine. Free-floating anxiety unrelated to paranoia intermittently continued to be a problem. Recently off antipsychotic due to side effects thus far without return of psychosis. Diagnosed with idiopathic neuropathy with mild symptoms. Stable with ongoing treatment.      Dx: hx of psychosis. insomnia, anxiety,    Treatment Goals:  Specify outcomes written in observable, behavioral terms:   Monitory for paranoia. Control depression and anxiety symptoms by self-report    Treatment Plan/Recommendations:      Continue duloxetine for pain and mood. She's off antipsychotic. Observe for paranoia, other symptoms.   Discussed risks, benefits, and alternatives to treatment plan documented above with patient. I answered all patient questions related to this plan and patient expressed understanding and agreement.     Return to Clinic: 3 months    VIPIN Chavez MD

## 2022-11-07 ENCOUNTER — CLINICAL SUPPORT (OUTPATIENT)
Dept: REHABILITATION | Facility: HOSPITAL | Age: 72
End: 2022-11-07
Attending: FAMILY MEDICINE
Payer: MEDICARE

## 2022-11-07 ENCOUNTER — TELEPHONE (OUTPATIENT)
Dept: FAMILY MEDICINE | Facility: CLINIC | Age: 72
End: 2022-11-07
Payer: MEDICARE

## 2022-11-07 DIAGNOSIS — R26.89 BALANCE PROBLEM: ICD-10-CM

## 2022-11-07 DIAGNOSIS — I89.0 LYMPHEDEMA DUE TO MALIGNANT NEOPLASM: Primary | ICD-10-CM

## 2022-11-07 DIAGNOSIS — R29.898 LEG WEAKNESS, BILATERAL: ICD-10-CM

## 2022-11-07 DIAGNOSIS — C80.1 LYMPHEDEMA DUE TO MALIGNANT NEOPLASM: Primary | ICD-10-CM

## 2022-11-07 PROCEDURE — 97161 PT EVAL LOW COMPLEX 20 MIN: CPT

## 2022-11-07 PROCEDURE — 97110 THERAPEUTIC EXERCISES: CPT

## 2022-11-07 NOTE — TELEPHONE ENCOUNTER
----- Message from Leatha Ross sent at 11/7/2022 12:52 PM CST -----  Contact: Jaimee Hermosillo is calling to have a referral request faxed over to physical therapy at  [phone] [facility]    Thanks  LJ

## 2022-11-07 NOTE — PLAN OF CARE
[facility]  Physical Therapy Initial Evaluation    Name: Jaimee Mccarthy  Clinic Number: [id number]    Therapy Diagnosis:   Encounter Diagnosis   Name Primary?    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  Pt. Reports that she has not had a day off in a while.  Pt. Reports no LOB or falls.  Pt. Reports not having any dizziness.  Pt. Reports noticing that she leans to one direction and walks sideways.       Pain:  Pt. Reports some aches and pain due to work duties    Prior Therapy: yes, for rotator cuff  Social History:  lives alone  Occupation: Medical Assistant/ Medical Technologist  Prior Level of Function: Independent   Current Level of Function: Independent with mild loss of balance and sense of direction lately    Imaging:  R Hip X-Ray 4/23/21  FINDINGS:  Generalized osteopenia.  No fracture identified.  Femoral heads are seated within the acetabula bilaterally.  Mild bilateral hip joint space narrowing.  No evidence of femoral head avascular necrosis.  Mild bilateral sacroiliac joint degenerative changes.  Mild pubic symphysis degenerative changes.  No suspicious osseous lesion.  Multiple surgical clips within the lower abdomen and pelvis.     Impression:     As above.       Medical History:   Past Medical History:   Diagnosis Date    Abnormal Pap smear of cervix     Cervical cancer 1974    treated with [facility]    Lymphedema of left arm     Malignant neoplasm  of female breast, unspecified estrogen receptor status, unspecified laterality, unspecified site of breast 4/21/2022    Mental disorder     anxiety, depression, and insomia    Skin cancer, basal cell     Squamous cell skin cancer     facial       Surgical History:   Jaimee Mccarthy  has a past surgical history that includes Cervical conization w/ laser; Mastectomy (Left); and Refractive surgery (1980).    Medications:   Jaimee has a current medication list which includes the following prescription(s): ciclopirox, duloxetine, duloxetine, and trazodone.    Allergies:   Review of patient's allergies indicates:   Allergen Reactions    Codeine Swelling     Other reaction(s): Itching, swelling throat,airway closes up-SOB    Sulfa (sulfonamide antibiotics) Anaphylaxis     Other reaction(s): ITCHING, SWELLING THROAT, SOB    Tramadol      Other reaction(s): Nausea/Vomiting    Asa-acetaminophen-caff-buffers     Aspirin      Other reaction(s): erodes lining stomach,bleeding ulcers        Pts goals: to improve balance with standing and walking     Objective       CMS Impairment/Limitation/Restriction for FOTO Balance Survey    Therapist reviewed FOTO scores for Jaimee Mccarthy on 11/7/2022.   FOTO documents entered into [employer] - see Media section.    Limitation Score: 45%         Posture/Structure: forward head posture  Gait: some lateral stepping noted with forward walking,  increased PÉREZ    Neuro:    Short Physical Performance Battery  Feet Together - >60 sec.  Semi Tandem-  B > 60 sec.   Tandem- 18 sec bilat.  5 x Sit to Stand -12.55 seconds  Gait Test-  4.32 seconds  Score- 11    SPPB scores range from zero to 12 possible points. SPPB score of 3-9 points in persons with possible   sarcopenia but no mobility disability indicates frailty; SPPB score of 10 or greater for persons with no sarcopenia and no   mobility disability indicates robustness. Persons with a score of 2 or lower who have sarcopenia, potential cachexia, and    mobility disability are determined to be disabled.      SL Stance R:L 0:5 sec.        L/sp AROM:   % Pain Present (Y/N)   FB Reach to ankles    RSB Reach to prox. Fibula head    LSB Reach to prox. Fibula head    BB WNL    RRot Reach to prox. Gastroc     LRot Reach to prox. Gastroc               R L  Strength:  Hip flexors  5/5 4/5     Adductors  2+/5 5/5     Gluteus Medius 5/5 4/5     Gluteus Jacoby 3+/5 3+/5     Gastroc  20rep 20 reps (SL heel raises)     Plank time  60sec- difficulty maintaining neutral spine                      TREATMENT   Treatment Time In: 10:20  Treatment Time Out: 10:30  Total Treatment time separate from Evaluation: 8 minutes    Jaimee received therapeutic exercises to develop strength for 8 minutes including:   Hand heel rocks  Cat Camel mobility  Full plank  Heel raises            Home Exercises and Patient Education Provided    Education provided:   -Education on condition, HEP, and activity    Written Home Exercises Provided: yes.  Exercises were reviewed and Jaimee was able to demonstrate them prior to the end of the session.  Jaimee demonstrated good  understanding of the education provided.     See EMR under Patient Instructions for exercises provided 11/7/2022.    Assessment   Jaimee is a 72 y.o. female referred to outpatient Physical Therapy with a medical diagnosis of R26.89 (ICD-10-CM) - Balance problem. Pt presents with increased risk for falls with decreased tolerance for SL stance bilat., mild weakness in deep abdominals, mild to moderate weakness in B LE.  Pt. Educated on need for vestibular evaluation with Lynda Goode PT and agreeable.  Pt. Reports needing to schedule with Lymphedema Specialist.  Had Lymphedema specialist Becki Kaiser, consult with patient and agreed that referral and management of lymphedema needed.       Pt prognosis is Excellent.   Pt will benefit from skilled outpatient Physical Therapy to address the deficits stated above and in the chart below,  provide pt/family education, and to maximize pt's level of independence.     Plan of care discussed with patient: Yes  Pt's spiritual, cultural and educational needs considered and patient is agreeable to the plan of care and goals as stated below:     Anticipated Barriers for therapy: none    Medical Necessity is demonstrated by the following  History  Co-morbidities and personal factors that may impact the plan of care Co-morbidities:   osteopenia    Personal Factors:   no deficits     low   Examination  Body Structures and Functions, activity limitations and participation restrictions that may impact the plan of care Body Regions:   lower extremities  trunk    Body Systems:    strength  gross coordinated movement  balance  gait  motor control  motor learning    Participation Restrictions:   Limited     Activity limitations:   Learning and applying knowledge  no deficits    General Tasks and Commands  no deficits    Communication  no deficits    Mobility  no deficits    Self care  no deficits    Domestic Life  no deficits    Interactions/Relationships  no deficits    Life Areas  employment    Community and Social Life  community life  recreation and leisure         low   Clinical Presentation stable and uncomplicated low   Decision Making/ Complexity Score: low     Goals:  Short Term Goals: In 5 weeks   1.I with HEP  2.Pt to be able to reach high and low surfaces without discomfort in lumbar spine.  3. Pt to increase TrA strength to very good to assist with improved balance and finding of COG.    4.Pt to have good dynamic standing balance to achieve low/ no risk for falls.  5.Pt to score less than 20% impaired on the FOTO.    Long Term Goals: In 10 weeks  1.Pt to score less than 10% impaired on the FOTO.  2. Patient to demo increase in LE strength to 5/5 gross MMT to improve tolerance for bending and squatting.  3. 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    Plan   Plan of  care Certification: 11/7/2022 to 1/21/23.    Outpatient Physical Therapy 2 times weekly for 10 weeks to include the following interventions: Gait Training, Manual Therapy, Moist Heat/ Ice, Neuromuscular Re-ed, Patient Education, Self Care, Therapeutic Activities, and Therapeutic Exercise.    Vibha Cordova, PT    Thank you for this referral.    These services are reasonable and necessary for the conditions set forth above while under my care.

## 2022-11-18 ENCOUNTER — CLINICAL SUPPORT (OUTPATIENT)
Dept: REHABILITATION | Facility: HOSPITAL | Age: 72
End: 2022-11-18
Payer: MEDICARE

## 2022-11-18 ENCOUNTER — DOCUMENTATION ONLY (OUTPATIENT)
Dept: REHABILITATION | Facility: HOSPITAL | Age: 72
End: 2022-11-18
Payer: MEDICARE

## 2022-11-18 DIAGNOSIS — R29.898 LEG WEAKNESS, BILATERAL: Primary | ICD-10-CM

## 2022-11-18 PROCEDURE — 97110 THERAPEUTIC EXERCISES: CPT | Mod: PN,CQ

## 2022-11-18 NOTE — PROGRESS NOTES
PT/PTA met face to face to discuss pt's treatment plan and progress towards established goals. Pt will be seen by a physical therapist minimally every 6th visit or every 30 days.    Jeff Stovall PTA

## 2022-11-18 NOTE — PROGRESS NOTES
"[facility]   Physical Therapy Treatment Note     Name: Jaimee Mccarthy  Clinic Number: [id number]    Therapy Diagnosis:   Encounter Diagnosis   Name Primary?    Leg weakness, bilateral Yes     Physician: Mihir Gibbs MD    Visit Date: 11/18/2022    Physician: Mihir Gibbs MD     Physician Orders: PT Eval and Treat   Medical Diagnosis from Referral: R26.89 (ICD-10-CM) - Balance problem  Evaluation Date: 11/7/2022  Authorization Period Expiration: 10/24/23  Plan of Care Expiration: 1/21/23  Visit # / Visits authorized: 1/20    PTA Visit #: 1/5     Time In: 7:36am  Time Out: 8:30am  Total Billable Time: 53 minutes    SUBJECTIVE     Pt reports: continuing to have weakness and balance deficits present by the end of the day. Patient reports usually on her feet all day with minimal rest breaks. Patient reports having pain in both shoulders and in back to top of hips.  She was not compliant with home exercise program.  Response to previous treatment: n/a  Functional change: n/a    Pain: 4/10  Location: all over, shoulders/back/hips      OBJECTIVE     Objective Measures updated at progress report unless specified.     Treatment     Jaimee received the treatments listed below:      therapeutic exercises to develop strength, endurance, posture, and core stabilization for 53 minutes including:    Seated OAL alternating with core stabilization; 2x10  SLR supine; 2x8 both  Clams; 2x10 each  TA isometrics; 10x10"  PPT; 2x10  Tandem Stance in parallel bars; 3x20"  Sit to Stands; 2x8  Step Ups in parallel bars; 2x8 each      manual therapy techniques: Myofacial release and Soft tissue Mobilization were applied to the: low back and hips for 00 minutes, including:  Not performed today        Patient Education and Home Exercises     Home Exercises Provided and Patient Education Provided     Education provided:   - consistency with HEP  - rest breaks throughout the day  - upright posture  - core " stabilization    Written Home Exercises Provided: Patient instructed to cont prior HEP. Exercises were reviewed and Jaimee was able to demonstrate them prior to the end of the session.  Jaimee demonstrated good  understanding of the education provided. See EMR under Patient Instructions for exercises provided during therapy sessions    ASSESSMENT     Today is patient's first treatment visit since Initial Evaluation. Patient was introduced to core, hip, and lower extremity strengthening activities. Patient reports feeling challenge with most prescribed exercises but denied any exacerbations of pain. Discussed with patient about importance of regular consistency with home exercises for proper progression of weakness. Patient reported feeling a good workout post treatment and stated feeling excitement to continue with therapy to get better.     Jaimee Is progressing well towards her goals.   Pt prognosis is Good.     Pt will continue to benefit from skilled outpatient physical therapy to address the deficits listed in the problem list box on initial evaluation, provide pt/family education and to maximize pt's level of independence in the home and community environment.     Pt's spiritual, cultural and educational needs considered and pt agreeable to plan of care and goals.     Anticipated barriers to physical therapy: none    Goals: Short Term Goals: In 5 weeks   1.I with HEP  2.Pt to be able to reach high and low surfaces without discomfort in lumbar spine.  3. Pt to increase TrA strength to very good to assist with improved balance and finding of COG.    4.Pt to have good dynamic standing balance to achieve low/ no risk for falls.  5.Pt to score less than 20% impaired on the FOTO.     Long Term Goals: In 10 weeks  1.Pt to score less than 10% impaired on the FOTO.  2. Patient to demo increase in LE strength to 5/5 gross MMT to improve tolerance for bending and squatting.  3. 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    PLAN     Plan of care Certification: 11/7/2022 to 1/21/23.     Outpatient Physical Therapy 2 times weekly for 10 weeks to include the following interventions: Gait Training, Manual Therapy, Moist Heat/ Ice, Neuromuscular Re-ed, Patient Education, Self Care, Therapeutic Activities, and Therapeutic Exercise.    Jeff Stovall, PTA

## 2022-11-28 ENCOUNTER — CLINICAL SUPPORT (OUTPATIENT)
Dept: REHABILITATION | Facility: HOSPITAL | Age: 72
End: 2022-11-28
Payer: MEDICARE

## 2022-11-28 DIAGNOSIS — R29.898 LEG WEAKNESS, BILATERAL: Primary | ICD-10-CM

## 2022-11-28 PROCEDURE — 97112 NEUROMUSCULAR REEDUCATION: CPT | Mod: PN

## 2022-12-02 ENCOUNTER — CLINICAL SUPPORT (OUTPATIENT)
Dept: REHABILITATION | Facility: HOSPITAL | Age: 72
End: 2022-12-02
Payer: MEDICARE

## 2022-12-02 DIAGNOSIS — R29.898 LEG WEAKNESS, BILATERAL: ICD-10-CM

## 2022-12-02 DIAGNOSIS — I89.0 LYMPHEDEMA OF LEFT ARM: Primary | ICD-10-CM

## 2022-12-02 PROCEDURE — 97535 SELF CARE MNGMENT TRAINING: CPT | Mod: PN

## 2022-12-02 NOTE — PROGRESS NOTES
[facility]   Physical Therapy Treatment Note     Name: Jaimee Mccarthy  Clinic Number: [id number]    Therapy Diagnosis:   Encounter Diagnoses   Name Primary?    Lymphedema of left arm Yes    Leg weakness, bilateral        Physician: Mihir Gibbs MD    Visit Date: 12/2/2022    Physician: Mihir Gibbs MD     Physician Orders: PT Eval and Treat   Medical Diagnosis from Referral: R26.89 (ICD-10-CM) - Balance problem (referred for lymphedema management)  Evaluation Date: 11/7/2022  Authorization Period Expiration: 10/24/23  Plan of Care Expiration: 1/21/23  Visit # / Visits authorized: 3/20    PTA Visit #: --/5     Time In: 12:30 pm  Time Out: 1:25 pm  Total Billable Time: 55 minutes    SUBJECTIVE     Pt reports:  bringing a friend today to learn how to wrap her arm. Wants to reduce the size of her arm with bandages and then be fitted for an arm sleeve  She was not compliant with home exercise program.  Response to previous treatment: n/a  Functional change: n/a    Pain: 3/10  Location: left arm      OBJECTIVE     Objective Measures updated at progress report unless specified.     Treatment     Jaimee received the treatments listed below:      (LYMPHEDEMA MANAGEMENT SESSION TODAY)    -SELF CARE/HOME MANAGEMENT x 55 minutes  MULTILAYERED BANDAGING:  Issued supplies and bandaged left UE with cotton stockinet, Arteflex padding, elastomul roll to fingers and hand, 6cm, 10 cm, short stretch bandages - cotton padding at elbow crease, bandaged from fingers to upper arm,  To leave intact 24-48 hours if tolerated, discontinue bandages with any problems,  Return rolled bandages next session. Confirmed wash and wear schedules.   -provided link for Manual Lymph Drainage of left arm ([email or web address]): will instruct Manual Lymph Drainage at follow up sessions    Jaimee received supervised modalities without adverse effects x - minutes:  SEQUENTIAL COMPRESSION PUMP: - upper extremity  Lympha press with  "full sleeve - with distal pressures at 40mmHg   (Follow up sessions)    DEFERRED:  therapeutic exercises to develop strength, endurance, posture, and core stabilization for -- minutes including:    Seated OAL alternating with core stabilization; 2x10  SLR supine; 2x8 both  Clams; 2x10 each  TA isometrics; 10x10"  PPT; 2x10  Tandem Stance in parallel bars; 3x20"  Sit to Stands; 2x8  Step Ups in parallel bars; 2x8 each      manual therapy techniques: Myofacial release and Soft tissue Mobilization were applied to the: low back and hips for -- minutes, including:  Not performed today    Jaimee participated in neuromuscular re-education activities to improve: Balance, Coordination, Kinesthetic, Sense, Proprioception, and Posture for -- minutes. The following activities were included:  Seated and standing vor 30"h/v 2x ea  Lateral stepping with head turn 1x10 B  Amb with head turn post Vc 2 laps  Vor cancellation 1x10 horiz              Patient Education and Home Exercises     Home Exercises Provided and Patient Education Provided     Education provided:   - consistency with HEP  - rest breaks throughout the day  - upright posture  - core stabilization    Written Home Exercises Provided: Patient instructed to cont prior HEP. Exercises were reviewed and Jaimee was able to demonstrate them prior to the end of the session.  Jaimee demonstrated good  understanding of the education provided. See EMR under Patient Instructions for exercises provided during therapy sessions    ASSESSMENT     Jaimee's arm was wrapped with short stretch bandages today. She had a friend come to the session to watch so he can help her wrap the arm. She was instructed to wear the bandages 24/7 except when bathing.  She was instructed to remove bandages if bandages cause pain or circulation problems. The session was video'd so that she and her friend can refer to it when re-bandaging the arm    Jaimee Is progressing well towards her goals.   Pt prognosis " is Good.     Pt will continue to benefit from skilled outpatient physical therapy to address the deficits listed in the problem list box on initial evaluation, provide pt/family education and to maximize pt's level of independence in the home and community environment.     Pt's spiritual, cultural and educational needs considered and pt agreeable to plan of care and goals.     Anticipated barriers to physical therapy: none    Goals: Short Term Goals: In 5 weeks   1.I with HEP  2.Pt to be able to reach high and low surfaces without discomfort in lumbar spine.  3. Pt to increase TrA strength to very good to assist with improved balance and finding of COG.    4.Pt to have good dynamic standing balance to achieve low/ no risk for falls.  5.Pt to score less than 20% impaired on the FOTO.     Long Term Goals: In 10 weeks  1.Pt to score less than 10% impaired on the FOTO.  2. Patient to demo increase in LE strength to 5/5 gross MMT to improve tolerance for bending and squatting.  3. 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    PLAN     Plan of care Certification: 11/7/2022 to 1/21/2023.     Outpatient Physical Therapy 2 times weekly for 10 weeks to include the following interventions: Gait Training, Manual Therapy, Moist Heat/ Ice, Neuromuscular Re-ed, Patient Education, Self Care, Therapeutic Activities, and Therapeutic Exercise.    Becki Kaiser, PT

## 2022-12-05 ENCOUNTER — OFFICE VISIT (OUTPATIENT)
Dept: RHEUMATOLOGY | Facility: CLINIC | Age: 72
End: 2022-12-05
Payer: MEDICARE

## 2022-12-05 ENCOUNTER — LAB VISIT (OUTPATIENT)
Dept: LAB | Facility: HOSPITAL | Age: 72
End: 2022-12-05
Attending: FAMILY MEDICINE
Payer: MEDICARE

## 2022-12-05 ENCOUNTER — CLINICAL SUPPORT (OUTPATIENT)
Dept: REHABILITATION | Facility: HOSPITAL | Age: 72
End: 2022-12-05
Payer: MEDICARE

## 2022-12-05 VITALS
HEIGHT: 64 IN | HEART RATE: 88 BPM | DIASTOLIC BLOOD PRESSURE: 79 MMHG | WEIGHT: 136.44 LBS | SYSTOLIC BLOOD PRESSURE: 132 MMHG | BODY MASS INDEX: 23.29 KG/M2

## 2022-12-05 DIAGNOSIS — M81.0 AGE-RELATED OSTEOPOROSIS WITHOUT CURRENT PATHOLOGICAL FRACTURE: ICD-10-CM

## 2022-12-05 DIAGNOSIS — M81.0 AGE-RELATED OSTEOPOROSIS WITHOUT CURRENT PATHOLOGICAL FRACTURE: Primary | ICD-10-CM

## 2022-12-05 DIAGNOSIS — I89.0 LYMPHEDEMA OF LEFT ARM: Primary | ICD-10-CM

## 2022-12-05 DIAGNOSIS — M85.80 OSTEOPENIA, UNSPECIFIED LOCATION: ICD-10-CM

## 2022-12-05 PROCEDURE — 3044F HG A1C LEVEL LT 7.0%: CPT | Mod: CPTII,S$GLB,, | Performed by: STUDENT IN AN ORGANIZED HEALTH CARE EDUCATION/TRAINING PROGRAM

## 2022-12-05 PROCEDURE — 99204 OFFICE O/P NEW MOD 45 MIN: CPT | Mod: S$GLB,,, | Performed by: STUDENT IN AN ORGANIZED HEALTH CARE EDUCATION/TRAINING PROGRAM

## 2022-12-05 PROCEDURE — 3075F PR MOST RECENT SYSTOLIC BLOOD PRESS GE 130-139MM HG: ICD-10-PCS | Mod: CPTII,S$GLB,, | Performed by: STUDENT IN AN ORGANIZED HEALTH CARE EDUCATION/TRAINING PROGRAM

## 2022-12-05 PROCEDURE — 82306 VITAMIN D 25 HYDROXY: CPT | Performed by: STUDENT IN AN ORGANIZED HEALTH CARE EDUCATION/TRAINING PROGRAM

## 2022-12-05 PROCEDURE — 1126F AMNT PAIN NOTED NONE PRSNT: CPT | Mod: CPTII,S$GLB,, | Performed by: STUDENT IN AN ORGANIZED HEALTH CARE EDUCATION/TRAINING PROGRAM

## 2022-12-05 PROCEDURE — 1159F MED LIST DOCD IN RCRD: CPT | Mod: CPTII,S$GLB,, | Performed by: STUDENT IN AN ORGANIZED HEALTH CARE EDUCATION/TRAINING PROGRAM

## 2022-12-05 PROCEDURE — 3008F BODY MASS INDEX DOCD: CPT | Mod: CPTII,S$GLB,, | Performed by: STUDENT IN AN ORGANIZED HEALTH CARE EDUCATION/TRAINING PROGRAM

## 2022-12-05 PROCEDURE — 3075F SYST BP GE 130 - 139MM HG: CPT | Mod: CPTII,S$GLB,, | Performed by: STUDENT IN AN ORGANIZED HEALTH CARE EDUCATION/TRAINING PROGRAM

## 2022-12-05 PROCEDURE — 1160F PR REVIEW ALL MEDS BY PRESCRIBER/CLIN PHARMACIST DOCUMENTED: ICD-10-PCS | Mod: CPTII,S$GLB,, | Performed by: STUDENT IN AN ORGANIZED HEALTH CARE EDUCATION/TRAINING PROGRAM

## 2022-12-05 PROCEDURE — 1101F PT FALLS ASSESS-DOCD LE1/YR: CPT | Mod: CPTII,S$GLB,, | Performed by: STUDENT IN AN ORGANIZED HEALTH CARE EDUCATION/TRAINING PROGRAM

## 2022-12-05 PROCEDURE — 36415 COLL VENOUS BLD VENIPUNCTURE: CPT | Performed by: STUDENT IN AN ORGANIZED HEALTH CARE EDUCATION/TRAINING PROGRAM

## 2022-12-05 PROCEDURE — 3288F PR FALLS RISK ASSESSMENT DOCUMENTED: ICD-10-PCS | Mod: CPTII,S$GLB,, | Performed by: STUDENT IN AN ORGANIZED HEALTH CARE EDUCATION/TRAINING PROGRAM

## 2022-12-05 PROCEDURE — 97535 SELF CARE MNGMENT TRAINING: CPT | Mod: PN

## 2022-12-05 PROCEDURE — 3008F PR BODY MASS INDEX (BMI) DOCUMENTED: ICD-10-PCS | Mod: CPTII,S$GLB,, | Performed by: STUDENT IN AN ORGANIZED HEALTH CARE EDUCATION/TRAINING PROGRAM

## 2022-12-05 PROCEDURE — 1159F PR MEDICATION LIST DOCUMENTED IN MEDICAL RECORD: ICD-10-PCS | Mod: CPTII,S$GLB,, | Performed by: STUDENT IN AN ORGANIZED HEALTH CARE EDUCATION/TRAINING PROGRAM

## 2022-12-05 PROCEDURE — 99204 PR OFFICE/OUTPT VISIT, NEW, LEVL IV, 45-59 MIN: ICD-10-PCS | Mod: S$GLB,,, | Performed by: STUDENT IN AN ORGANIZED HEALTH CARE EDUCATION/TRAINING PROGRAM

## 2022-12-05 PROCEDURE — 3078F DIAST BP <80 MM HG: CPT | Mod: CPTII,S$GLB,, | Performed by: STUDENT IN AN ORGANIZED HEALTH CARE EDUCATION/TRAINING PROGRAM

## 2022-12-05 PROCEDURE — 3044F PR MOST RECENT HEMOGLOBIN A1C LEVEL <7.0%: ICD-10-PCS | Mod: CPTII,S$GLB,, | Performed by: STUDENT IN AN ORGANIZED HEALTH CARE EDUCATION/TRAINING PROGRAM

## 2022-12-05 PROCEDURE — 99999 PR PBB SHADOW E&M-EST. PATIENT-LVL IV: CPT | Mod: PBBFAC,,, | Performed by: STUDENT IN AN ORGANIZED HEALTH CARE EDUCATION/TRAINING PROGRAM

## 2022-12-05 PROCEDURE — 80053 COMPREHEN METABOLIC PANEL: CPT | Performed by: STUDENT IN AN ORGANIZED HEALTH CARE EDUCATION/TRAINING PROGRAM

## 2022-12-05 PROCEDURE — 3078F PR MOST RECENT DIASTOLIC BLOOD PRESSURE < 80 MM HG: ICD-10-PCS | Mod: CPTII,S$GLB,, | Performed by: STUDENT IN AN ORGANIZED HEALTH CARE EDUCATION/TRAINING PROGRAM

## 2022-12-05 PROCEDURE — 1101F PR PT FALLS ASSESS DOC 0-1 FALLS W/OUT INJ PAST YR: ICD-10-PCS | Mod: CPTII,S$GLB,, | Performed by: STUDENT IN AN ORGANIZED HEALTH CARE EDUCATION/TRAINING PROGRAM

## 2022-12-05 PROCEDURE — 3288F FALL RISK ASSESSMENT DOCD: CPT | Mod: CPTII,S$GLB,, | Performed by: STUDENT IN AN ORGANIZED HEALTH CARE EDUCATION/TRAINING PROGRAM

## 2022-12-05 PROCEDURE — 1126F PR PAIN SEVERITY QUANTIFIED, NO PAIN PRESENT: ICD-10-PCS | Mod: CPTII,S$GLB,, | Performed by: STUDENT IN AN ORGANIZED HEALTH CARE EDUCATION/TRAINING PROGRAM

## 2022-12-05 PROCEDURE — 1160F RVW MEDS BY RX/DR IN RCRD: CPT | Mod: CPTII,S$GLB,, | Performed by: STUDENT IN AN ORGANIZED HEALTH CARE EDUCATION/TRAINING PROGRAM

## 2022-12-05 PROCEDURE — 99999 PR PBB SHADOW E&M-EST. PATIENT-LVL IV: ICD-10-PCS | Mod: PBBFAC,,, | Performed by: STUDENT IN AN ORGANIZED HEALTH CARE EDUCATION/TRAINING PROGRAM

## 2022-12-05 RX ORDER — ALENDRONATE SODIUM 70 MG/1
70 TABLET ORAL
Qty: 4 TABLET | Refills: 11 | Status: SHIPPED | OUTPATIENT
Start: 2022-12-05 | End: 2023-12-05

## 2022-12-05 NOTE — PROGRESS NOTES
[facility]   Physical Therapy Treatment Note     Name: Jaimee Mccarthy  Clinic Number: [id number]    Therapy Diagnosis:   Encounter Diagnosis   Name Primary?    Lymphedema of left arm Yes         Physician: Mihir Gibbs MD    Visit Date: 12/5/2022    Physician: Mihir Gibbs MD     Physician Orders: PT Eval and Treat   Medical Diagnosis from Referral: R26.89 (ICD-10-CM) - Balance problem (referred for lymphedema management and has a order for treatment)  Evaluation Date: 11/7/2022  Authorization Period Expiration: 10/24/23  Plan of Care Expiration: 1/21/23  Visit # / Visits authorized: 4/20    PTA Visit #: --/5     Time In: 2:00 pm  Time Out: 2:25 pm  Total Billable Time: 25 minutes    SUBJECTIVE     Pt reports:  was accidentally late today  She was not compliant with home exercise program.  Response to previous treatment: n/a  Functional change: n/a    Pain: 3/10  Location: left arm      OBJECTIVE     Objective Measures updated at progress report unless specified.     Treatment     Jaimee received the treatments listed below:      (LYMPHEDEMA MANAGEMENT SESSION TODAY)    -SELF CARE/HOME MANAGEMENT x 25 minutes  MULTILAYERED BANDAGING:  Issued supplies and bandaged left UE with cotton stockinet, Arteflex padding, elastomul roll to fingers and hand, 6cm, 10 cm, short stretch bandages - cotton padding at elbow crease, bandaged from fingers to upper arm,  To leave intact 24-48 hours if tolerated, discontinue bandages with any problems,  Return rolled bandages next session. Confirmed wash and wear schedules.   -initiated review and demonstration of Manual Lymph Drainage with written handouts    Jaimee received supervised modalities without adverse effects x - minutes:  SEQUENTIAL COMPRESSION PUMP: - upper extremity  Lympha press with full sleeve - with distal pressures at 40mmHg   (Follow up sessions)        DEFERRED:  therapeutic exercises to develop strength, endurance, posture, and  "core stabilization for -- minutes including:    Seated OAL alternating with core stabilization; 2x10  SLR supine; 2x8 both  Clams; 2x10 each  TA isometrics; 10x10"  PPT; 2x10  Tandem Stance in parallel bars; 3x20"  Sit to Stands; 2x8  Step Ups in parallel bars; 2x8 each      manual therapy techniques: Myofacial release and Soft tissue Mobilization were applied to the: low back and hips for -- minutes, including:  Not performed today    Jaimee participated in neuromuscular re-education activities to improve: Balance, Coordination, Kinesthetic, Sense, Proprioception, and Posture for -- minutes. The following activities were included:  Seated and standing vor 30"h/v 2x ea  Lateral stepping with head turn 1x10 B  Amb with head turn post Vc 2 laps  Vor cancellation 1x10 horiz        Patient Education and Home Exercises     Home Exercises Provided and Patient Education Provided     Education provided:   - consistency with HEP  - rest breaks throughout the day  - upright posture  - core stabilization    Written Home Exercises Provided: Patient instructed to cont prior HEP. Exercises were reviewed and Jaimee was able to demonstrate them prior to the end of the session.  Jaimee demonstrated good  understanding of the education provided. See EMR under Patient Instructions for exercises provided during therapy sessions    ASSESSMENT     Jaimee's arm was re-wrapped with short stretch bandages today. Manual Lymph Drainage was initiated with handouts. She was inadvertently late and there was only 30 minutes of the session to do everything. Manual Lymph Drainage training will continue and compression pump will be used at later sessions. Jaimee is wrapping her own arm. She was re-instructed with hand wrap today.      Jaimee Is progressing well towards her goals.   Pt prognosis is Good.     Pt will continue to benefit from skilled outpatient physical therapy to address the deficits listed in the problem list box on initial evaluation, " provide pt/family education and to maximize pt's level of independence in the home and community environment.     Pt's spiritual, cultural and educational needs considered and pt agreeable to plan of care and goals.     Anticipated barriers to physical therapy: none    Goals: Short Term Goals: In 5 weeks   1.I with HEP  2.Pt to be able to reach high and low surfaces without discomfort in lumbar spine.  3. Pt to increase TrA strength to very good to assist with improved balance and finding of COG.    4.Pt to have good dynamic standing balance to achieve low/ no risk for falls.  5.Pt to score less than 20% impaired on the FOTO.     Long Term Goals: In 10 weeks  1.Pt to score less than 10% impaired on the FOTO.  2. Patient to demo increase in LE strength to 5/5 gross MMT to improve tolerance for bending and squatting.  3. 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  PATIENT will be fitted and receive a left arm compression sleeve and be able to don/doff arm sleeve independently    PLAN     Plan of care Certification: 11/7/2022 to 1/21/2023.     Outpatient Physical Therapy 2 times weekly for 10 weeks to include the following interventions: Gait Training, Manual Therapy, Moist Heat/ Ice, Neuromuscular Re-ed, Patient Education, Self Care, Therapeutic Activities, and Therapeutic Exercise.    Becki Kaiser, PT

## 2022-12-06 LAB
25(OH)D3+25(OH)D2 SERPL-MCNC: 29 NG/ML (ref 30–96)
ALBUMIN SERPL BCP-MCNC: 4 G/DL (ref 3.5–5.2)
ALP SERPL-CCNC: 91 U/L (ref 55–135)
ALT SERPL W/O P-5'-P-CCNC: 11 U/L (ref 10–44)
ANION GAP SERPL CALC-SCNC: 4 MMOL/L (ref 8–16)
AST SERPL-CCNC: 16 U/L (ref 10–40)
BILIRUB SERPL-MCNC: 0.8 MG/DL (ref 0.1–1)
BUN SERPL-MCNC: 10 MG/DL (ref 8–23)
CALCIUM SERPL-MCNC: 10 MG/DL (ref 8.7–10.5)
CHLORIDE SERPL-SCNC: 106 MMOL/L (ref 95–110)
CO2 SERPL-SCNC: 27 MMOL/L (ref 23–29)
CREAT SERPL-MCNC: 0.7 MG/DL (ref 0.5–1.4)
EST. GFR  (NO RACE VARIABLE): >60 ML/MIN/1.73 M^2
GLUCOSE SERPL-MCNC: 97 MG/DL (ref 70–110)
POTASSIUM SERPL-SCNC: 4.2 MMOL/L (ref 3.5–5.1)
PROT SERPL-MCNC: 7.1 G/DL (ref 6–8.4)
SODIUM SERPL-SCNC: 137 MMOL/L (ref 136–145)

## 2022-12-06 NOTE — PROGRESS NOTES
RHEUMATOLOGY CLINIC INITIAL VISIT    Reason for consult:- osteoporosis    Chief complaints, HPI, ROS, EXAM, Assessment & Plans:-    Jaimee Mccarthy is a 72 y.o. pleasant female who presents to be evaluated for osteoporosis.  She had a bone density scan in May that was consistent with osteoporosis via FRAX score.  She has had some falls but no history of fractures.  She states that she will need some dental extractions at some point in the future but she is putting this off for now.  Rheumatologic review of systems is negative.  Physical exam is unremarkable.    Reviewed all available old and outside pertinent medical records.    All lab results personally reviewed and interpreted by me.    1. Age-related osteoporosis without current pathological fracture    2. Osteopenia, unspecified location        Problem List Items Addressed This Visit          Orthopedic    Osteopenia     Other Visit Diagnoses       Age-related osteoporosis without current pathological fracture    -  Primary    Relevant Medications    alendronate (FOSAMAX) 70 MG tablet    Other Relevant Orders    Comprehensive Metabolic Panel    Vitamin D            Patient with osteoporosis on recent DEXA scan in May with FRAX score indicating 3.7% risk of hip fracture in the next 10 years  Will plan to start treatment with alendronate weekly  Counseled on risks of bisphosphonates including ONJ, atypical femur fractures  Encouraged to obtain 1200 mg daily of calcium and 1000 IU of vitamin D3  Counseled on fall prevention  Encouraged to maintain weight-bearing activity, limit alcohol    # Follow up in about 6 months (around 6/5/2023).    Chronic comorbid conditions affecting medical decision making today:    Past Medical History:   Diagnosis Date    Abnormal Pap smear of cervix     Cervical cancer 1974    treated with [facility]    Lymphedema of left arm     Malignant neoplasm of female breast, unspecified estrogen receptor status, unspecified laterality, unspecified site  of breast 4/21/2022    Mental disorder     anxiety, depression, and insomia    Skin cancer, basal cell     Squamous cell skin cancer     facial       Past Surgical History:   Procedure Laterality Date    CERVICAL CONIZATION   W/ LASER      MASTECTOMY Left     final pathology of breast tissue showed no breast cancer    REFRACTIVE SURGERY  1980        Social History     Tobacco Use    Smoking status: Every Day     Types: Cigarettes    Smokeless tobacco: Never   Substance Use Topics    Alcohol use: No    Drug use: No       Family History   Problem Relation Age of Onset    Cancer Father     Cancer Sister     Diabetes Sister     Cancer Brother     Breast cancer Neg Hx     Colon cancer Neg Hx     Ovarian cancer Neg Hx        Review of patient's allergies indicates:   Allergen Reactions    Codeine Swelling     Other reaction(s): Itching, swelling throat,airway closes up-SOB    Sulfa (sulfonamide antibiotics) Anaphylaxis     Other reaction(s): ITCHING, SWELLING THROAT, SOB    Tramadol      Other reaction(s): Nausea/Vomiting    Asa-acetaminophen-caff-buffers     Aspirin      Other reaction(s): erodes lining stomach,bleeding ulcers       Medication List with Changes/Refills   New Medications    ALENDRONATE (FOSAMAX) 70 MG TABLET    Take 1 tablet (70 mg total) by mouth every 7 days.   Current Medications    CICLOPIROX (PENLAC) 8 % SOLN        DULOXETINE (CYMBALTA) 30 MG CAPSULE    TAKE 1 CAPSULE BY MOUTH DAILY FOR DEPRESSION.    DULOXETINE (CYMBALTA) 60 MG CAPSULE    TAKE 1 CAPSULE BY MOUTH DAILY FOR DEPRESSION.    TRAZODONE (DESYREL) 150 MG TABLET    Take 1/2 to 1 tablet at bedtime as needed for sleep.         Disclaimer: This note was prepared using voice recognition system and is likely to have sound alike errors and is not proofread.  Please message me with any questions.    45 minutes of total time spent on the encounter, which includes face to face time and non-face to face time preparing to see the patient (eg, review  of tests), Obtaining and/or reviewing separately obtained history, Documenting clinical information in the electronic or other health record, Independently interpreting results (not separately reported) and communicating results to the patient/family/caregiver, or Care coordination (not separately reported).     Thank you for allowing me to participate in the care of Jaimee Mccarthy.    Louis Zarate MD

## 2022-12-08 NOTE — PROGRESS NOTES
[facility]   Physical Therapy Treatment Note     Name: Jaimee Mccarthy  Clinic Number: [id number]    Therapy Diagnosis:   Encounter Diagnosis   Name Primary?    Leg weakness, bilateral Yes       Physician: Mihir Gibbs MD    Visit Date: 12/9/2022    Physician: Mihir Gibbs MD     Physician Orders: PT Eval and Treat   Medical Diagnosis from Referral: R26.89 (ICD-10-CM) - Balance problem  Evaluation Date: 11/7/2022  Authorization Period Expiration: 10/24/23  Plan of Care Expiration: 1/21/23  Visit # / Visits authorized: 5/20    PTA Visit #: 1/5     Time In: 8:30am  Time Out: 9:30am  Total Billable Time: 44 minutes    SUBJECTIVE     Pt reports: increased dizziness and nausea especially with moving from sitting to lying down.  Pt. Reports unable to afford more than 2 PT visits a week.  Pt. Reports having aches and pains all over and unable to keep up with what is hurting her.  Pt. Reports having an MRI yesterday for ovarian cyst.  Pt. Reports history of cervical cancer.    She was not compliant with home exercise program.  Response to previous treatment: n/a  Functional change: n/a    Pain: 4/10  Location: all over, shoulders/back/hips      OBJECTIVE     Objective Measures updated at progress report unless specified.     Treatment     Jaimee received the treatments listed below:      therapeutic exercises to develop strength, endurance, posture, and core stabilization for 28 minutes including:    Recumbent bike x 7 min. For joint mobility  Forward plank x 35-60sec. holds  B dynamic side planks  Bridges    L Tandem Stance in parallel bars 5- 45 sec. ( B UE in mid grade)  R Tandem Stance in parallel bars ( B UE in mid grade)  Sit to Stands; 2x8- NT  Step Ups in parallel bars ( B UE in mid grade)      Jaimee participated in neuromuscular re-education activities to improve: Balance, Coordination, Kinesthetic, Sense, Proprioception, and Posture for 15 minutes. The following activities were  "included:  Seated and standing vor 30"h/v 2x ea  Lateral stepping with head turn 1x10 B    Amb with head turn post Vc 2 laps  Vor cancellation 1x10 horiz- NT          Patient Education and Home Exercises     Home Exercises Provided and Patient Education Provided     Education provided:   - consistency with HEP  - rest breaks throughout the day  - upright posture  - core stabilization    Written Home Exercises Provided: Patient instructed to cont prior HEP. Exercises were reviewed and Jaimee was able to demonstrate them prior to the end of the session.  Jaimee demonstrated good  understanding of the education provided. See EMR under Patient Instructions for exercises provided during therapy sessions    ASSESSMENT     Consulted with vestibular therapist, and Lynda Goode, PT reports that further vestibular PT needed secondary pt.'s hypofunction of vestibular system.  Will have patient schedule further visits with vestibular therapist.  Cont. Spinal mobility, core strengthening, and hip strengthening.  Cont. Gaze stability exercises provided by vestibular therapist.  Pt. Noted to have increased dizziness with head turns to left.  Pt. Noted to have mild LOB to right during amb with head turns.  Pt. Will cont. Visits with vestibular therapists and lymphedema therapist.  Jaimee Is progressing well towards her goals.   Pt prognosis is Good.     Pt will continue to benefit from skilled outpatient physical therapy to address the deficits listed in the problem list box on initial evaluation, provide pt/family education and to maximize pt's level of independence in the home and community environment.     Pt's spiritual, cultural and educational needs considered and pt agreeable to plan of care and goals.     Anticipated barriers to physical therapy: none    Goals: Short Term Goals: In 5 weeks   1.I with HEP  2.Pt to be able to reach high and low surfaces without discomfort in lumbar spine.  3. Pt to increase TrA strength to very " good to assist with improved balance and finding of COG.    4.Pt to have good dynamic standing balance to achieve low/ no risk for falls.  5.Pt to score less than 20% impaired on the FOTO.     Long Term Goals: In 10 weeks  1.Pt to score less than 10% impaired on the FOTO.  2. Patient to demo increase in LE strength to 5/5 gross MMT to improve tolerance for bending and squatting.  3. 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    PLAN     Plan of care Certification: 11/7/2022 to 1/21/23.     Outpatient Physical Therapy 2 times weekly for 10 weeks to include the following interventions: Gait Training, Manual Therapy, Moist Heat/ Ice, Neuromuscular Re-ed, Patient Education, Self Care, Therapeutic Activities, and Therapeutic Exercise.    Vibha Cordova, PT

## 2022-12-09 ENCOUNTER — CLINICAL SUPPORT (OUTPATIENT)
Dept: REHABILITATION | Facility: HOSPITAL | Age: 72
End: 2022-12-09
Payer: MEDICARE

## 2022-12-09 DIAGNOSIS — R29.898 LEG WEAKNESS, BILATERAL: Primary | ICD-10-CM

## 2022-12-09 PROCEDURE — 97110 THERAPEUTIC EXERCISES: CPT | Mod: PN

## 2022-12-09 PROCEDURE — 97112 NEUROMUSCULAR REEDUCATION: CPT | Mod: PN

## 2022-12-12 ENCOUNTER — CLINICAL SUPPORT (OUTPATIENT)
Dept: REHABILITATION | Facility: HOSPITAL | Age: 72
End: 2022-12-12
Payer: MEDICARE

## 2022-12-12 DIAGNOSIS — I89.0 LYMPHEDEMA OF LEFT ARM: Primary | ICD-10-CM

## 2022-12-12 PROCEDURE — 97535 SELF CARE MNGMENT TRAINING: CPT | Mod: PN

## 2022-12-12 PROCEDURE — 97016 VASOPNEUMATIC DEVICE THERAPY: CPT | Mod: PN

## 2022-12-12 PROCEDURE — 97140 MANUAL THERAPY 1/> REGIONS: CPT | Mod: PN

## 2022-12-12 NOTE — PROGRESS NOTES
[facility]   Physical Therapy Treatment Note     Name: Jaimee Mccarthy  Clinic Number: [id number]    Therapy Diagnosis:   Encounter Diagnosis   Name Primary?    Lymphedema of left arm Yes           Physician: Mihir Gibbs MD    Visit Date: 12/12/2022    Physician: Mihir Gibbs MD     Physician Orders: PT Eval and Treat   Medical Diagnosis from Referral: R26.89 (ICD-10-CM) - Balance problem (referred for lymphedema management and has a order for treatment)  Evaluation Date: 11/7/2022  Authorization Period Expiration: 10/24/23  Plan of Care Expiration: 1/21/23  Visit # / Visits authorized: 6/20    PTA Visit #: --/5     Time In: 0905  Time Out: 1000  Total Billable Time: 40 minutes timed, 20 minutes untimed    SUBJECTIVE     Pt reports:  not wearing the hand wrap because her fingers turn blue and bandage is cutting into her skin  She was not compliant with home exercise program.  Response to previous treatment: improved skin elasticity in left arm  Functional change: n/a    Pain: 3/10  Location: left arm      OBJECTIVE     Objective Measures updated at progress report unless specified.   12/12/2022 - left arm measured    Treatment     Jaimee received the treatments listed below:      (LYMPHEDEMA MANAGEMENT SESSION TODAY)    -SELF CARE/HOME MANAGEMENT x 10 minutes  -Manual therapy x 30 minutes:    MULTILAYERED BANDAGING:  Issued supplies and bandaged left UE with cotton stockinet, Arteflex padding, elastomul roll to fingers and hand, 6cm, 10 cm, short stretch bandages - cotton padding at elbow crease, bandaged from fingers to upper arm,  To leave intact 24-48 hours if tolerated, discontinue bandages with any problems,  Return rolled bandages next session. Confirmed wash and wear schedules.   -bandaged left arm and hand with emphasis on less tension at hand and the use of more cotton and foam to prevent discomfort  -continued Manual Lymph Drainage training with emphasis on spending  "extra time on the lower arm and wrist and lateral chest due to more swelling in those areas  -educated on use of compression pump and how to use one effectively. Recommended pump at home due to swelling in lateral chest wall and wrist/arm that is not responding as effectively with compression therapy.    -Measure left arm: length 47 cm, upper arm 26 cm, elbow 24.5 cm, wrist 16.5 cm, hand circumference 18.5 cm  -faxed information to [employer] for compression sleeve  -sent link for a compression glove on [employer]    Jaimee received supervised modalities without adverse effects x 20 minutes:  SEQUENTIAL COMPRESSION PUMP: - upper extremity  Lympha press with full sleeve - with distal pressures at 40mmHg   -skin elasticity improved after use of the compression      DEFERRED:  therapeutic exercises to develop strength, endurance, posture, and core stabilization for -- minutes including:    Seated OAL alternating with core stabilization; 2x10  SLR supine; 2x8 both  Clams; 2x10 each  TA isometrics; 10x10"  PPT; 2x10  Tandem Stance in parallel bars; 3x20"  Sit to Stands; 2x8  Step Ups in parallel bars; 2x8 each      manual therapy techniques: Myofacial release and Soft tissue Mobilization were applied to the: low back and hips for -- minutes, including:  Not performed today    Jaimee participated in neuromuscular re-education activities to improve: Balance, Coordination, Kinesthetic, Sense, Proprioception, and Posture for -- minutes. The following activities were included:  Seated and standing vor 30"h/v 2x ea  Lateral stepping with head turn 1x10 B  Amb with head turn post Vc 2 laps  Vor cancellation 1x10 horiz        Patient Education and Home Exercises     Home Exercises Provided and Patient Education Provided     Education provided:   - consistency with HEP  - rest breaks throughout the day  - upright posture  - core stabilization    Written Home Exercises Provided: Patient instructed to cont prior HEP. Exercises were " reviewed and Jaimee was able to demonstrate them prior to the end of the session.  Jaimee demonstrated good  understanding of the education provided. See EMR under Patient Instructions for exercises provided during therapy sessions    ASSESSMENT     Jaimee's arm was re-wrapped with short stretch bandages today. Manual Lymph Drainage training was continued with emphasis on technique and sequencing of steps. She presented with swelling in her left hand and fingers due to her not wearing the hand wrap and the bandages pushing extra fluid in her hand. She had more swelling accumulated at the mid lower arm as well. Manual Lymph Drainage was emphasized during the session and she was encouraged to perform the techniques when her bandages are off. Her arm was measured for an arm compression sleeve today. A link for a compression glove was provided to her.     Jaimee Is progressing well towards her goals.   Pt prognosis is Good.     Pt will continue to benefit from skilled outpatient physical therapy to address the deficits listed in the problem list box on initial evaluation, provide pt/family education and to maximize pt's level of independence in the home and community environment.     Pt's spiritual, cultural and educational needs considered and pt agreeable to plan of care and goals.     Anticipated barriers to physical therapy: none    Goals: Short Term Goals: In 5 weeks   1.I with HEP  2.Pt to be able to reach high and low surfaces without discomfort in lumbar spine.  3. Pt to increase TrA strength to very good to assist with improved balance and finding of COG.    4.Pt to have good dynamic standing balance to achieve low/ no risk for falls.  5.Pt to score less than 20% impaired on the FOTO.     Long Term Goals: In 10 weeks  1.Pt to score less than 10% impaired on the FOTO.  2. Patient to demo increase in LE strength to 5/5 gross MMT to improve tolerance for bending and squatting.  3. 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  PATIENT will be fitted and receive a left arm compression sleeve and be able to don/doff arm sleeve independently    PLAN     Plan of care Certification: 11/7/2022 to 1/21/2023.     Outpatient Physical Therapy 2 times weekly for 10 weeks to include the following interventions: Gait Training, Manual Therapy, Moist Heat/ Ice, Neuromuscular Re-ed, Patient Education, Self Care, Therapeutic Activities, and Therapeutic Exercise.    Becki Kaiser, PT

## 2022-12-14 ENCOUNTER — TELEPHONE (OUTPATIENT)
Dept: FAMILY MEDICINE | Facility: CLINIC | Age: 72
End: 2022-12-14
Payer: MEDICARE

## 2022-12-14 NOTE — TELEPHONE ENCOUNTER
Spoke to Ann Marie at [employer] and told her that Rosa Maria was not the prescribing provider that pt sees Dr Gibbs. She said that she was resending the paperwork for his signature.

## 2022-12-14 NOTE — TELEPHONE ENCOUNTER
----- Message from Emelina Mccoy sent at 12/14/2022  4:16 PM CST -----  Contact: Ann Marie/[employer]  Ann Marie is calling in regards to getting an order signed.It's 2 forms and both requires a signature.Please call back at [phone]        Thanks  ERNA

## 2022-12-19 ENCOUNTER — OFFICE VISIT (OUTPATIENT)
Dept: FAMILY MEDICINE | Facility: CLINIC | Age: 72
End: 2022-12-19
Payer: MEDICARE

## 2022-12-19 VITALS
OXYGEN SATURATION: 95 % | HEART RATE: 104 BPM | HEIGHT: 64 IN | WEIGHT: 135.13 LBS | SYSTOLIC BLOOD PRESSURE: 122 MMHG | BODY MASS INDEX: 23.07 KG/M2 | TEMPERATURE: 97 F | DIASTOLIC BLOOD PRESSURE: 72 MMHG

## 2022-12-19 DIAGNOSIS — R03.0 BLOOD PRESSURE ELEVATED WITHOUT HISTORY OF HTN: Primary | ICD-10-CM

## 2022-12-19 DIAGNOSIS — F33.41 RECURRENT MAJOR DEPRESSIVE DISORDER, IN PARTIAL REMISSION: ICD-10-CM

## 2022-12-19 DIAGNOSIS — I89.0 LYMPHEDEMA OF LEFT ARM: ICD-10-CM

## 2022-12-19 DIAGNOSIS — E78.2 MIXED HYPERLIPIDEMIA: ICD-10-CM

## 2022-12-19 DIAGNOSIS — G72.0 STATIN MYOPATHY: ICD-10-CM

## 2022-12-19 DIAGNOSIS — R73.03 PREDIABETES: ICD-10-CM

## 2022-12-19 DIAGNOSIS — M85.80 OSTEOPENIA, UNSPECIFIED LOCATION: ICD-10-CM

## 2022-12-19 DIAGNOSIS — Z12.11 SCREENING FOR COLON CANCER: ICD-10-CM

## 2022-12-19 DIAGNOSIS — T46.6X5A STATIN MYOPATHY: ICD-10-CM

## 2022-12-19 PROCEDURE — 99999 PR PBB SHADOW E&M-EST. PATIENT-LVL III: CPT | Mod: PBBFAC,,, | Performed by: FAMILY MEDICINE

## 2022-12-19 PROCEDURE — 1159F MED LIST DOCD IN RCRD: CPT | Mod: CPTII,S$GLB,, | Performed by: FAMILY MEDICINE

## 2022-12-19 PROCEDURE — 1101F PR PT FALLS ASSESS DOC 0-1 FALLS W/OUT INJ PAST YR: ICD-10-PCS | Mod: CPTII,S$GLB,, | Performed by: FAMILY MEDICINE

## 2022-12-19 PROCEDURE — 3074F SYST BP LT 130 MM HG: CPT | Mod: CPTII,S$GLB,, | Performed by: FAMILY MEDICINE

## 2022-12-19 PROCEDURE — 3288F FALL RISK ASSESSMENT DOCD: CPT | Mod: CPTII,S$GLB,, | Performed by: FAMILY MEDICINE

## 2022-12-19 PROCEDURE — 3078F PR MOST RECENT DIASTOLIC BLOOD PRESSURE < 80 MM HG: ICD-10-PCS | Mod: CPTII,S$GLB,, | Performed by: FAMILY MEDICINE

## 2022-12-19 PROCEDURE — 99214 OFFICE O/P EST MOD 30 MIN: CPT | Mod: S$GLB,,, | Performed by: FAMILY MEDICINE

## 2022-12-19 PROCEDURE — 1126F AMNT PAIN NOTED NONE PRSNT: CPT | Mod: CPTII,S$GLB,, | Performed by: FAMILY MEDICINE

## 2022-12-19 PROCEDURE — 1126F PR PAIN SEVERITY QUANTIFIED, NO PAIN PRESENT: ICD-10-PCS | Mod: CPTII,S$GLB,, | Performed by: FAMILY MEDICINE

## 2022-12-19 PROCEDURE — 3044F HG A1C LEVEL LT 7.0%: CPT | Mod: CPTII,S$GLB,, | Performed by: FAMILY MEDICINE

## 2022-12-19 PROCEDURE — 3044F PR MOST RECENT HEMOGLOBIN A1C LEVEL <7.0%: ICD-10-PCS | Mod: CPTII,S$GLB,, | Performed by: FAMILY MEDICINE

## 2022-12-19 PROCEDURE — 3074F PR MOST RECENT SYSTOLIC BLOOD PRESSURE < 130 MM HG: ICD-10-PCS | Mod: CPTII,S$GLB,, | Performed by: FAMILY MEDICINE

## 2022-12-19 PROCEDURE — 1159F PR MEDICATION LIST DOCUMENTED IN MEDICAL RECORD: ICD-10-PCS | Mod: CPTII,S$GLB,, | Performed by: FAMILY MEDICINE

## 2022-12-19 PROCEDURE — 99999 PR PBB SHADOW E&M-EST. PATIENT-LVL III: ICD-10-PCS | Mod: PBBFAC,,, | Performed by: FAMILY MEDICINE

## 2022-12-19 PROCEDURE — 1101F PT FALLS ASSESS-DOCD LE1/YR: CPT | Mod: CPTII,S$GLB,, | Performed by: FAMILY MEDICINE

## 2022-12-19 PROCEDURE — 3008F PR BODY MASS INDEX (BMI) DOCUMENTED: ICD-10-PCS | Mod: CPTII,S$GLB,, | Performed by: FAMILY MEDICINE

## 2022-12-19 PROCEDURE — 3008F BODY MASS INDEX DOCD: CPT | Mod: CPTII,S$GLB,, | Performed by: FAMILY MEDICINE

## 2022-12-19 PROCEDURE — 3288F PR FALLS RISK ASSESSMENT DOCUMENTED: ICD-10-PCS | Mod: CPTII,S$GLB,, | Performed by: FAMILY MEDICINE

## 2022-12-19 PROCEDURE — 3078F DIAST BP <80 MM HG: CPT | Mod: CPTII,S$GLB,, | Performed by: FAMILY MEDICINE

## 2022-12-19 PROCEDURE — 99214 PR OFFICE/OUTPT VISIT, EST, LEVL IV, 30-39 MIN: ICD-10-PCS | Mod: S$GLB,,, | Performed by: FAMILY MEDICINE

## 2022-12-19 NOTE — PROGRESS NOTES
"Subjective:       Patient ID: Jaimee Mccarthy is a 72 y.o. female.    Chief Complaint: Follow-up      HPI Comments:       Current Outpatient Medications:     alendronate (FOSAMAX) 70 MG tablet, Take 1 tablet (70 mg total) by mouth every 7 days., Disp: 4 tablet, Rfl: 11    ciclopirox (PENLAC) 8 % Soln, , Disp: , Rfl:     DULoxetine (CYMBALTA) 30 MG capsule, TAKE 1 CAPSULE BY MOUTH DAILY FOR DEPRESSION., Disp: 30 capsule, Rfl: 2    DULoxetine (CYMBALTA) 60 MG capsule, TAKE 1 CAPSULE BY MOUTH DAILY FOR DEPRESSION., Disp: 30 capsule, Rfl: 2    traZODone (DESYREL) 150 MG tablet, Take 1/2 to 1 tablet at bedtime as needed for sleep., Disp: 30 tablet, Rfl: 2      Two month follow-up to recheck blood pressure.      Her older sister  last week from complications.  She is now only remaining family member.    In the process of quitting smoking.      Had a fit kit cards done that was negative on .      Has some kind of "mass" near her ovary.  Sees Dr. Ambrose gyn tomorrow to find out more.  Says her CEA was normal.    Wants to wait on starting Fosamax until she gets clarification on the mass.  Also would like to go back on pravastatin.  But wants to take it at night to see if she fares better.  Wants to wait again until after the masses settled.    Wants to point out to me that she did not have breast cancer.  After the mastectomy, she says the tissue was negative for cancer.  She had normal mammogram ordered by me a few months ago.  She is in lymphedema clinic and getting good results from wraps on her left arm.    Review of Systems   Constitutional:  Negative for activity change, appetite change and fever.   HENT:  Negative for sore throat.    Respiratory:  Negative for cough and shortness of breath.    Cardiovascular:  Negative for chest pain and leg swelling.   Gastrointestinal:  Negative for abdominal pain, diarrhea and nausea.   Genitourinary:  Negative for difficulty urinating.   Musculoskeletal:  Negative for " "arthralgias and myalgias.   Neurological:  Negative for dizziness and headaches.     Objective:      Vitals:    12/19/22 0823   BP: 122/72   Pulse: 104   Temp: 97.2 °F (36.2 °C)   SpO2: 95%   Weight: 61.3 kg (135 lb 2.3 oz)   Height: 5' 4" (1.626 m)   PainSc: 0-No pain     Physical Exam  Vitals and nursing note reviewed.   Constitutional:       General: She is not in acute distress.     Appearance: She is well-developed. She is not diaphoretic.   HENT:      Head: Normocephalic.   Neck:      Thyroid: No thyromegaly.   Cardiovascular:      Rate and Rhythm: Normal rate and regular rhythm.      Heart sounds: Normal heart sounds. No murmur heard.  Pulmonary:      Effort: Pulmonary effort is normal.      Breath sounds: Normal breath sounds. No wheezing or rales.   Abdominal:      General: There is no distension.      Palpations: Abdomen is soft.   Musculoskeletal:      Cervical back: Neck supple.      Right lower leg: No edema.      Left lower leg: No edema.   Lymphadenopathy:      Cervical: No cervical adenopathy.   Skin:     General: Skin is warm and dry.   Neurological:      Mental Status: She is alert and oriented to person, place, and time.   Psychiatric:         Mood and Affect: Mood normal.         Behavior: Behavior normal.         Thought Content: Thought content normal.         Judgment: Judgment normal.       Assessment:       1. Blood pressure elevated without history of HTN    2. Lymphedema of left arm    3. Prediabetes    4. Recurrent major depressive disorder, in partial remission    5. Mixed hyperlipidemia    6. Statin myopathy    7. Screening for colon cancer    8. Osteopenia, unspecified location          Plan:   Blood pressure elevated without history of HTN  Comments:  Blood pressure normal today and also during recent MD visit    Lymphedema of left arm  Comments:  Status post mastectomy.  Arm wraps are helping    Prediabetes  Comments:  Last A1c 5.7    Recurrent major depressive disorder, in partial " remission  Comments:  Stable.  Followed by Psychiatry    Mixed hyperlipidemia  Comments:  Will restart Pravachol soon.  Wants to take it in the evening this time    Statin myopathy  Comments:  Wants to try statin again at different time of day    Screening for colon cancer  Comments:  Recent fit kit cards negative.  Will scanned to chart    Osteopenia, unspecified location  Comments:  Prescribed Fosamax.  Waiting to find out about her ovarian mass before she starts

## 2023-02-08 ENCOUNTER — TELEPHONE (OUTPATIENT)
Dept: FAMILY MEDICINE | Facility: CLINIC | Age: 73
End: 2023-02-08
Payer: MEDICARE

## 2023-02-08 DIAGNOSIS — I89.0 LYMPHEDEMA OF LEFT ARM: Primary | ICD-10-CM

## 2023-02-08 NOTE — TELEPHONE ENCOUNTER
----- Message from Katy Domínguez sent at 2/8/2023  8:57 AM CST -----  Contact: Pt  Type:  Patient Returning Call    Who Called:pt   Who Left Message for Patient: nurse   Does the patient know what this is regarding?: a referral for phys therapy   Would the patient rather a call back or a response via [employer]?  phone  Best Call Back Number   Additional Information: pt is having some phone issues so pls if no answer call back

## 2023-02-08 NOTE — TELEPHONE ENCOUNTER
----- Message from La Nena Burch sent at 2/8/2023  8:12 AM CST -----  Contact: Jaimee  Type:  Patient Requesting Referral    Who Called: Jaimee  Does the patient already have the specialty appointment scheduled?: no  If yes, what is the date of that appointment?:  Referral to What Specialty: Physical Therapy  Reason for Referral:Lymphasema  Does the patient want the referral with a specific physician?: no  Is the specialist an [facility] or [facility] Physician?: Richardsjasmyn at fax # [phone]  Patient Requesting a Response?: yes   Would the patient rather a call back or a response via [facility]? call  Best Call Back Number: [phone] (home)    Additional Information:

## 2023-02-08 NOTE — TELEPHONE ENCOUNTER
----- Message from Emelina Mccoy sent at 2/8/2023  8:23 AM CST -----  Contact: Jaimee  .Type:  Patient Returning Call    Who Called:Jaimee  Who Left Message for Patient:nurse  Does the patient know what this is regarding?:yes  Would the patient rather a call back or a response via [facility]? Call back  Best Call Back Number:[phone]  Additional Information: na            Thanks  ERNA

## 2023-02-08 NOTE — TELEPHONE ENCOUNTER
Spoke with pt who states she has Lymphedema and since it is now 2023, she needs a new pump for her arm. Pt states she has to go get fitted for pump. Pt needs referral to [facility] faxed over to [phone]. States it needs to be faxed because the fitting for arm pump is through someone else. Please advise.

## 2023-02-13 ENCOUNTER — TELEPHONE (OUTPATIENT)
Dept: RHEUMATOLOGY | Facility: CLINIC | Age: 73
End: 2023-02-13
Payer: MEDICARE

## 2023-02-13 NOTE — TELEPHONE ENCOUNTER
Called patient regarding appointment on 6.5.23 needing to be rescheduled due to Dr. Zarate being out of clinic that day. Rescheduled appointment to 6.12.23 at 8.30 at [place]. Pt verbalized understanding.

## 2023-03-06 ENCOUNTER — OFFICE VISIT (OUTPATIENT)
Dept: PSYCHIATRY | Facility: CLINIC | Age: 73
End: 2023-03-06
Payer: MEDICARE

## 2023-03-06 VITALS
HEART RATE: 80 BPM | BODY MASS INDEX: 23.08 KG/M2 | DIASTOLIC BLOOD PRESSURE: 97 MMHG | SYSTOLIC BLOOD PRESSURE: 171 MMHG | WEIGHT: 134.5 LBS

## 2023-03-06 DIAGNOSIS — G47.00 INSOMNIA, UNSPECIFIED TYPE: ICD-10-CM

## 2023-03-06 DIAGNOSIS — Z86.59 HISTORY OF PSYCHOSIS: ICD-10-CM

## 2023-03-06 DIAGNOSIS — F41.9 ANXIETY: Primary | ICD-10-CM

## 2023-03-06 PROCEDURE — 3008F PR BODY MASS INDEX (BMI) DOCUMENTED: ICD-10-PCS | Mod: HCNC,CPTII,S$GLB, | Performed by: PSYCHIATRY & NEUROLOGY

## 2023-03-06 PROCEDURE — 99214 OFFICE O/P EST MOD 30 MIN: CPT | Mod: HCNC,S$GLB,, | Performed by: PSYCHIATRY & NEUROLOGY

## 2023-03-06 PROCEDURE — 99499 UNLISTED E&M SERVICE: CPT | Mod: S$GLB,,, | Performed by: PSYCHIATRY & NEUROLOGY

## 2023-03-06 PROCEDURE — 3077F PR MOST RECENT SYSTOLIC BLOOD PRESSURE >= 140 MM HG: ICD-10-PCS | Mod: HCNC,CPTII,S$GLB, | Performed by: PSYCHIATRY & NEUROLOGY

## 2023-03-06 PROCEDURE — 99999 PR PBB SHADOW E&M-EST. PATIENT-LVL II: CPT | Mod: PBBFAC,HCNC,, | Performed by: PSYCHIATRY & NEUROLOGY

## 2023-03-06 PROCEDURE — 99499 RISK ADDL DX/OHS AUDIT: ICD-10-PCS | Mod: S$GLB,,, | Performed by: PSYCHIATRY & NEUROLOGY

## 2023-03-06 PROCEDURE — 3080F DIAST BP >= 90 MM HG: CPT | Mod: HCNC,CPTII,S$GLB, | Performed by: PSYCHIATRY & NEUROLOGY

## 2023-03-06 PROCEDURE — 3008F BODY MASS INDEX DOCD: CPT | Mod: HCNC,CPTII,S$GLB, | Performed by: PSYCHIATRY & NEUROLOGY

## 2023-03-06 PROCEDURE — 3080F PR MOST RECENT DIASTOLIC BLOOD PRESSURE >= 90 MM HG: ICD-10-PCS | Mod: HCNC,CPTII,S$GLB, | Performed by: PSYCHIATRY & NEUROLOGY

## 2023-03-06 PROCEDURE — 99999 PR PBB SHADOW E&M-EST. PATIENT-LVL II: ICD-10-PCS | Mod: PBBFAC,HCNC,, | Performed by: PSYCHIATRY & NEUROLOGY

## 2023-03-06 PROCEDURE — 3077F SYST BP >= 140 MM HG: CPT | Mod: HCNC,CPTII,S$GLB, | Performed by: PSYCHIATRY & NEUROLOGY

## 2023-03-06 PROCEDURE — 99214 PR OFFICE/OUTPT VISIT, EST, LEVL IV, 30-39 MIN: ICD-10-PCS | Mod: HCNC,S$GLB,, | Performed by: PSYCHIATRY & NEUROLOGY

## 2023-03-06 RX ORDER — DULOXETIN HYDROCHLORIDE 60 MG/1
CAPSULE, DELAYED RELEASE ORAL
Qty: 30 CAPSULE | Refills: 3 | Status: SHIPPED | OUTPATIENT
Start: 2023-03-06 | End: 2023-06-30 | Stop reason: SDUPTHER

## 2023-03-06 RX ORDER — DULOXETIN HYDROCHLORIDE 30 MG/1
CAPSULE, DELAYED RELEASE ORAL
Qty: 30 CAPSULE | Refills: 3 | Status: SHIPPED | OUTPATIENT
Start: 2023-03-06 | End: 2023-06-30 | Stop reason: SDUPTHER

## 2023-03-06 RX ORDER — TRAZODONE HYDROCHLORIDE 100 MG/1
TABLET ORAL
Qty: 60 TABLET | Refills: 3 | Status: SHIPPED | OUTPATIENT
Start: 2023-03-06 | End: 2023-06-30 | Stop reason: SDUPTHER

## 2023-03-06 NOTE — PROGRESS NOTES
"Outpatient Psychiatry Follow-up Visit (MD/NP)    3/6/2023    Jaimee Mccarthy, a 72 y.o. female, presenting for follow-up visit. Met with patient.    Reason for Encounter: follow-up, mood disorder, history of paranoia    Interval Hx: Pt seen and interviewed for follow-up, last seen about four months ago. Now post hysterectomy, has made an ok recovery. Grieving death of her sister who  around [place]. Has experienced Weight gain. No new medications. 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 Mostly adherent to medication. Denies side effects.     Background: This 68 y/o F presents to [facility], previously a pt of Dr. Degroot(sp?) who she last saw about 3-4 months ago, but who she says disclosed protected health information to a family member so she is seeking new psychiatrist. Reports current medication regimen includes Cymbalta 90 mg (60 mg +30 mg) every morning, & doxepin 100 mg daily at bedtime. Says this regimen is working well. On 17 - had ER visit by [facility] after EMS called. EMS noted paranoia - cited that she'd ripped shingles off roof, told police she thought someone had been in house several days before after calling them due to things moved in her home, heard something on her roof & when she checked found vents weren't sealed, "strange wiring", called police. ED physician exam documented that she didn't seem to be hallucinating, delusional or thought- disordered & she was discharged. No SI/HI. No AVH. PsychHx: as above; "Depression with anxiety" - office-based outpt treatment, inpatient treatment - x2-3 (for changing sleep medication in context of highly distressing insomnia); Series of previous antidepressants - reports having when started Cymbalta 90 mg daily, helped moods far more than prior treatments ("when I started it I thought I was reborn"). Doxepin 100 mg qhs. Partially helpful for Insomnia; Two prior psychiatrist, previously Dr. Degroot(sp?) Treating her with same medications. " "[facility], threatened to turn him in for medicaid fraud. 8.9.17 - ER visit by [facility] after EMS called. EMS noted paranoia - cited that she'd ripped shingles off roof, told police she thought someone had been in house several days before after calling them due to things moved in her home, heard something on her roof & when she checked found vents weren't sealed, "strange wiring", called police. No found to be psychotic on ED assessment. No other PEC's though had visit from a state agency in '16 due to concern by an unknown neighbor for "delusional", "acting out at her apartment" (report didn't describe her specific acting out behavior).  had  come out for a wellness check - "someone in the complex said I wasn't taking care of myself", said my "apartment was a wreck".  found her apartment in order, didn't take her in for care. Denies conflicts with others or reasons someone might make a bad shara report. Has been out of that complex x 5-6 months. One remote episode of passive suicidality in '13 when went in for lumpectomy - ended up with mastectomy, + lymph node dissection then learned pathology showed no cancer. FamHx: sister with serious mental illness, son dyslexia. MedHx: Rib Removed. Breast CA dx for which she had mastectomy - reports pathology later indicated she didn't have CA. Cervical CA (s/p cone) - History of cervical cancer many years ago: Basal and squamous cell CA (nares). Hyperlipidemia. Recurrent urinary tract infections. SocialHx: born in [facility], raised in . 3 sibs growing up; much older (18 and 20 years). lives in subsidized disability housing; sister institutionalized. Lived with patient's parents as an adult. On/off meds. Cycle of hospitalizations. Functioned ok on meds, not off. Doesn't know diagnoses. Normal socially & academically. 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  x 1 " "child. Was single mother. Lives in [place].  x 2 (,  36 years). Supportive people "all " - both parents and siblings . Trying to get moved in to house. Trying to volunteer with the food bank. Wants to join [employer]. Social security - recognized mental health disability. Takes medication every day.     Review Of Systems:     GENERAL:  No weight gain or loss  SKIN:  No rashes or lacerations  HEAD:  No headaches  CHEST:  No shortness of breath, hyperventilation or cough  CARDIOVASCULAR:  No tachycardia or chest pain  ABDOMEN:  No nausea, vomiting, pain, constipation or diarrhea  URINARY:  No frequency, dysuria or sexual dysfunction  ENDOCRINE:  No polydipsia, polyuria  MUSCULOSKELETAL:  No pain or stiffness of the joints  NEUROLOGIC:  No weakness, sensory changes, seizures, confusion, memory loss, tremor or other abnormal movements    Current Evaluation:     Nutritional Screening: Considering the patient's height and weight, medications, medical history and preferences, should a referral be made to the dietitian? no    Constitutional  Vitals:  Most recent vital signs, dated less than 90 days prior to this appointment, were not reviewed.    Vitals:    23 0808   BP: (!) 171/97   Pulse: 80   Weight: 61 kg (134 lb 7.7 oz)        General:  unremarkable, age appropriate     Musculoskeletal  Muscle Strength/Tone:  no tremor, no tic   Gait & Station:  non-ataxic     Psychiatric  Appearance: casually dressed & groomed;   Behavior: calm,   Cooperation: cooperative with assessment  Speech: normal rate, volume, tone  Thought Process: linear, goal-directed  Thought Content: No suicidal or homicidal ideation; no delusions  Affect: mildly anxious  Mood: mildly anxious  Perceptions: No auditory or visual hallucinations  Level of Consciousness: alert throughout interview  Insight: fair  Cognition: Oriented to person, place, time, & situation  Memory: no apparent deficits to general clinical " interview; not formally assessed  Attention/Concentration: no apparent deficits to general clinical interview; not formally assessed  Fund of Knowledge: average by vocabulary/education    Laboratory Data  No visits with results within 1 Month(s) from this visit.   Latest known visit with results is:   Lab Visit on 12/05/2022   Component Date Value Ref Range Status    Sodium 12/05/2022 137  136 - 145 mmol/L Final    Potassium 12/05/2022 4.2  3.5 - 5.1 mmol/L Final    Chloride 12/05/2022 106  95 - 110 mmol/L Final    CO2 12/05/2022 27  23 - 29 mmol/L Final    Glucose 12/05/2022 97  70 - 110 mg/dL Final    BUN 12/05/2022 10  8 - 23 mg/dL Final    Creatinine 12/05/2022 0.7  0.5 - 1.4 mg/dL Final    Calcium 12/05/2022 10.0  8.7 - 10.5 mg/dL Final    Total Protein 12/05/2022 7.1  6.0 - 8.4 g/dL Final    Albumin 12/05/2022 4.0  3.5 - 5.2 g/dL Final    Total Bilirubin 12/05/2022 0.8  0.1 - 1.0 mg/dL Final    Alkaline Phosphatase 12/05/2022 91  55 - 135 U/L Final    AST 12/05/2022 16  10 - 40 U/L Final    ALT 12/05/2022 11  10 - 44 U/L Final    Anion Gap 12/05/2022 4 (L)  8 - 16 mmol/L Final    eGFR 12/05/2022 >60.0  >60 mL/min/1.73 m^2 Final    Vit D, 25-Hydroxy 12/05/2022 29 (L)  30 - 96 ng/mL Final     Medications  Outpatient Encounter Medications as of 3/6/2023   Medication Sig Dispense Refill    alendronate (FOSAMAX) 70 MG tablet Take 1 tablet (70 mg total) by mouth every 7 days. 4 tablet 11    ciclopirox (PENLAC) 8 % Soln       DULoxetine (CYMBALTA) 30 MG capsule TAKE 1 CAPSULE BY MOUTH DAILY FOR DEPRESSION. 30 capsule 1    DULoxetine (CYMBALTA) 60 MG capsule TAKE 1 CAPSULE BY MOUTH DAILY FOR DEPRESSION. 30 capsule 1    traZODone (DESYREL) 150 MG tablet TAKE 1/2-1 TABLET ONCE DAILY AT BEDTIME AS NEEDED FOR SLEEP 30 tablet 1     No facility-administered encounter medications on file as of 3/6/2023.     Assessment - Diagnosis - Goals:     Impression: 70 y/o F with hx of chronic, seeming intermittent paranoia, possibly  hallucinations as well. Poor insight into nature of symptoms. Self-identifies her mental health problems as with anxiety, depression for years. Has had a PEC for concerns for paranoia and near-PEC for alleged neglect, hospitalization in 2018 for psychosis. Didn't tolerate & self-discontinued 2 antipsychotics, but tolerated olanazpine. Free-floating anxiety unrelated to paranoia intermittently continued to be a problem. Recently off antipsychotic due to side effects thus far without return of psychosis. Diagnosed with idiopathic neuropathy with mild symptoms. Stable with ongoing treatment.     Dx: hx of psychosis. insomnia, anxiety,    Treatment Goals:  Specify outcomes written in observable, behavioral terms:   Monitory for paranoia. Control depression and anxiety symptoms by self-report    Treatment Plan/Recommendations:      Continue duloxetine for pain and mood. Trazodone prn sleep. She's off antipsychotic. Observe for paranoia, other symptoms.   Discussed risks, benefits, and alternatives to treatment plan documented above with patient. I answered all patient questions related to this plan and patient expressed understanding and agreement.     Return to Clinic: 3 months    YAEL. Jose Chavez MD

## 2023-06-28 ENCOUNTER — OFFICE VISIT (OUTPATIENT)
Dept: FAMILY MEDICINE | Facility: CLINIC | Age: 73
End: 2023-06-28
Payer: MEDICARE

## 2023-06-28 ENCOUNTER — TELEPHONE (OUTPATIENT)
Dept: CARDIOLOGY | Facility: HOSPITAL | Age: 73
End: 2023-06-28
Payer: MEDICARE

## 2023-06-28 ENCOUNTER — OFFICE VISIT (OUTPATIENT)
Dept: CARDIOLOGY | Facility: CLINIC | Age: 73
End: 2023-06-28
Payer: MEDICARE

## 2023-06-28 VITALS
DIASTOLIC BLOOD PRESSURE: 7 MMHG | OXYGEN SATURATION: 96 % | HEIGHT: 64 IN | TEMPERATURE: 98 F | BODY MASS INDEX: 22.57 KG/M2 | HEART RATE: 86 BPM | SYSTOLIC BLOOD PRESSURE: 130 MMHG | WEIGHT: 132.19 LBS

## 2023-06-28 VITALS
OXYGEN SATURATION: 96 % | DIASTOLIC BLOOD PRESSURE: 60 MMHG | HEIGHT: 64 IN | SYSTOLIC BLOOD PRESSURE: 128 MMHG | WEIGHT: 132 LBS | BODY MASS INDEX: 22.53 KG/M2 | HEART RATE: 86 BPM

## 2023-06-28 DIAGNOSIS — G72.0 STATIN MYOPATHY: Primary | ICD-10-CM

## 2023-06-28 DIAGNOSIS — R07.89 OTHER CHEST PAIN: Primary | ICD-10-CM

## 2023-06-28 DIAGNOSIS — T46.6X5A STATIN MYOPATHY: Primary | ICD-10-CM

## 2023-06-28 DIAGNOSIS — I89.0 LYMPHEDEMA DUE TO MALIGNANT NEOPLASM: ICD-10-CM

## 2023-06-28 DIAGNOSIS — Z78.9 STATIN INTOLERANCE: ICD-10-CM

## 2023-06-28 DIAGNOSIS — Z76.89 ENCOUNTER TO ESTABLISH CARE: ICD-10-CM

## 2023-06-28 DIAGNOSIS — T46.6X5A STATIN MYOPATHY: ICD-10-CM

## 2023-06-28 DIAGNOSIS — M85.80 OSTEOPENIA, UNSPECIFIED LOCATION: ICD-10-CM

## 2023-06-28 DIAGNOSIS — Z12.11 SCREENING FOR COLON CANCER: ICD-10-CM

## 2023-06-28 DIAGNOSIS — C80.1 LYMPHEDEMA DUE TO MALIGNANT NEOPLASM: ICD-10-CM

## 2023-06-28 DIAGNOSIS — Z00.00 ROUTINE HEALTH MAINTENANCE: Primary | ICD-10-CM

## 2023-06-28 DIAGNOSIS — E78.2 MIXED HYPERLIPIDEMIA: ICD-10-CM

## 2023-06-28 DIAGNOSIS — F33.41 RECURRENT MAJOR DEPRESSIVE DISORDER, IN PARTIAL REMISSION: ICD-10-CM

## 2023-06-28 DIAGNOSIS — I49.3 PVC (PREMATURE VENTRICULAR CONTRACTION): ICD-10-CM

## 2023-06-28 DIAGNOSIS — G72.0 STATIN MYOPATHY: ICD-10-CM

## 2023-06-28 DIAGNOSIS — R06.02 SOB (SHORTNESS OF BREATH): ICD-10-CM

## 2023-06-28 DIAGNOSIS — Z00.00 ROUTINE HEALTH MAINTENANCE: ICD-10-CM

## 2023-06-28 DIAGNOSIS — F17.200 SMOKER: ICD-10-CM

## 2023-06-28 DIAGNOSIS — R73.03 PREDIABETES: ICD-10-CM

## 2023-06-28 PROBLEM — R07.9 CHEST PAIN: Status: ACTIVE | Noted: 2023-06-28

## 2023-06-28 PROCEDURE — 3288F PR FALLS RISK ASSESSMENT DOCUMENTED: ICD-10-PCS | Mod: HCNC,CPTII,S$GLB, | Performed by: INTERNAL MEDICINE

## 2023-06-28 PROCEDURE — 99999 PR PBB SHADOW E&M-EST. PATIENT-LVL III: CPT | Mod: PBBFAC,,, | Performed by: INTERNAL MEDICINE

## 2023-06-28 PROCEDURE — 1126F PR PAIN SEVERITY QUANTIFIED, NO PAIN PRESENT: ICD-10-PCS | Mod: HCNC,CPTII,S$GLB, | Performed by: INTERNAL MEDICINE

## 2023-06-28 PROCEDURE — 99205 OFFICE O/P NEW HI 60 MIN: CPT | Mod: HCNC,S$GLB,, | Performed by: INTERNAL MEDICINE

## 2023-06-28 PROCEDURE — 3074F SYST BP LT 130 MM HG: CPT | Mod: HCNC,CPTII,S$GLB, | Performed by: INTERNAL MEDICINE

## 2023-06-28 PROCEDURE — 1101F PR PT FALLS ASSESS DOC 0-1 FALLS W/OUT INJ PAST YR: ICD-10-PCS | Mod: HCNC,CPTII,S$GLB, | Performed by: FAMILY MEDICINE

## 2023-06-28 PROCEDURE — 3008F BODY MASS INDEX DOCD: CPT | Mod: HCNC,CPTII,S$GLB, | Performed by: FAMILY MEDICINE

## 2023-06-28 PROCEDURE — 3288F PR FALLS RISK ASSESSMENT DOCUMENTED: ICD-10-PCS | Mod: HCNC,CPTII,S$GLB, | Performed by: FAMILY MEDICINE

## 2023-06-28 PROCEDURE — 93000 EKG 12-LEAD: ICD-10-PCS | Mod: HCNC,S$GLB,, | Performed by: INTERNAL MEDICINE

## 2023-06-28 PROCEDURE — 3075F SYST BP GE 130 - 139MM HG: CPT | Mod: HCNC,CPTII,S$GLB, | Performed by: FAMILY MEDICINE

## 2023-06-28 PROCEDURE — 3288F FALL RISK ASSESSMENT DOCD: CPT | Mod: HCNC,CPTII,S$GLB, | Performed by: FAMILY MEDICINE

## 2023-06-28 PROCEDURE — 3078F PR MOST RECENT DIASTOLIC BLOOD PRESSURE < 80 MM HG: ICD-10-PCS | Mod: HCNC,CPTII,S$GLB, | Performed by: FAMILY MEDICINE

## 2023-06-28 PROCEDURE — 1159F PR MEDICATION LIST DOCUMENTED IN MEDICAL RECORD: ICD-10-PCS | Mod: HCNC,CPTII,S$GLB, | Performed by: FAMILY MEDICINE

## 2023-06-28 PROCEDURE — 1126F AMNT PAIN NOTED NONE PRSNT: CPT | Mod: HCNC,CPTII,S$GLB, | Performed by: FAMILY MEDICINE

## 2023-06-28 PROCEDURE — 1101F PT FALLS ASSESS-DOCD LE1/YR: CPT | Mod: HCNC,CPTII,S$GLB, | Performed by: INTERNAL MEDICINE

## 2023-06-28 PROCEDURE — 1160F RVW MEDS BY RX/DR IN RCRD: CPT | Mod: HCNC,CPTII,S$GLB, | Performed by: INTERNAL MEDICINE

## 2023-06-28 PROCEDURE — 1101F PT FALLS ASSESS-DOCD LE1/YR: CPT | Mod: HCNC,CPTII,S$GLB, | Performed by: FAMILY MEDICINE

## 2023-06-28 PROCEDURE — 99999 PR PBB SHADOW E&M-EST. PATIENT-LVL III: ICD-10-PCS | Mod: PBBFAC,,, | Performed by: INTERNAL MEDICINE

## 2023-06-28 PROCEDURE — 99999 PR PBB SHADOW E&M-EST. PATIENT-LVL IV: ICD-10-PCS | Mod: PBBFAC,,, | Performed by: FAMILY MEDICINE

## 2023-06-28 PROCEDURE — 99214 PR OFFICE/OUTPT VISIT, EST, LEVL IV, 30-39 MIN: ICD-10-PCS | Mod: HCNC,S$GLB,, | Performed by: FAMILY MEDICINE

## 2023-06-28 PROCEDURE — 3288F FALL RISK ASSESSMENT DOCD: CPT | Mod: HCNC,CPTII,S$GLB, | Performed by: INTERNAL MEDICINE

## 2023-06-28 PROCEDURE — 3078F DIAST BP <80 MM HG: CPT | Mod: HCNC,CPTII,S$GLB, | Performed by: INTERNAL MEDICINE

## 2023-06-28 PROCEDURE — 3078F PR MOST RECENT DIASTOLIC BLOOD PRESSURE < 80 MM HG: ICD-10-PCS | Mod: HCNC,CPTII,S$GLB, | Performed by: INTERNAL MEDICINE

## 2023-06-28 PROCEDURE — 3008F PR BODY MASS INDEX (BMI) DOCUMENTED: ICD-10-PCS | Mod: HCNC,CPTII,S$GLB, | Performed by: FAMILY MEDICINE

## 2023-06-28 PROCEDURE — 3008F PR BODY MASS INDEX (BMI) DOCUMENTED: ICD-10-PCS | Mod: HCNC,CPTII,S$GLB, | Performed by: INTERNAL MEDICINE

## 2023-06-28 PROCEDURE — 1159F MED LIST DOCD IN RCRD: CPT | Mod: HCNC,CPTII,S$GLB, | Performed by: INTERNAL MEDICINE

## 2023-06-28 PROCEDURE — 3008F BODY MASS INDEX DOCD: CPT | Mod: HCNC,CPTII,S$GLB, | Performed by: INTERNAL MEDICINE

## 2023-06-28 PROCEDURE — 1160F PR REVIEW ALL MEDS BY PRESCRIBER/CLIN PHARMACIST DOCUMENTED: ICD-10-PCS | Mod: HCNC,CPTII,S$GLB, | Performed by: INTERNAL MEDICINE

## 2023-06-28 PROCEDURE — 1126F PR PAIN SEVERITY QUANTIFIED, NO PAIN PRESENT: ICD-10-PCS | Mod: HCNC,CPTII,S$GLB, | Performed by: FAMILY MEDICINE

## 2023-06-28 PROCEDURE — 3074F PR MOST RECENT SYSTOLIC BLOOD PRESSURE < 130 MM HG: ICD-10-PCS | Mod: HCNC,CPTII,S$GLB, | Performed by: INTERNAL MEDICINE

## 2023-06-28 PROCEDURE — 1159F MED LIST DOCD IN RCRD: CPT | Mod: HCNC,CPTII,S$GLB, | Performed by: FAMILY MEDICINE

## 2023-06-28 PROCEDURE — 99999 PR PBB SHADOW E&M-EST. PATIENT-LVL IV: CPT | Mod: PBBFAC,,, | Performed by: FAMILY MEDICINE

## 2023-06-28 PROCEDURE — 99205 PR OFFICE/OUTPT VISIT, NEW, LEVL V, 60-74 MIN: ICD-10-PCS | Mod: HCNC,S$GLB,, | Performed by: INTERNAL MEDICINE

## 2023-06-28 PROCEDURE — 93000 ELECTROCARDIOGRAM COMPLETE: CPT | Mod: HCNC,S$GLB,, | Performed by: INTERNAL MEDICINE

## 2023-06-28 PROCEDURE — 99214 OFFICE O/P EST MOD 30 MIN: CPT | Mod: HCNC,S$GLB,, | Performed by: FAMILY MEDICINE

## 2023-06-28 PROCEDURE — 3078F DIAST BP <80 MM HG: CPT | Mod: HCNC,CPTII,S$GLB, | Performed by: FAMILY MEDICINE

## 2023-06-28 PROCEDURE — 3075F PR MOST RECENT SYSTOLIC BLOOD PRESS GE 130-139MM HG: ICD-10-PCS | Mod: HCNC,CPTII,S$GLB, | Performed by: FAMILY MEDICINE

## 2023-06-28 PROCEDURE — 1159F PR MEDICATION LIST DOCUMENTED IN MEDICAL RECORD: ICD-10-PCS | Mod: HCNC,CPTII,S$GLB, | Performed by: INTERNAL MEDICINE

## 2023-06-28 PROCEDURE — 1126F AMNT PAIN NOTED NONE PRSNT: CPT | Mod: HCNC,CPTII,S$GLB, | Performed by: INTERNAL MEDICINE

## 2023-06-28 PROCEDURE — 1101F PR PT FALLS ASSESS DOC 0-1 FALLS W/OUT INJ PAST YR: ICD-10-PCS | Mod: HCNC,CPTII,S$GLB, | Performed by: INTERNAL MEDICINE

## 2023-06-28 RX ORDER — METOPROLOL SUCCINATE 25 MG/1
12.5 TABLET, EXTENDED RELEASE ORAL DAILY
Qty: 30 TABLET | Refills: 5 | Status: SHIPPED | OUTPATIENT
Start: 2023-06-28

## 2023-06-28 RX ORDER — EZETIMIBE 10 MG/1
10 TABLET ORAL DAILY
Qty: 90 TABLET | Refills: 3 | Status: SHIPPED | OUTPATIENT
Start: 2023-06-28 | End: 2024-06-27

## 2023-06-28 NOTE — PROGRESS NOTES
Subjective:   Patient ID:  Jaimee Mccarthy is a 72 y.o. female who presents for evaluation of Chest Pain      71 yo female referred for frequent PVCs by Dr. Gibbs. Retired.  PMH HLD statin intolerance. Smoker lifelong, cut back now. No drinking. Coffee two cups. No drug  Ekg NSR PVCs LBB pattern.  Some dizziness palpitation. Chest tightness and SOB.  Imbalanced. Lower back pain  No h/o MI DM and CVA        No results found for this or any previous visit.     No results found for this or any previous visit.       Past Medical History:   Diagnosis Date    Abnormal Pap smear of cervix     Cervical cancer 1974    treated with [facility]    Lymphedema of left arm     Malignant neoplasm of female breast, unspecified estrogen receptor status, unspecified laterality, unspecified site of breast 4/21/2022    Mental disorder     anxiety, depression, and insomia    Skin cancer, basal cell     Squamous cell skin cancer     facial       Past Surgical History:   Procedure Laterality Date    CERVICAL CONIZATION   W/ LASER      MASTECTOMY Left     final pathology of breast tissue showed no breast cancer    REFRACTIVE SURGERY  1980       Social History     Tobacco Use    Smoking status: Every Day     Types: Cigarettes    Smokeless tobacco: Never   Substance Use Topics    Alcohol use: No    Drug use: No       Family History   Problem Relation Age of Onset    Cancer Father     Cancer Sister     Diabetes Sister     Cancer Brother     Breast cancer Neg Hx     Colon cancer Neg Hx     Ovarian cancer Neg Hx        Review of Systems   Constitutional: Negative for decreased appetite, diaphoresis, fever, malaise/fatigue and night sweats.   HENT:  Negative for nosebleeds.    Eyes:  Negative for blurred vision and double vision.   Cardiovascular:  Positive for chest pain, dyspnea on exertion and palpitations. Negative for claudication, irregular heartbeat, leg swelling, near-syncope, orthopnea, paroxysmal nocturnal dyspnea and syncope.   Respiratory:   Negative for cough, shortness of breath, sleep disturbances due to breathing, snoring, sputum production and wheezing.    Endocrine: Negative for cold intolerance and polyuria.   Hematologic/Lymphatic: Does not bruise/bleed easily.   Skin:  Negative for rash.   Musculoskeletal:  Positive for back pain. Negative for falls, joint pain, joint swelling and neck pain.   Gastrointestinal:  Negative for abdominal pain, heartburn, nausea and vomiting.   Genitourinary:  Negative for dysuria, frequency and hematuria.   Neurological:  Positive for dizziness, light-headedness and loss of balance. Negative for difficulty with concentration, focal weakness, headaches, numbness, seizures and weakness.   Psychiatric/Behavioral:  Negative for depression, memory loss and substance abuse. The patient does not have insomnia.    Allergic/Immunologic: Negative for HIV exposure and hives.     Objective:   Physical Exam  HENT:      Head: Normocephalic.   Eyes:      Pupils: Pupils are equal, round, and reactive to light.   Neck:      Thyroid: No thyromegaly.      Vascular: Normal carotid pulses. No carotid bruit or JVD.   Cardiovascular:      Rate and Rhythm: Normal rate and regular rhythm. No extrasystoles are present.     Chest Wall: PMI is not displaced.      Pulses: Normal pulses.      Heart sounds: Normal heart sounds. No murmur heard.    No gallop. No S3 sounds.   Pulmonary:      Effort: No respiratory distress.      Breath sounds: Normal breath sounds. No stridor.   Abdominal:      General: Bowel sounds are normal.      Palpations: Abdomen is soft.      Tenderness: There is no abdominal tenderness. There is no rebound.   Musculoskeletal:         General: Normal range of motion.   Skin:     Findings: No rash.   Neurological:      Mental Status: She is alert and oriented to person, place, and time.   Psychiatric:         Behavior: Behavior normal.       Lab Results   Component Value Date    CHOL 240 (H) 10/27/2022    CHOL 233 (H)  11/19/2021    CHOL 215 (H) 11/10/2020     Lab Results   Component Value Date    HDL 50 10/27/2022    HDL 51 11/19/2021    HDL 45 11/10/2020     Lab Results   Component Value Date    LDLCALC 155.6 10/27/2022    LDLCALC 150.0 11/19/2021    LDLCALC 128.4 11/10/2020     Lab Results   Component Value Date    TRIG 172 (H) 10/27/2022    TRIG 160 (H) 11/19/2021    TRIG 208 (H) 11/10/2020     Lab Results   Component Value Date    CHOLHDL 20.8 10/27/2022    CHOLHDL 21.9 11/19/2021    CHOLHDL 20.9 11/10/2020       Chemistry        Component Value Date/Time     12/05/2022 0855    K 4.2 12/05/2022 0855     12/05/2022 0855    CO2 27 12/05/2022 0855    BUN 10 12/05/2022 0855    CREATININE 0.7 12/05/2022 0855    GLU 97 12/05/2022 0855        Component Value Date/Time    CALCIUM 10.0 12/05/2022 0855    ALKPHOS 91 12/05/2022 0855    AST 16 12/05/2022 0855    ALT 11 12/05/2022 0855    BILITOT 0.8 12/05/2022 0855    ESTGFRAFRICA >60.0 04/23/2021 1125    EGFRNONAA >60.0 04/23/2021 1125          Lab Results   Component Value Date    HGBA1C 5.7 (H) 10/27/2022     Lab Results   Component Value Date    TSH 0.996 06/25/2020     No results found for: INR, PROTIME  Lab Results   Component Value Date    WBC 7.88 10/27/2022    HGB 13.8 10/27/2022    HCT 42.5 10/27/2022    MCV 97 10/27/2022     10/27/2022     BNP  @LABRCNTIP(BNP,BNPTRIAGEBLO)@  CrCl cannot be calculated (Patient's most recent lab result is older than the maximum 7 days allowed.).  No results found in the last 24 hours.  No results found in the last 24 hours.  No results found in the last 24 hours.    Assessment:      1. Other chest pain    2. Statin intolerance    3. PVC (premature ventricular contraction)    4. SOB (shortness of breath)    5. Smoker        Plan:     Echo Phar MPI and HOLTER to r/o ischemia and PVCs load  Add toprolXL 12.5 mg daily in PM  Smoking cessation  Fall precaution  Rtc 3m

## 2023-06-28 NOTE — PROGRESS NOTES
"Subjective:       Patient ID: Jaimee Mccarthy is a 72 y.o. female.    Chief Complaint: Follow-up      HPI Comments:       Current Outpatient Medications:     alendronate (FOSAMAX) 70 MG tablet, Take 1 tablet (70 mg total) by mouth every 7 days., Disp: 4 tablet, Rfl: 11    ciclopirox (PENLAC) 8 % Soln, , Disp: , Rfl:     DULoxetine (CYMBALTA) 30 MG capsule, TAKE 1 CAPSULE BY MOUTH DAILY FOR DEPRESSION., Disp: 30 capsule, Rfl: 3    DULoxetine (CYMBALTA) 60 MG capsule, TAKE 1 CAPSULE BY MOUTH DAILY FOR DEPRESSION., Disp: 30 capsule, Rfl: 3    traZODone (DESYREL) 100 MG tablet, Take 1 to 2 tablets at bedtime as needed for sleep., Disp: 60 tablet, Rfl: 3    ezetimibe (ZETIA) 10 mg tablet, Take 1 tablet (10 mg total) by mouth once daily., Disp: 90 tablet, Rfl: 3      Six-month follow-up.  Had surgery for ovarian mass that included hysterectomy and oophorectomy.  Dr. Krause.  Postop complications with findings of irregular heartbeat.  Was told to see a cardiologist "right away".  Asymptomatic.      Tried Pravachol but had similar problems that she would had before:  Dizziness weakness.  Discontinued again.  Willing to try Zetia    Says she is cut back on smoking quite a bit    Was not able to get insurance to cover the lymphedema pump for her arm.  Is using compression in his doing fairly well    Says she gave us very Cologuard result from outside last year.    Review of Systems   Constitutional:  Negative for activity change, appetite change and fever.   HENT:  Negative for sore throat.    Respiratory:  Negative for cough and shortness of breath.    Cardiovascular:  Negative for chest pain.   Gastrointestinal:  Negative for abdominal pain, diarrhea and nausea.   Genitourinary:  Negative for difficulty urinating.   Musculoskeletal:  Negative for arthralgias and myalgias.   Neurological:  Negative for dizziness and headaches.     Objective:      Vitals:    06/28/23 0816   BP: (!) 130/7   Pulse: 86   Temp: 98.1 °F (36.7 °C) " "  TempSrc: Tympanic   SpO2: 96%   Weight: 59.9 kg (132 lb 2.7 oz)   Height: 5' 4" (1.626 m)   PainSc: 0-No pain     Physical Exam  Vitals and nursing note reviewed.   Constitutional:       General: She is not in acute distress.     Appearance: She is well-developed. She is not diaphoretic.   HENT:      Head: Normocephalic.   Neck:      Thyroid: No thyromegaly.   Cardiovascular:      Rate and Rhythm: Normal rate. Rhythm irregular.      Heart sounds: Normal heart sounds. No murmur heard.  Pulmonary:      Effort: Pulmonary effort is normal.      Breath sounds: Normal breath sounds. No wheezing or rales.   Abdominal:      General: There is no distension.      Palpations: Abdomen is soft.   Musculoskeletal:      Cervical back: Neck supple.   Lymphadenopathy:      Cervical: No cervical adenopathy.   Skin:     General: Skin is warm and dry.   Neurological:      Mental Status: She is alert and oriented to person, place, and time.   Psychiatric:         Mood and Affect: Mood normal.         Behavior: Behavior normal.         Thought Content: Thought content normal.         Judgment: Judgment normal.       Assessment:       1. Statin myopathy    2. Recurrent major depressive disorder, in partial remission    3. Prediabetes    4. Mixed hyperlipidemia    5. Screening for colon cancer    6. Osteopenia, unspecified location    7. Lymphedema due to malignant neoplasm        Plan:   Statin myopathy  Comments:  Will try Zetia.  Follow-up 6 months  Orders:  -     ezetimibe (ZETIA) 10 mg tablet; Take 1 tablet (10 mg total) by mouth once daily.  Dispense: 90 tablet; Refill: 3  -     Ambulatory referral/consult to Cardiology; Future; Expected date: 07/05/2023    Recurrent major depressive disorder, in partial remission  Comments:  Followed by Psychiatry    Prediabetes  Comments:  A1c 5.7    Mixed hyperlipidemia  Comments:  Unable to tolerate statins    Screening for colon cancer  Comments:  Recent Cologuard    Osteopenia, unspecified " location  Comments:  On Fosamax    Lymphedema due to malignant neoplasm  Comments:  Controlled with compression

## 2023-06-30 RX ORDER — DULOXETIN HYDROCHLORIDE 30 MG/1
CAPSULE, DELAYED RELEASE ORAL
Qty: 30 CAPSULE | Refills: 3 | Status: SHIPPED | OUTPATIENT
Start: 2023-06-30 | End: 2023-09-26

## 2023-06-30 RX ORDER — TRAZODONE HYDROCHLORIDE 100 MG/1
TABLET ORAL
Qty: 60 TABLET | Refills: 3 | Status: SHIPPED | OUTPATIENT
Start: 2023-06-30 | End: 2023-09-26 | Stop reason: SDUPTHER

## 2023-06-30 RX ORDER — DULOXETIN HYDROCHLORIDE 60 MG/1
CAPSULE, DELAYED RELEASE ORAL
Qty: 30 CAPSULE | Refills: 3 | Status: SHIPPED | OUTPATIENT
Start: 2023-06-30 | End: 2023-09-26 | Stop reason: SDUPTHER

## 2023-07-11 ENCOUNTER — TELEPHONE (OUTPATIENT)
Dept: CARDIOLOGY | Facility: HOSPITAL | Age: 73
End: 2023-07-11
Payer: MEDICARE

## 2023-07-11 NOTE — TELEPHONE ENCOUNTER
----- Message from Marybel Valdovinos sent at 7/10/2023  4:46 PM CDT -----  Contact: courtney Hermosillo  Pt is calling to speak with the nurse regarding canceling upcoming appt. Please give pt a call back at [phone]. Thanks KD

## 2023-07-21 ENCOUNTER — HOSPITAL ENCOUNTER (OUTPATIENT)
Dept: CARDIOLOGY | Facility: HOSPITAL | Age: 73
Discharge: HOME | End: 2023-07-21
Attending: INTERNAL MEDICINE
Payer: MEDICARE

## 2023-07-21 DIAGNOSIS — R06.02 SOB (SHORTNESS OF BREATH): ICD-10-CM

## 2023-07-21 DIAGNOSIS — R07.89 OTHER CHEST PAIN: ICD-10-CM

## 2023-07-21 DIAGNOSIS — I49.3 PVC (PREMATURE VENTRICULAR CONTRACTION): ICD-10-CM

## 2023-07-21 PROCEDURE — 93226 XTRNL ECG REC<48 HR SCAN A/R: CPT

## 2023-07-21 PROCEDURE — 93227 HOLTER MONITOR - 48 HOUR (CUPID ONLY): ICD-10-PCS | Mod: ,,, | Performed by: INTERNAL MEDICINE

## 2023-07-21 PROCEDURE — 93227 XTRNL ECG REC<48 HR R&I: CPT | Mod: ,,, | Performed by: INTERNAL MEDICINE

## 2023-07-24 ENCOUNTER — TELEPHONE (OUTPATIENT)
Dept: FAMILY MEDICINE | Facility: CLINIC | Age: 73
End: 2023-07-24
Payer: MEDICARE

## 2023-07-24 ENCOUNTER — TELEPHONE (OUTPATIENT)
Dept: CARDIOLOGY | Facility: CLINIC | Age: 73
End: 2023-07-24
Payer: MEDICARE

## 2023-07-24 NOTE — TELEPHONE ENCOUNTER
----- Message from Mihir Gibbs MD sent at 7/24/2023 10:04 AM CDT -----  Regarding: FW: Referral  Contact: Jaimee    ----- Message -----  From: Danny Han LPN  Sent: 7/24/2023  10:02 AM CDT  To: Mihir Gibbs MD  Subject: Referral                                         Pt states when she went for her Holter Monitor, she was told she needed a referral. Please Advise.  ----- Message -----  From: Adonis Cordova  Sent: 7/24/2023   9:43 AM CDT  To: Bernie Ash Staff    Patient is calling to speak with the nurse in regards to referral. Reports with Holter Monitor appt patient was told she needs a referral. Please give patient a callback at [phone].

## 2023-07-24 NOTE — TELEPHONE ENCOUNTER
Attempted to contact pt; no answer; LVM with contact number for pt to return call; sent MD a message to see if he needs to send in a referral for her Holter Monitor

## 2023-07-24 NOTE — TELEPHONE ENCOUNTER
----- Message from Adonis Cordova sent at 7/24/2023  9:41 AM CDT -----  Contact: Jaimee  Patient is calling to speak with the nurse in regards to referral. Reports with Holter Monitor appt patient was told she needs a referral. Please give patient a callback at [phone].

## 2023-07-24 NOTE — TELEPHONE ENCOUNTER
Pt came into clinic this morning and dropped [employer] papers off to the  and said she needed a referral and left; pt did not tell Madison at the  what type of referral she needed. I attempted to contact the pt to see what type of referral she needed and to see if I could get her in to see MD if needed but pt did not answer the phone; LVM with contact number for pt to return call

## 2023-07-26 LAB
OHS CV EVENT MONITOR DAY: 0
OHS CV HOLTER LENGTH DECIMAL HOURS: 47.98
OHS CV HOLTER LENGTH HOURS: 47
OHS CV HOLTER LENGTH MINUTES: 59
OHS CV HOLTER SINUS AVERAGE HR: 95
OHS CV HOLTER SINUS MAX HR: 122
OHS CV HOLTER SINUS MIN HR: 72

## 2023-07-27 ENCOUNTER — TELEPHONE (OUTPATIENT)
Dept: CARDIOLOGY | Facility: CLINIC | Age: 73
End: 2023-07-27
Payer: MEDICARE

## 2023-07-27 NOTE — TELEPHONE ENCOUNTER
Spoke with pt in regards test results. Informed pt holter monitor showed frequent arrhythmia. Pt should continue metoprolol. Pt verbalized she understood information.           ----- Message from Norman Kruse MD sent at 7/26/2023  4:41 PM CDT -----  HOLTER showed frequent arrhythmia  Continue Metoprolol    F/U as scheduled

## 2023-08-16 ENCOUNTER — TELEPHONE (OUTPATIENT)
Dept: PSYCHIATRY | Facility: CLINIC | Age: 73
End: 2023-08-16
Payer: MEDICARE

## 2023-08-16 NOTE — TELEPHONE ENCOUNTER
----- Message from Keisha Delaney sent at 8/16/2023 12:26 PM CDT -----  Pt would like to schedule an appt. Call back number is [phone]. Thx. EL

## 2023-09-26 ENCOUNTER — TELEPHONE (OUTPATIENT)
Dept: PSYCHIATRY | Facility: CLINIC | Age: 73
End: 2023-09-26
Payer: MEDICARE

## 2023-09-26 RX ORDER — TRAZODONE HYDROCHLORIDE 100 MG/1
TABLET ORAL
Qty: 60 TABLET | Refills: 2 | Status: SHIPPED | OUTPATIENT
Start: 2023-09-26 | End: 2024-01-29

## 2023-09-26 RX ORDER — DULOXETIN HYDROCHLORIDE 60 MG/1
CAPSULE, DELAYED RELEASE ORAL
Qty: 30 CAPSULE | Refills: 2 | Status: SHIPPED | OUTPATIENT
Start: 2023-09-26 | End: 2024-01-29

## 2023-09-26 RX ORDER — DULOXETIN HYDROCHLORIDE 30 MG/1
CAPSULE, DELAYED RELEASE ORAL
Qty: 30 CAPSULE | Refills: 2 | Status: SHIPPED | OUTPATIENT
Start: 2023-09-26 | End: 2024-01-29

## 2023-09-26 NOTE — TELEPHONE ENCOUNTER
----- Message from Isael Franco sent at 9/26/2023  8:55 AM CDT -----  Contact: Jaimee  Type:  Appointment Request    Caller is requesting an appointment.   Name of Caller:Jaimee  Symptoms:med refills, appt, been trying for two months  Would the patient rather a call back or a response via [facility]? Call back  Best Call Back Number:[phone]  Additional Information: pt is needing an appt scheduled as soon as possible, states she has been trying for two months to get an appt ... She is needing a refill because she is running out ;;; states she will take the soonest appt available even if she isn't able to speak with a  as long as it in the morning ;; will not take a virtual appt            Thanks  MARIA L

## 2023-10-12 ENCOUNTER — PATIENT OUTREACH (OUTPATIENT)
Dept: ADMINISTRATIVE | Facility: HOSPITAL | Age: 73
End: 2023-10-12
Payer: MEDICARE

## 2023-10-18 ENCOUNTER — OFFICE VISIT (OUTPATIENT)
Dept: OPHTHALMOLOGY | Facility: CLINIC | Age: 73
End: 2023-10-18
Payer: MEDICARE

## 2023-10-18 DIAGNOSIS — H52.03 HYPEROPIA, BILATERAL: ICD-10-CM

## 2023-10-18 DIAGNOSIS — Z98.890 HISTORY OF REFRACTIVE SURGERY: ICD-10-CM

## 2023-10-18 DIAGNOSIS — H25.13 CATARACT, NUCLEAR SCLEROTIC SENILE, BILATERAL: Primary | ICD-10-CM

## 2023-10-18 PROCEDURE — 92015 PR REFRACTION: ICD-10-PCS | Mod: HCNC,S$GLB,, | Performed by: OPTOMETRIST

## 2023-10-18 PROCEDURE — 1159F MED LIST DOCD IN RCRD: CPT | Mod: HCNC,CPTII,S$GLB, | Performed by: OPTOMETRIST

## 2023-10-18 PROCEDURE — 92014 PR EYE EXAM, EST PATIENT,COMPREHESV: ICD-10-PCS | Mod: HCNC,S$GLB,, | Performed by: OPTOMETRIST

## 2023-10-18 PROCEDURE — 99999 PR PBB SHADOW E&M-EST. PATIENT-LVL II: ICD-10-PCS | Mod: PBBFAC,HCNC,, | Performed by: OPTOMETRIST

## 2023-10-18 PROCEDURE — 1159F PR MEDICATION LIST DOCUMENTED IN MEDICAL RECORD: ICD-10-PCS | Mod: HCNC,CPTII,S$GLB, | Performed by: OPTOMETRIST

## 2023-10-18 PROCEDURE — 99999 PR PBB SHADOW E&M-EST. PATIENT-LVL II: CPT | Mod: PBBFAC,HCNC,, | Performed by: OPTOMETRIST

## 2023-10-18 PROCEDURE — 92014 COMPRE OPH EXAM EST PT 1/>: CPT | Mod: HCNC,S$GLB,, | Performed by: OPTOMETRIST

## 2023-10-18 PROCEDURE — 92015 DETERMINE REFRACTIVE STATE: CPT | Mod: HCNC,S$GLB,, | Performed by: OPTOMETRIST

## 2023-10-18 NOTE — Clinical Note
Significant cataracts OU, discussed cataract surgery including Specialty [facility]'s Requests Dr Delgado, soft contact lens wearer, [place] Will need transportation!! Please see if pt is eligible for LYFT transportation before calling her.

## 2023-10-25 ENCOUNTER — TELEPHONE (OUTPATIENT)
Dept: OPHTHALMOLOGY | Facility: CLINIC | Age: 73
End: 2023-10-25
Payer: MEDICARE

## 2023-10-25 NOTE — TELEPHONE ENCOUNTER
Called to schedule cat DAVID barnett. Per Nani pt is not eligible for transportation through [employer]. Pt will need to contact her insurance company to see if that is offered for her.

## 2023-11-08 ENCOUNTER — LAB VISIT (OUTPATIENT)
Dept: LAB | Facility: HOSPITAL | Age: 73
End: 2023-11-08
Attending: FAMILY MEDICINE
Payer: MEDICARE

## 2023-11-08 ENCOUNTER — OFFICE VISIT (OUTPATIENT)
Dept: FAMILY MEDICINE | Facility: CLINIC | Age: 73
End: 2023-11-08
Payer: MEDICARE

## 2023-11-08 VITALS
WEIGHT: 129.94 LBS | HEIGHT: 64 IN | OXYGEN SATURATION: 95 % | TEMPERATURE: 98 F | SYSTOLIC BLOOD PRESSURE: 126 MMHG | BODY MASS INDEX: 22.18 KG/M2 | HEART RATE: 86 BPM | DIASTOLIC BLOOD PRESSURE: 74 MMHG

## 2023-11-08 DIAGNOSIS — E78.2 MIXED HYPERLIPIDEMIA: ICD-10-CM

## 2023-11-08 DIAGNOSIS — R73.03 PREDIABETES: ICD-10-CM

## 2023-11-08 DIAGNOSIS — T46.6X5A STATIN MYOPATHY: ICD-10-CM

## 2023-11-08 DIAGNOSIS — N63.0 BREAST MASS IN FEMALE: ICD-10-CM

## 2023-11-08 DIAGNOSIS — Z12.11 SCREENING FOR COLON CANCER: ICD-10-CM

## 2023-11-08 DIAGNOSIS — Z12.31 ENCOUNTER FOR SCREENING MAMMOGRAM FOR MALIGNANT NEOPLASM OF BREAST: ICD-10-CM

## 2023-11-08 DIAGNOSIS — F33.41 RECURRENT MAJOR DEPRESSIVE DISORDER, IN PARTIAL REMISSION: ICD-10-CM

## 2023-11-08 DIAGNOSIS — G72.0 STATIN MYOPATHY: ICD-10-CM

## 2023-11-08 DIAGNOSIS — R73.03 PREDIABETES: Primary | ICD-10-CM

## 2023-11-08 LAB
ALBUMIN SERPL BCP-MCNC: 4.1 G/DL (ref 3.5–5.2)
ALP SERPL-CCNC: 84 U/L (ref 55–135)
ALT SERPL W/O P-5'-P-CCNC: 12 U/L (ref 10–44)
ANION GAP SERPL CALC-SCNC: 10 MMOL/L (ref 8–16)
AST SERPL-CCNC: 16 U/L (ref 10–40)
BASOPHILS # BLD AUTO: 0.06 K/UL (ref 0–0.2)
BASOPHILS NFR BLD: 0.8 % (ref 0–1.9)
BILIRUB SERPL-MCNC: 0.5 MG/DL (ref 0.1–1)
BUN SERPL-MCNC: 12 MG/DL (ref 8–23)
CALCIUM SERPL-MCNC: 10 MG/DL (ref 8.7–10.5)
CHLORIDE SERPL-SCNC: 108 MMOL/L (ref 95–110)
CO2 SERPL-SCNC: 26 MMOL/L (ref 23–29)
CREAT SERPL-MCNC: 0.8 MG/DL (ref 0.5–1.4)
DIFFERENTIAL METHOD: NORMAL
EOSINOPHIL # BLD AUTO: 0.2 K/UL (ref 0–0.5)
EOSINOPHIL NFR BLD: 2.8 % (ref 0–8)
ERYTHROCYTE [DISTWIDTH] IN BLOOD BY AUTOMATED COUNT: 12.3 % (ref 11.5–14.5)
EST. GFR  (NO RACE VARIABLE): >60 ML/MIN/1.73 M^2
ESTIMATED AVG GLUCOSE: 123 MG/DL (ref 68–131)
GLUCOSE SERPL-MCNC: 112 MG/DL (ref 70–110)
HBA1C MFR BLD: 5.9 % (ref 4–5.6)
HCT VFR BLD AUTO: 38.6 % (ref 37–48.5)
HGB BLD-MCNC: 12.8 G/DL (ref 12–16)
IMM GRANULOCYTES # BLD AUTO: 0.03 K/UL (ref 0–0.04)
IMM GRANULOCYTES NFR BLD AUTO: 0.4 % (ref 0–0.5)
LYMPHOCYTES # BLD AUTO: 3.1 K/UL (ref 1–4.8)
LYMPHOCYTES NFR BLD: 38.9 % (ref 18–48)
MCH RBC QN AUTO: 30.8 PG (ref 27–31)
MCHC RBC AUTO-ENTMCNC: 33.2 G/DL (ref 32–36)
MCV RBC AUTO: 93 FL (ref 82–98)
MONOCYTES # BLD AUTO: 0.7 K/UL (ref 0.3–1)
MONOCYTES NFR BLD: 8.9 % (ref 4–15)
NEUTROPHILS # BLD AUTO: 3.8 K/UL (ref 1.8–7.7)
NEUTROPHILS NFR BLD: 48.2 % (ref 38–73)
NRBC BLD-RTO: 0 /100 WBC
PLATELET # BLD AUTO: 243 K/UL (ref 150–450)
PMV BLD AUTO: 9.6 FL (ref 9.2–12.9)
POTASSIUM SERPL-SCNC: 4.2 MMOL/L (ref 3.5–5.1)
PROT SERPL-MCNC: 7.2 G/DL (ref 6–8.4)
RBC # BLD AUTO: 4.16 M/UL (ref 4–5.4)
SODIUM SERPL-SCNC: 144 MMOL/L (ref 136–145)
WBC # BLD AUTO: 7.89 K/UL (ref 3.9–12.7)

## 2023-11-08 PROCEDURE — 99999 PR PBB SHADOW E&M-EST. PATIENT-LVL IV: CPT | Mod: PBBFAC,HCNC,, | Performed by: FAMILY MEDICINE

## 2023-11-08 PROCEDURE — 99999 PR PBB SHADOW E&M-EST. PATIENT-LVL IV: ICD-10-PCS | Mod: PBBFAC,HCNC,, | Performed by: FAMILY MEDICINE

## 2023-11-08 PROCEDURE — 3074F SYST BP LT 130 MM HG: CPT | Mod: HCNC,CPTII,S$GLB, | Performed by: FAMILY MEDICINE

## 2023-11-08 PROCEDURE — 3008F PR BODY MASS INDEX (BMI) DOCUMENTED: ICD-10-PCS | Mod: HCNC,CPTII,S$GLB, | Performed by: FAMILY MEDICINE

## 2023-11-08 PROCEDURE — 3078F DIAST BP <80 MM HG: CPT | Mod: HCNC,CPTII,S$GLB, | Performed by: FAMILY MEDICINE

## 2023-11-08 PROCEDURE — 3288F PR FALLS RISK ASSESSMENT DOCUMENTED: ICD-10-PCS | Mod: HCNC,CPTII,S$GLB, | Performed by: FAMILY MEDICINE

## 2023-11-08 PROCEDURE — 1159F PR MEDICATION LIST DOCUMENTED IN MEDICAL RECORD: ICD-10-PCS | Mod: HCNC,CPTII,S$GLB, | Performed by: FAMILY MEDICINE

## 2023-11-08 PROCEDURE — 1159F MED LIST DOCD IN RCRD: CPT | Mod: HCNC,CPTII,S$GLB, | Performed by: FAMILY MEDICINE

## 2023-11-08 PROCEDURE — 36415 COLL VENOUS BLD VENIPUNCTURE: CPT | Mod: HCNC,PO | Performed by: FAMILY MEDICINE

## 2023-11-08 PROCEDURE — 3078F PR MOST RECENT DIASTOLIC BLOOD PRESSURE < 80 MM HG: ICD-10-PCS | Mod: HCNC,CPTII,S$GLB, | Performed by: FAMILY MEDICINE

## 2023-11-08 PROCEDURE — 83036 HEMOGLOBIN GLYCOSYLATED A1C: CPT | Mod: HCNC | Performed by: FAMILY MEDICINE

## 2023-11-08 PROCEDURE — 1126F PR PAIN SEVERITY QUANTIFIED, NO PAIN PRESENT: ICD-10-PCS | Mod: HCNC,CPTII,S$GLB, | Performed by: FAMILY MEDICINE

## 2023-11-08 PROCEDURE — 99214 OFFICE O/P EST MOD 30 MIN: CPT | Mod: HCNC,S$GLB,, | Performed by: FAMILY MEDICINE

## 2023-11-08 PROCEDURE — 1126F AMNT PAIN NOTED NONE PRSNT: CPT | Mod: HCNC,CPTII,S$GLB, | Performed by: FAMILY MEDICINE

## 2023-11-08 PROCEDURE — 1101F PT FALLS ASSESS-DOCD LE1/YR: CPT | Mod: HCNC,CPTII,S$GLB, | Performed by: FAMILY MEDICINE

## 2023-11-08 PROCEDURE — 3008F BODY MASS INDEX DOCD: CPT | Mod: HCNC,CPTII,S$GLB, | Performed by: FAMILY MEDICINE

## 2023-11-08 PROCEDURE — 80053 COMPREHEN METABOLIC PANEL: CPT | Mod: HCNC | Performed by: FAMILY MEDICINE

## 2023-11-08 PROCEDURE — 1101F PR PT FALLS ASSESS DOC 0-1 FALLS W/OUT INJ PAST YR: ICD-10-PCS | Mod: HCNC,CPTII,S$GLB, | Performed by: FAMILY MEDICINE

## 2023-11-08 PROCEDURE — 3074F PR MOST RECENT SYSTOLIC BLOOD PRESSURE < 130 MM HG: ICD-10-PCS | Mod: HCNC,CPTII,S$GLB, | Performed by: FAMILY MEDICINE

## 2023-11-08 PROCEDURE — 99214 PR OFFICE/OUTPT VISIT, EST, LEVL IV, 30-39 MIN: ICD-10-PCS | Mod: HCNC,S$GLB,, | Performed by: FAMILY MEDICINE

## 2023-11-08 PROCEDURE — 3288F FALL RISK ASSESSMENT DOCD: CPT | Mod: HCNC,CPTII,S$GLB, | Performed by: FAMILY MEDICINE

## 2023-11-08 PROCEDURE — 85025 COMPLETE CBC W/AUTO DIFF WBC: CPT | Mod: HCNC | Performed by: FAMILY MEDICINE

## 2023-11-08 NOTE — PROGRESS NOTES
"Subjective:       Patient ID: Jaimee Mccarthy is a 73 y.o. female.    Chief Complaint: Annual Exam      HPI Comments:       Current Outpatient Medications:     ciclopirox (PENLAC) 8 % Soln, , Disp: , Rfl:     DULoxetine (CYMBALTA) 30 MG capsule, TAKE 1 CAPSULE BY MOUTH DAILY FOR DEPRESSION., Disp: 30 capsule, Rfl: 2    DULoxetine (CYMBALTA) 60 MG capsule, TAKE 1 CAPSULE BY MOUTH DAILY FOR DEPRESSION., Disp: 30 capsule, Rfl: 2    metoprolol succinate (TOPROL-XL) 25 MG 24 hr tablet, Take 0.5 tablets (12.5 mg total) by mouth once daily., Disp: 30 tablet, Rfl: 5    traZODone (DESYREL) 100 MG tablet, Take 1 to 2 tablets at bedtime as needed for sleep., Disp: 60 tablet, Rfl: 2    alendronate (FOSAMAX) 70 MG tablet, Take 1 tablet (70 mg total) by mouth every 7 days. (Patient not taking: Reported on 10/18/2023), Disp: 4 tablet, Rfl: 11    ezetimibe (ZETIA) 10 mg tablet, Take 1 tablet (10 mg total) by mouth once daily. (Patient not taking: Reported on 11/8/2023), Disp: 90 tablet, Rfl: 3      Four-month follow-up.  Due for mammogram.    Again tells me that she had a misdiagnosis for left breast cancer in the past leading to mastectomy.  She is having some surgical complications and would like to see a breast specialist here    Brought in her Cologuard result which was negative.  Will scan this to the chart.    Has not been taking Zetia because she was put on a beta-blocker by Cardiology in was not sure whether they could "mixed".  Says she " promises" to start taking it now she understands that is okay    Had some heart tests ordered by Cardiology that were not done.  She will get these scheduled    Pain have cataracts soon.  Dr. Delgado.  Will let me know.    Complains of burning in her eyes and skin when she is in her apartment.  She is been there about 6 months.  I suggested that she start with the health department reporting her symptoms      Review of Systems   Constitutional:  Negative for activity change, appetite change " "and fever.   HENT:  Negative for sore throat.    Eyes:  Positive for pain.   Respiratory:  Negative for cough and shortness of breath.    Cardiovascular:  Negative for chest pain.   Gastrointestinal:  Negative for abdominal pain, diarrhea and nausea.   Genitourinary:  Negative for difficulty urinating.   Musculoskeletal:  Negative for arthralgias and myalgias.   Neurological:  Negative for dizziness and headaches.       Objective:      Vitals:    11/08/23 0911   BP: 126/74   Pulse: 86   Temp: 98.3 °F (36.8 °C)   TempSrc: Tympanic   SpO2: 95%   Weight: 59 kg (129 lb 15.4 oz)   Height: 5' 4" (1.626 m)   PainSc: 0-No pain     Physical Exam  Constitutional:       Appearance: She is not ill-appearing.   HENT:      Head: Normocephalic.   Pulmonary:      Effort: Pulmonary effort is normal. No respiratory distress.   Musculoskeletal:      Cervical back: Neck supple.   Neurological:      Mental Status: She is alert and oriented to person, place, and time.   Psychiatric:         Mood and Affect: Mood normal.         Behavior: Behavior normal.         Thought Content: Thought content normal.         Judgment: Judgment normal.         Assessment:       1. Prediabetes    2. Mixed hyperlipidemia    3. Statin myopathy    4. Screening for colon cancer    5. Recurrent major depressive disorder, in partial remission    6. Encounter for screening mammogram for malignant neoplasm of breast    7. Breast mass in female        Plan:   Prediabetes  Comments:  A1c today  Orders:  -     CBC Auto Differential; Future; Expected date: 11/08/2023  -     Comprehensive Metabolic Panel; Future; Expected date: 11/08/2023  -     Hemoglobin A1C; Future; Expected date: 11/08/2023    Mixed hyperlipidemia  Comments:  Still on no medications.  Will start Zetia now.    Statin myopathy  Comments:  Zetia    Screening for colon cancer  Comments:  Cologuard negative.  Scanned to chart    Recurrent major depressive disorder, in partial remission  Comments:  On " Cymbalta    Encounter for screening mammogram for malignant neoplasm of breast  Comments:  Mammogram ordered  Orders:  -     Mammo Digital Screening Bilat w/ George; Future; Expected date: 11/08/2023    Breast mass in female  Comments:  Breast surgery consult  Orders:  -     Ambulatory referral/consult to Breast Surgery; Future; Expected date: 11/15/2023

## 2023-11-09 ENCOUNTER — TELEPHONE (OUTPATIENT)
Dept: FAMILY MEDICINE | Facility: CLINIC | Age: 73
End: 2023-11-09
Payer: MEDICARE

## 2023-11-09 NOTE — TELEPHONE ENCOUNTER
Spoke to pt and gave lab results and pt said she had a message for MD. Pt said MD is suppose to be helping her find a place where she can have testing done of the material in her house that's causing her to be sick; pt said MD will know what she is talking about; told pt I was going to send MD the message and she verbalized understanding.

## 2023-11-09 NOTE — TELEPHONE ENCOUNTER
----- Message from Alicia Wooten sent at 11/9/2023  8:03 AM CST -----  Contact: patient  [phone]  Patient is returning a phone call.  Who left a message for the patient:   Danny  Does patient know what this is regarding:  Test results  Would you like a call back, or a response through your [facility] portal?:  by phone    Comments:  patient stated the doctor was helping her with a problem, so please call her back

## 2023-11-09 NOTE — TELEPHONE ENCOUNTER
----- Message from La Nena Burch sent at 11/9/2023  1:45 PM CST -----  Regarding: patient Returning call  Contact: Jaimee  .Type:  Patient Returning Call    Who Called: Jaimee   Who Left Message for Patient:Danny Han LPN     Does the patient know what this is regarding?:  Would the patient rather a call back or a response via [facility]? call  Best Call Back Number:[phone]  Additional Information:  Jaimee is returning the missed call today      HISTORY AND PHYSICAL  [facility]     Cc: right cataract surgery with lens implant    HPI: 66 year old presents for pre-operative history and physical at the request of Dr. Batres who plans above procedure on 2020.     MH:  1. Osteoporosis    2. Hyperthyroidism s/p treatment with methimazole  3. Hyperparathyroidism  4. Diverticular change and colon polyp in  Colonoscopy- GI advised recheck in 5 yrs  5. Bilateral cataracts    ALL: Augmentin     FH:  Father,  at 91 of COPD, CHF and C diff, melanoma in his 70's, had treated prostate cancer.   Mother  at 77 of osteoporosis.  Apparently took a lot of steroids for asthma, also had pulmonary fibrosis    Older brother, healthy     SH:  .    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   Twin sons, both with severe autism.    2 to 3 cups of decaf coffee daily.    5 pack year smoking history, quit in her mid 30s.    Very rare alcohol.      Walks for exercise, also gardening     Current Outpatient Medications   Medication Sig   • metoPROLOL succinate (TOPROL-XL) 25 MG 24 hr tablet Take 1 tablet by mouth daily.   • DISPENSE #8939  (Severe) Terbinafine 1.7u%, Itraco 1%, Ketoco 1%, Clot 1%, Undecylenic in   Tea tree 10%, DMSO Nail solution  Apply once daily to affected nails for 48 weeks   • cholecalciferol (VITAMIN D3) 1000 UNITS tablet Take 1,000 Units by mouth daily.        ROS:  H: no headache  E:   E: no ear pain  N: no nasal congestion, no purulent nasal discharge  T: no sore throat, no dental problems  R: no cough, no shortness of breath  CV: no chest discomfort, no palpitations  Gl: no abdominal pain, no diarrhea  : no dysuria  N: no syncope  Const: no fevers/chills, no history of problems with anesthesia  Heme: no abnormal bleeding or h/o blood clots  Derm: no open skin wounds      EXAM:  Blood pressure 106/72, pulse 74, weight 68.9 kg (152 lb). Body mass index is 20.9 kg/m².  Gen: not in physical distress  Head: NC/AT, no  temporal wasting  Eyes: sclera anicteric, noninjected, PERRL, EOMI, right cataract noted  ENT: nares patent, right canal normal, right TM clear, left canal normal, left TM clear, posterior oropharynx clear, moist mucus membranes  Neck: supple, carotid pulses 2+ bilaterally, no bruits  Resp: effort unlabored, clear to auscultation bilaterally  CV: regular rate and rhythm, normal S1 and S2, no murmur  Abd: nondistended, soft, nontender, normal active bowel sounds  Ext: no pedal edema bilaterally  Psych: A & O x 3, euthymic mood and affect, good insight and judgement  Neuro: normal gait, CN 2-12 intact, DTR's 2+ throughout     Labs from her work collected 2/10/2020 reivewed  123--75  CBC within normal limits   CMP within normal limits except Calcium 10.9      A/P    #  Right cataract surgery     #  Low cardiopulmonary risk for eye surgery with MAC, in an ambulatory setting     #  Osteoporosis: Continue Prolia per Endocrinology      #  Preventive:   Colonoscopy done 6/2019.   Mammogram done 8/2019; ordered    Follow up in 1 year, sooner if necessary     Electronically signed by: Lm Garcia MD PhD  7/2/2020

## 2023-11-09 NOTE — TELEPHONE ENCOUNTER
Attempted to contact pt to give lab results; no answer; LVM with contact number for pt to call clinic

## 2023-11-09 NOTE — TELEPHONE ENCOUNTER
Attempted to contact pt back to give message from MD but pt did not answer; LVM with contact number for pt to contact the clinic

## 2023-11-09 NOTE — TELEPHONE ENCOUNTER
Spoke to pt and she said the chemical smell is so bad in her home that its making her nauseated and causing her throat to burn; I told pt she needs to get in contact with the health dept and pt said she tried but they aren't answering; pt wanted me to let MD know; told pt I was going to send him the message and she verbalized understanding

## 2023-11-17 ENCOUNTER — TELEPHONE (OUTPATIENT)
Dept: FAMILY MEDICINE | Facility: CLINIC | Age: 73
End: 2023-11-17
Payer: MEDICARE

## 2023-11-17 ENCOUNTER — LAB VISIT (OUTPATIENT)
Dept: LAB | Facility: HOSPITAL | Age: 73
End: 2023-11-17
Attending: FAMILY MEDICINE
Payer: MEDICARE

## 2023-11-17 DIAGNOSIS — R30.0 DYSURIA: ICD-10-CM

## 2023-11-17 DIAGNOSIS — R30.0 DYSURIA: Primary | ICD-10-CM

## 2023-11-17 LAB
BILIRUB UR QL STRIP: NEGATIVE
CLARITY UR REFRACT.AUTO: ABNORMAL
COLOR UR AUTO: YELLOW
GLUCOSE UR QL STRIP: NEGATIVE
HGB UR QL STRIP: ABNORMAL
KETONES UR QL STRIP: NEGATIVE
LEUKOCYTE ESTERASE UR QL STRIP: NEGATIVE
NITRITE UR QL STRIP: NEGATIVE
PH UR STRIP: 6 [PH] (ref 5–8)
PROT UR QL STRIP: NEGATIVE
SP GR UR STRIP: 1.02 (ref 1–1.03)
URN SPEC COLLECT METH UR: ABNORMAL

## 2023-11-17 PROCEDURE — 81003 URINALYSIS AUTO W/O SCOPE: CPT | Mod: HCNC | Performed by: FAMILY MEDICINE

## 2023-11-17 PROCEDURE — 87077 CULTURE AEROBIC IDENTIFY: CPT | Mod: HCNC | Performed by: FAMILY MEDICINE

## 2023-11-17 PROCEDURE — 87186 SC STD MICRODIL/AGAR DIL: CPT | Mod: HCNC | Performed by: FAMILY MEDICINE

## 2023-11-17 PROCEDURE — 87088 URINE BACTERIA CULTURE: CPT | Mod: HCNC | Performed by: FAMILY MEDICINE

## 2023-11-17 PROCEDURE — 87086 URINE CULTURE/COLONY COUNT: CPT | Mod: HCNC | Performed by: FAMILY MEDICINE

## 2023-11-17 NOTE — TELEPHONE ENCOUNTER
----- Message from Mihir Gibbs MD sent at 11/17/2023  9:14 AM CST -----  Regarding: RE: walk in urine  Contact: Jaimee  Orders in  ----- Message -----  From: Ann Marie Cordova MA  Sent: 11/17/2023   9:01 AM CST  To: Mihir Gibbs MD  Subject: walk in urine                                    Pt wants to do a urine sample for possible UTI today. Wants to know if you can put the order in or does she need an appointment. States she is still having headaches.    ----- Message -----  From: Segun Corey  Sent: 11/17/2023   8:51 AM CST  To: Bernie Ash Staff    Jaimee would like a call back at [phone] in regards to having UTI symptoms and would like to know if she can have a order put in for an urinalysis and also something sent to her pharmacy as well.   [facility] - [place] [address] [address] [address]  [facility] [address]  Phone: [phone] Fax: [phone]    Thanks   Am

## 2023-11-19 LAB — BACTERIA UR CULT: ABNORMAL

## 2023-11-21 ENCOUNTER — TELEPHONE (OUTPATIENT)
Dept: FAMILY MEDICINE | Facility: CLINIC | Age: 73
End: 2023-11-21
Payer: MEDICARE

## 2023-11-21 RX ORDER — AMOXICILLIN 875 MG/1
875 TABLET, FILM COATED ORAL 2 TIMES DAILY
Qty: 20 TABLET | Refills: 0 | Status: SHIPPED | OUTPATIENT
Start: 2023-11-21 | End: 2023-12-01

## 2023-11-21 NOTE — TELEPHONE ENCOUNTER
Spoke with Ynes at [facility] she is needing a referral for right breast exam only not bilateral . Sent to provider waiting for approval

## 2023-11-21 NOTE — TELEPHONE ENCOUNTER
----- Message from Mihir Gibbs MD sent at 11/21/2023  7:25 AM CST -----  Final urine culture came back with a small infection.  I have sent in amoxicillin to take for 10 days

## 2023-11-21 NOTE — TELEPHONE ENCOUNTER
----- Message from La Nena Burch sent at 11/21/2023  9:15 AM CST -----  Regarding: Medical Advice  Contact: Jay  .Type:  Needs Medical Advice    Who Called: jay   Symptoms (please be specific):    How long has patient had these symptoms:    Pharmacy name and phone #:    Would the patient rather a call back or a response via [facility]? call  Best Call Back Number:  [phone]   Additional Information:  Jay from [facility] is requesting a callback from the nurse today in regards to the patient who is waiting at their office.

## 2023-11-24 DIAGNOSIS — Z12.31 ENCOUNTER FOR SCREENING MAMMOGRAM FOR MALIGNANT NEOPLASM OF BREAST: Primary | ICD-10-CM

## 2023-11-27 ENCOUNTER — TELEPHONE (OUTPATIENT)
Dept: FAMILY MEDICINE | Facility: CLINIC | Age: 73
End: 2023-11-27
Payer: MEDICARE

## 2023-11-27 NOTE — TELEPHONE ENCOUNTER
----- Message from Osiel Mccoy sent at 11/27/2023  2:41 PM CST -----  Contact: [phone]  Type:  Patient Returning Call    Who Called:[facility]   Who Left Message for Patient:nurse   Does the patient know what this is regarding?:pt   Would the patient rather a call back or a response via [facility]? Call back   Best Call Back Number:[phone] ext [phone]  Additional Information:

## 2023-11-27 NOTE — TELEPHONE ENCOUNTER
"Spoke to Andie at [facility] and she said she need a new order put in because MD put order in for bilateral breast screening and it should be unilateral because the pt only has one breast; told Andie I do see another order in epic for Rt breast screening is that okay and she replied, "Yes". Told Andie I was going to fax the new referral to her at [phone] and she verbalized understanding  "

## 2023-11-27 NOTE — TELEPHONE ENCOUNTER
----- Message from Estela Cordova sent at 11/27/2023  9:13 AM CST -----  Andie with [facility] called in this morning stating she needs a unilateral of the right breast, the wrong information was sent in. Please call back [phone] please fa x [phone]

## 2023-11-29 ENCOUNTER — TELEPHONE (OUTPATIENT)
Dept: FAMILY MEDICINE | Facility: CLINIC | Age: 73
End: 2023-11-29
Payer: MEDICARE

## 2023-11-29 NOTE — TELEPHONE ENCOUNTER
Spoke to Andie at [facility] and she said she did not get the order I faxed over so I got her email address and sent it to her email.

## 2023-11-29 NOTE — TELEPHONE ENCOUNTER
"----- Message from Holly Farnklin sent at 11/29/2023  9:10 AM CST -----  Contact: [facility]  Andie is calling to get orders for "Mammo Digital Screening Right with George" faxed to [phone]. She is also asking can it be printed then faxed due to it not going through electronically. Please call back at [phone]              Thanks  SW    "

## 2023-12-08 ENCOUNTER — OFFICE VISIT (OUTPATIENT)
Dept: PSYCHIATRY | Facility: CLINIC | Age: 73
End: 2023-12-08
Payer: MEDICARE

## 2023-12-08 VITALS
SYSTOLIC BLOOD PRESSURE: 148 MMHG | HEART RATE: 84 BPM | DIASTOLIC BLOOD PRESSURE: 84 MMHG | BODY MASS INDEX: 22.18 KG/M2 | WEIGHT: 129.19 LBS

## 2023-12-08 DIAGNOSIS — G47.00 INSOMNIA, UNSPECIFIED TYPE: ICD-10-CM

## 2023-12-08 DIAGNOSIS — F41.9 ANXIETY: Primary | ICD-10-CM

## 2023-12-08 DIAGNOSIS — Z86.59 HISTORY OF PSYCHOSIS: ICD-10-CM

## 2023-12-08 PROCEDURE — 99999 PR PBB SHADOW E&M-EST. PATIENT-LVL I: CPT | Mod: PBBFAC,HCNC,, | Performed by: PSYCHIATRY & NEUROLOGY

## 2023-12-08 PROCEDURE — 99999 PR PBB SHADOW E&M-EST. PATIENT-LVL I: ICD-10-PCS | Mod: PBBFAC,HCNC,, | Performed by: PSYCHIATRY & NEUROLOGY

## 2023-12-08 PROCEDURE — 3079F PR MOST RECENT DIASTOLIC BLOOD PRESSURE 80-89 MM HG: ICD-10-PCS | Mod: HCNC,CPTII,S$GLB, | Performed by: PSYCHIATRY & NEUROLOGY

## 2023-12-08 PROCEDURE — 3077F SYST BP >= 140 MM HG: CPT | Mod: HCNC,CPTII,S$GLB, | Performed by: PSYCHIATRY & NEUROLOGY

## 2023-12-08 PROCEDURE — 99214 OFFICE O/P EST MOD 30 MIN: CPT | Mod: HCNC,S$GLB,, | Performed by: PSYCHIATRY & NEUROLOGY

## 2023-12-08 PROCEDURE — 3077F PR MOST RECENT SYSTOLIC BLOOD PRESSURE >= 140 MM HG: ICD-10-PCS | Mod: HCNC,CPTII,S$GLB, | Performed by: PSYCHIATRY & NEUROLOGY

## 2023-12-08 PROCEDURE — 3079F DIAST BP 80-89 MM HG: CPT | Mod: HCNC,CPTII,S$GLB, | Performed by: PSYCHIATRY & NEUROLOGY

## 2023-12-08 PROCEDURE — 3044F PR MOST RECENT HEMOGLOBIN A1C LEVEL <7.0%: ICD-10-PCS | Mod: HCNC,CPTII,S$GLB, | Performed by: PSYCHIATRY & NEUROLOGY

## 2023-12-08 PROCEDURE — 3008F BODY MASS INDEX DOCD: CPT | Mod: HCNC,CPTII,S$GLB, | Performed by: PSYCHIATRY & NEUROLOGY

## 2023-12-08 PROCEDURE — 3044F HG A1C LEVEL LT 7.0%: CPT | Mod: HCNC,CPTII,S$GLB, | Performed by: PSYCHIATRY & NEUROLOGY

## 2023-12-08 PROCEDURE — 99214 PR OFFICE/OUTPT VISIT, EST, LEVL IV, 30-39 MIN: ICD-10-PCS | Mod: HCNC,S$GLB,, | Performed by: PSYCHIATRY & NEUROLOGY

## 2023-12-08 PROCEDURE — 3008F PR BODY MASS INDEX (BMI) DOCUMENTED: ICD-10-PCS | Mod: HCNC,CPTII,S$GLB, | Performed by: PSYCHIATRY & NEUROLOGY

## 2023-12-08 NOTE — PROGRESS NOTES
"Outpatient Psychiatry Follow-up Visit (MD/NP)    12/8/2023    Jaimee Mccarthy, a 73 y.o. female, presenting for follow-up visit. Met with patient.    Reason for Encounter: follow-up, mood disorder, history of paranoia    Interval Hx: Pt seen and interviewed for follow-up, last seen about four months ago. Anxiety related to stress from a conflict with a neighbor and apartment complex. Wants to move but can't afford to break her lease at present. Has experienced mild Weight gain. No new medications. No longer doing part-time work. Mostly adherent to medication. Denies side effects.     Background: This 68 y/o F presents to [facility], previously a pt of Dr. Degroot(sp?) who she last saw about 3-4 months ago, but who she says disclosed protected health information to a family member so she is seeking new psychiatrist. Reports current medication regimen includes Cymbalta 90 mg (60 mg +30 mg) every morning, & doxepin 100 mg daily at bedtime. Says this regimen is working well. On 8.9.17 - had ER visit by [facility] after EMS called. EMS noted paranoia - cited that she'd ripped shingles off roof, told police she thought someone had been in house several days before after calling them due to things moved in her home, heard something on her roof & when she checked found vents weren't sealed, "strange wiring", called police. ED physician exam documented that she didn't seem to be hallucinating, delusional or thought- disordered & she was discharged. No SI/HI. No AVH. PsychHx: as above; "Depression with anxiety" - office-based outpt treatment, inpatient treatment - x2-3 (for changing sleep medication in context of highly distressing insomnia); Series of previous antidepressants - reports having when started Cymbalta 90 mg daily, helped moods far more than prior treatments ("when I started it I thought I was reborn"). Doxepin 100 mg qhs. Partially helpful for Insomnia; Two prior psychiatrist, previously Dr. Degroot(sp?) Treating " "her with same medications. [facility], threatened to turn him in for medicaid fraud. 8.9.17 - ER visit by [facility] after EMS called. EMS noted paranoia - cited that she'd ripped shingles off roof, told police she thought someone had been in house several days before after calling them due to things moved in her home, heard something on her roof & when she checked found vents weren't sealed, "strange wiring", called police. Not found to be psychotic on ED assessment. No other PEC's though had visit from a state agency in '16 due to concern by an unknown neighbor for "delusional", "acting out at her apartment" (report didn't describe her specific acting out behavior).  had  come out for a wellness check - "someone in the complex said I wasn't taking care of myself", said my "apartment was a wreck".  found her apartment in order, didn't take her in for care. Denies conflicts with others or reasons someone might make a bad shara report. Has been out of that complex x 5-6 months. One remote episode of passive suicidality in '13 when went in for lumpectomy - ended up with mastectomy, + lymph node dissection then learned pathology showed no cancer. FamHx: sister with serious mental illness, son dyslexia. MedHx: Rib Removed. Breast CA dx for which she had mastectomy - reports pathology later indicated she didn't have CA. Cervical CA (s/p cone) - History of cervical cancer many years ago: Basal and squamous cell CA (nares). Hyperlipidemia. Recurrent urinary tract infections. SocialHx: born in [facility], raised in . 3 sibs growing up; much older (18 and 20 years). lives in subsidized disability housing; sister institutionalized. Lived with patient's parents as an adult. On/off meds. Cycle of hospitalizations. Functioned ok on meds, not off. Doesn't know diagnoses. Normal socially & academically. 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 Moved to [place] to " "be with son.  x 1 child. Was single mother. Lives in [place].  x 2 (,  36 years). Supportive people "all " - both parents and siblings . Trying to get moved in to house. Trying to volunteer with the food bank. Wants to join [employer]. Social security - recognized mental health disability. Takes medication every day.     Review Of Systems:     GENERAL:  No weight gain or loss  SKIN:  No rashes or lacerations  HEAD:  No headaches  CHEST:  No shortness of breath, hyperventilation or cough  CARDIOVASCULAR:  No tachycardia or chest pain  ABDOMEN:  No nausea, vomiting, pain, constipation or diarrhea  URINARY:  No frequency, dysuria or sexual dysfunction  ENDOCRINE:  No polydipsia, polyuria  MUSCULOSKELETAL:  No pain or stiffness of the joints  NEUROLOGIC:  No weakness, sensory changes, seizures, confusion, memory loss, tremor or other abnormal movements    Current Evaluation:     Nutritional Screening: Considering the patient's height and weight, medications, medical history and preferences, should a referral be made to the dietitian? no    Constitutional  Vitals:  Most recent vital signs, dated less than 90 days prior to this appointment, were not reviewed.    Vitals:    23 1356   BP: (!) 148/84   Pulse: 84   Weight: 58.6 kg (129 lb 3 oz)        General:  unremarkable, age appropriate     Musculoskeletal  Muscle Strength/Tone:  no tremor, no tic   Gait & Station:  non-ataxic     Psychiatric  Appearance: casually dressed & groomed;   Behavior: calm,   Cooperation: cooperative with assessment  Speech: normal rate, volume, tone  Thought Process: linear, goal-directed  Thought Content: No suicidal or homicidal ideation; no delusions  Affect: mildly anxious  Mood: mildly anxious  Perceptions: No auditory or visual hallucinations  Level of Consciousness: alert throughout interview  Insight: fair  Cognition: Oriented to person, place, time, & situation  Memory: no apparent deficits to " general clinical interview; not formally assessed  Attention/Concentration: no apparent deficits to general clinical interview; not formally assessed  Fund of Knowledge: average by vocabulary/education    Laboratory Data  Lab Visit on 2023   Component Date Value Ref Range Status    Specimen UA 2023 Urine, Clean Catch   Final    Color, UA 2023 Yellow  Yellow, Straw, Cesilia Final    Appearance, UA 2023 Hazy (A)  Clear Final    pH, UA 2023 6.0  5.0 - 8.0 Final    Specific Gravity, UA 2023 1.020  1.005 - 1.030 Final    Protein, UA 2023 Negative  Negative Final    Glucose, UA 2023 Negative  Negative Final    Ketones, UA 2023 Negative  Negative Final    Bilirubin (UA) 2023 Negative  Negative Final    Occult Blood UA 2023 Trace (A)  Negative Final    Nitrite, UA 2023 Negative  Negative Final    Leukocytes, UA 2023 Negative  Negative Final    Urine Culture, Routine 2023  (A)   Final                    Value:PROTEUS MIRABILIS  50,000 - 99,999 cfu/ml       Medications  Outpatient Encounter Medications as of 2023   Medication Sig Dispense Refill    alendronate (FOSAMAX) 70 MG tablet Take 1 tablet (70 mg total) by mouth every 7 days. (Patient not taking: Reported on 10/18/2023) 4 tablet 11    [] amoxicillin (AMOXIL) 875 MG tablet Take 1 tablet (875 mg total) by mouth 2 (two) times daily. for 10 days 20 tablet 0    ciclopirox (PENLAC) 8 % Soln       DULoxetine (CYMBALTA) 30 MG capsule TAKE 1 CAPSULE BY MOUTH DAILY FOR DEPRESSION. 30 capsule 2    DULoxetine (CYMBALTA) 60 MG capsule TAKE 1 CAPSULE BY MOUTH DAILY FOR DEPRESSION. 30 capsule 2    ezetimibe (ZETIA) 10 mg tablet Take 1 tablet (10 mg total) by mouth once daily. (Patient not taking: Reported on 2023) 90 tablet 3    metoprolol succinate (TOPROL-XL) 25 MG 24 hr tablet Take 0.5 tablets (12.5 mg total) by mouth once daily. 30 tablet 5    traZODone (DESYREL) 100 MG tablet Take  1 to 2 tablets at bedtime as needed for sleep. 60 tablet 2     No facility-administered encounter medications on file as of 12/8/2023.     Assessment - Diagnosis - Goals:     Impression: 70 y/o F with hx of chronic, seeming intermittent paranoia, possibly hallucinations as well. Poor insight into nature of symptoms. Self-identifies her mental health problems as with anxiety, depression for years. Has had a [facility] for concerns for paranoia and near-[facility] for alleged neglect, hospitalization in 2018 for psychosis. Didn't tolerate & self-discontinued 2 antipsychotics, but tolerated olanazpine. Free-floating anxiety unrelated to paranoia intermittently continued to be a problem. Recently off antipsychotic due to side effects thus far without return of psychosis. Diagnosed with idiopathic neuropathy with mild symptoms. Stable with ongoing treatment.     Dx: hx of psychosis. insomnia, anxiety,    Treatment Goals:  Specify outcomes written in observable, behavioral terms:   Monitor for paranoia. Control depression and anxiety symptoms by self-report    Treatment Plan/Recommendations:      Suspect paranoia with resemblance to behaviors documented in visits 2017/18, though her suspicions are plausible. Offered start medication for her worries about neigbors (would prescribe seroquel or like medication). She is not willing to change medications. Patient denies having firearms or other weapons. No plans to confront neighbors. Continue duloxetine for pain and mood. Trazodone prn sleep. She's off antipsychotic. Observe for worsening paranoia, other symptoms.   Discussed risks, benefits, and alternatives to treatment plan documented above with patient. I answered all patient questions related to this plan and patient expressed understanding and agreement.     Return to Clinic: 3 months    VIPIN Chavez MD

## 2023-12-18 ENCOUNTER — TELEPHONE (OUTPATIENT)
Dept: FAMILY MEDICINE | Facility: CLINIC | Age: 73
End: 2023-12-18
Payer: MEDICARE

## 2023-12-18 NOTE — TELEPHONE ENCOUNTER
----- Message from Katy Nevarez sent at 12/18/2023  2:27 PM CST -----  .Type:  Patient Call Back    Who Called: PT       Does the patient know what this is regarding?: PT RECEIVED A LETTER FROM JUANITA STATING SHE NEEDS A F/U VISIT FOR THE UTI. PT WANTS TO SPEAK WITH OFFICE FIRST     Would the patient rather a call back YES     Best Call Back Number: [phone]    Additional Information: Thank You

## 2023-12-18 NOTE — TELEPHONE ENCOUNTER
Spoke with pt she states she is still having poss uti symptoms and would like to be seen by provider. Scheduled pt appt 12/21/2023 11 am

## 2023-12-22 ENCOUNTER — TELEPHONE (OUTPATIENT)
Dept: OPHTHALMOLOGY | Facility: CLINIC | Age: 73
End: 2023-12-22
Payer: MEDICARE

## 2024-01-29 RX ORDER — DULOXETIN HYDROCHLORIDE 30 MG/1
CAPSULE, DELAYED RELEASE ORAL
Qty: 30 CAPSULE | Refills: 2 | Status: SHIPPED | OUTPATIENT
Start: 2024-01-29

## 2024-01-29 RX ORDER — TRAZODONE HYDROCHLORIDE 100 MG/1
TABLET ORAL
Qty: 60 TABLET | Refills: 2 | Status: SHIPPED | OUTPATIENT
Start: 2024-01-29

## 2024-01-29 RX ORDER — DULOXETIN HYDROCHLORIDE 60 MG/1
CAPSULE, DELAYED RELEASE ORAL
Qty: 30 CAPSULE | Refills: 2 | Status: SHIPPED | OUTPATIENT
Start: 2024-01-29

## 2024-01-30 ENCOUNTER — TELEPHONE (OUTPATIENT)
Dept: SURGERY | Facility: CLINIC | Age: 74
End: 2024-01-30
Payer: MEDICARE

## 2024-02-06 DIAGNOSIS — Z12.11 COLON CANCER SCREENING: ICD-10-CM

## 2024-02-22 NOTE — TELEPHONE ENCOUNTER
----- Message from Mihir Gibbs MD sent at 11/9/2023 12:03 AM CST -----  Bloodwork looks good, including sugar and kidney tests   Inpatient Physical Exam

## 2024-05-22 DIAGNOSIS — T46.6X5A STATIN MYOPATHY: ICD-10-CM

## 2024-05-22 DIAGNOSIS — E78.2 MIXED HYPERLIPIDEMIA: Primary | ICD-10-CM

## 2024-05-22 DIAGNOSIS — G72.0 STATIN MYOPATHY: ICD-10-CM

## 2024-05-22 NOTE — TELEPHONE ENCOUNTER
Refill Routing Note   Medication(s) are not appropriate for processing by [facility] for the following reason(s):        Required labs outdated: Lipid panel    [facility] action(s):  Defer     Requires labs : Yes    Medication Therapy Plan:    Pended labs utilizing BestPracticeAdvisor (BPA) tab due to extra training from LPN      Appointments  past 12m or future 3m with PCP    Date Provider   Last Visit   11/8/2023 Mihir Gibbs MD   Next Visit   Visit date not found Mhiir Gibbs MD   ED visits in past 90 days: 0        Note composed:2:29 PM 05/22/2024

## 2024-05-22 NOTE — TELEPHONE ENCOUNTER
Care Due:                  Date            Visit Type   Department     Provider  --------------------------------------------------------------------------------                                EP -                              PRIMARY      [facility] INTERNAL  Last Visit: 11-      CARE ([facility])   MEDICINE       Mihir Gibbs  Next Visit: None Scheduled  None         None Found                                                            Last  Test          Frequency    Reason                     Performed    Due Date  --------------------------------------------------------------------------------    Lipid Panel.  12 months..  ezetimibe................  10-   10-    Health [facility] Embedded Care Due Messages. Reference number: [id number].   5/22/2024 8:34:56 AM CDT

## 2024-05-23 RX ORDER — METOPROLOL SUCCINATE 25 MG/1
TABLET, EXTENDED RELEASE ORAL
Qty: 30 TABLET | Refills: 5 | Status: SHIPPED | OUTPATIENT
Start: 2024-05-23

## 2024-05-24 RX ORDER — EZETIMIBE 10 MG/1
10 TABLET ORAL DAILY
Qty: 90 TABLET | Refills: 0 | Status: SHIPPED | OUTPATIENT
Start: 2024-05-24

## 2024-09-21 DIAGNOSIS — G72.0 STATIN MYOPATHY: ICD-10-CM

## 2024-09-21 DIAGNOSIS — T46.6X5A STATIN MYOPATHY: ICD-10-CM

## 2024-09-21 NOTE — TELEPHONE ENCOUNTER
Care Due:                  Date            Visit Type   Department     Provider  --------------------------------------------------------------------------------                                EP -                              PRIMARY      [facility] INTERNAL  Last Visit: 11-      CARE ([facility])   MEDICINE       Mihir Gibbs  Next Visit: None Scheduled  None         None Found                                                            Last  Test          Frequency    Reason                     Performed    Due Date  --------------------------------------------------------------------------------    CMP.........  12 months..  ezetimibe................  11- 11-    Lipid Panel.  12 months..  ezetimibe................  10-   10-    [facility] Embedded Care Due Messages. Reference number: [id number].   9/21/2024 12:18:01 PM CDT

## 2024-09-22 NOTE — TELEPHONE ENCOUNTER
Refill Routing Note   Medication(s) are not appropriate for processing by [facility] for the following reason(s):        Required labs outdated    [facility] action(s):  Defer     Requires labs : Yes             Appointments  past 12m or future 3m with PCP    Date Provider   Last Visit   Visit date not found Mihir Gibbs MD   Next Visit   Visit date not found Mihir Gibbs MD   ED visits in past 90 days: 0        Note composed:12:44 PM 09/22/2024

## 2024-09-23 RX ORDER — EZETIMIBE 10 MG/1
10 TABLET ORAL
Qty: 90 TABLET | Refills: 0 | Status: SHIPPED | OUTPATIENT
Start: 2024-09-23

## 2024-11-13 DIAGNOSIS — R73.03 PREDIABETES: ICD-10-CM
